# Patient Record
Sex: FEMALE | Race: WHITE | Employment: UNEMPLOYED | URBAN - METROPOLITAN AREA
[De-identification: names, ages, dates, MRNs, and addresses within clinical notes are randomized per-mention and may not be internally consistent; named-entity substitution may affect disease eponyms.]

---

## 2017-01-22 ENCOUNTER — TELEPHONE (OUTPATIENT)
Dept: FAMILY MEDICINE CLINIC | Age: 57
End: 2017-01-22

## 2017-01-23 RX ORDER — OXYCODONE AND ACETAMINOPHEN 5; 325 MG/1; MG/1
1 TABLET ORAL
Qty: 45 TAB | Refills: 0 | Status: SHIPPED | OUTPATIENT
Start: 2017-01-27 | End: 2017-02-18 | Stop reason: SDUPTHER

## 2017-02-20 RX ORDER — IBUPROFEN 800 MG/1
800 TABLET ORAL
Qty: 30 TAB | Refills: 2 | Status: SHIPPED | OUTPATIENT
Start: 2017-02-20 | End: 2017-06-29 | Stop reason: SDUPTHER

## 2017-02-20 RX ORDER — AMOXICILLIN AND CLAVULANATE POTASSIUM 875; 125 MG/1; MG/1
1 TABLET, FILM COATED ORAL EVERY 12 HOURS
Qty: 20 TAB | Refills: 0 | Status: SHIPPED | OUTPATIENT
Start: 2017-02-20 | End: 2017-03-31 | Stop reason: SDUPTHER

## 2017-02-20 RX ORDER — FLUCONAZOLE 150 MG/1
150 TABLET ORAL DAILY
Qty: 2 TAB | Refills: 0 | Status: SHIPPED | OUTPATIENT
Start: 2017-02-20 | End: 2017-04-03 | Stop reason: SDUPTHER

## 2017-02-20 NOTE — TELEPHONE ENCOUNTER
From: Jasiel Hernandez  To: Norma Ross MD  Sent: 2/18/2017 7:21 PM EST  Subject: Medication Renewal Request    Original authorizing provider: MD Jasiel Tai would like a refill of the following medications:  ibuprofen (MOTRIN) 800 mg tablet Federico Lyons MD]    Preferred pharmacy: [facility] [address] [place] [address],[address]:

## 2017-03-29 ENCOUNTER — OFFICE VISIT (OUTPATIENT)
Dept: FAMILY MEDICINE CLINIC | Age: 57
End: 2017-03-29

## 2017-03-29 VITALS
HEIGHT: 56 IN | HEART RATE: 97 BPM | DIASTOLIC BLOOD PRESSURE: 90 MMHG | WEIGHT: 146 LBS | BODY MASS INDEX: 32.84 KG/M2 | OXYGEN SATURATION: 98 % | RESPIRATION RATE: 16 BRPM | SYSTOLIC BLOOD PRESSURE: 146 MMHG | TEMPERATURE: 97.6 F

## 2017-03-29 DIAGNOSIS — E11.40 TYPE 2 DIABETES MELLITUS WITH DIABETIC NEUROPATHY, UNSPECIFIED LONG TERM INSULIN USE STATUS: Primary | ICD-10-CM

## 2017-03-29 DIAGNOSIS — G89.29 OTHER CHRONIC PAIN: ICD-10-CM

## 2017-03-29 DIAGNOSIS — S91.002S WOUND OF LEFT ANKLE, SEQUELA: ICD-10-CM

## 2017-03-29 LAB
BARBITURATES UR POC: NEGATIVE
BENZODIAZEPINES UR POC: NEGATIVE
COCAINE QL URINE POC: NEGATIVE
LOT EXP DATE POC: NORMAL
LOT NUMBER POC: NORMAL
MARIJUANA (THC) QL URINE POC: NEGATIVE
MDMA/ECSTASY UR POC: NEGATIVE
METHADONE QL URINE POC: NEGATIVE
METHAMPHETAMINE QL URINE POC: NEGATIVE
NTI OTHER MICRO TRANSPORT 977598: NEGATIVE
OPIATES QL URINE POC: NEGATIVE
OXYCODONE UR POC: NORMAL
VALID INTERNAL CONTROL?: YES

## 2017-03-29 RX ORDER — OXYCODONE AND ACETAMINOPHEN 5; 325 MG/1; MG/1
1 TABLET ORAL
Qty: 45 TAB | Refills: 0 | Status: SHIPPED | OUTPATIENT
Start: 2017-03-29 | End: 2017-04-26 | Stop reason: SDUPTHER

## 2017-03-29 NOTE — PROGRESS NOTES
Chief Complaint   Patient presents with    Follow-up     3 month follow-up    Foot Pain     Left foot pain     Subjective: (As above and below)     Chief Complaint   Patient presents with    Follow-up     3 month follow-up    Foot Pain     Left foot pain     she is a 60y.o. year old female who presents for evaluation. Reviewed PmHx, RxHx, FmHx, SocHx, AllgHx and updated in chart. Review of Systems - negative except as listed above    Objective:     Vitals:    03/29/17 1438   BP: 146/90   Pulse: 97   Resp: 16   Temp: 97.6 °F (36.4 °C)   TempSrc: Oral   SpO2: 98%   Weight: 146 lb (66.2 kg)   Height: 4' 8\" (1.422 m)     Physical Examination: General appearance - alert, well appearing, and in no distress  Mental status - normal mood, behavior, speech, dress, motor activity, and thought processes  Eyes - pupils equal and reactive, extraocular eye movements intact  Mouth - mucous membranes moist, pharynx normal without lesions  Chest - clear to auscultation, no wheezes, rales or rhonchi, symmetric air entry  Heart - normal rate, regular rhythm, normal S1, S2, no murmurs, rubs, clicks or gallops  Musculoskeletal - chronic left ankle wound, wrapped today  Extremities - no edema    Assessment/ Plan:   1. Type 2 diabetes mellitus with diabetic neuropathy, unspecified long term insulin use status ([facility] [address]  -continue on current medication    2. Other chronic pain  -well controlled on current medication    3. Wound of left ankle, sequela  -advised pt to keep specialist follow up     Follow-up Disposition: As needed  I have discussed the diagnosis with the patient and the intended plan as seen in the above orders. The patient has received an after-visit summary and questions were answered concerning future plans.      Medication Side Effects and Warnings were discussed with patient: yes  Patient Labs were reviewed: yes  Patient Past Records were reviewed:  yes    Nancy Rodney M.D.

## 2017-03-29 NOTE — PROGRESS NOTES
Chief Complaint   Patient presents with    Follow-up     3 month follow-up    Foot Pain     Left foot pain

## 2017-03-29 NOTE — LETTER
Ludin Grady GPU:7/23/2087 MR #:[id number] Provider Marlyn Rutherford MD  
*XYFD-395* BSMG-491 (5/16) Page 1 of 5 Initial [employer] CONTROLLED SUBSTANCE AGREEMENT I may be prescribed medications that are controlled substances as part  of my treatment plan for management of my medical condition(s). The goal of my treatment plan is to maintain and/or improve my health and wellbeing. Because controlled substances have an increased risk of abuse or harm, continual re-evaluation is needed determine if the goals of my treatment plan are being met for my safety and the safety of others. Heather Freeman  am entering into this Controlled Substance Agreement with my provider, Jagdish Zafar MD at [address] . I understand that successful treatment requires mutual trust and honesty between me and my provider. I understand that there are state and federal laws and regulations which apply to the medications that my provider may prescribe that must be followed. I understand there are risks and benefits ts of taking the medicines that my provider may prescribe. I understand and agree that following this Agreement is necessary in continuing my provider-patient relationship and success of my treatment plan. As a part of my treatment plan, I agree to the following: COMMUNICATION: 
 
1. I will communicate fully with my provider about my medical condition(s), including the effect on my daily life and how well my medications are helping. I will tell my provider all of the medications that I take for any reason, including medications I receive from another health care provider, and will notify my provider about all issues, problems or concerns, including any side effects, which may be related to my medications. I understand that this information allows my provider to adjust my treatment plan to help manage my medical condition.  I understand that this information will become part of my permanent medical record. 2. I will notify my provider if I have a history of alcohol/drug misuse/addiction or if I have had treatment for alcohol/drug addiction in the past, or if I have a new problem with or concern about alcohol/drug use/addiction, because this increases the likelihood of high risk behaviors and may lead to serious medical conditions. 3. Females Only: I will notify my provider if I am or become pregnant, or if I intend to become pregnant, or if I intend to breastfeed. I understand that communication of these issues with my provider is important, due to possible effects my medication could have on an unborn fetus or breastfeeding child. Dhaval Navarro IPF:[birth year] MR #:[id number] Provider Charlie Villagran MD  
*IJZJ-377* BSMG-491 (5/16) Page 2 of 5 Initial [employer] MISUSE OF MEDICATIONS / DRUGS: 
 
1. I agree to take all controlled substances as prescribed, and will not misuse or abuse any controlled substances prescribed by my provider. For my safety, I will not increase the amount of medicine I take without first talking with and getting permission from my provider. 2. If I have a medical emergency, another health care provider may prescribe me medication. If I seek emergency treatment, I will notify my provider within seventy-two (72) hours. 3. I understand that my provider may discuss my use and/or possible misuse/abuse of controlled substances and alcohol, as appropriate, with any health care provider involved in my care, pharmacist or legal authority. ILLEGAL DRUGS: 
 
1. I will not use illegal drugs of any kind, including but not limited to marijuana, heroin, cocaine, or any prescription drug which is not prescribed to me. DRUG DIVERSION / PRESCRIPTION FRAUD: 
 
1. I will not share, sell, trade, give away, or otherwise misuse my prescriptions or medications. 2. I will not alter any prescriptions provided to me by my provider. SINGLE PROVIDER: 
 
1. I agree that all controlled substances that I take will be prescribed only by my provider (or his/her covering provider) under this Agreement. This agreement does not prevent me from seeking emergency medical treatment or receiving pain management related to a surgery. PROTECTING MEDICATIONS: 
 
1. I am responsible for keeping my prescriptions and medications in a safe and secure place including safeguarding them from loss or theft. I understand that lost, stolen or damaged/destroyed prescriptions or medications will not be replaced. Dhaval HAGRE:0/39/9108 MR #:[id number] Provider Charlie Villagran MD  
*GQJE-525* BSMG-491 (5/16) Page 3 of 5 Initial "2,10E+07" PRESCRIPTION RENEWALS/REFILLS: 
 
1. I will follow my controlled substance medication schedule as prescribed by my provider. 2. I understand and agree that I will make any requests for renewals or refills of my prescriptions only at the time of an office visit or during my providers regular office hours subject to the prescription refill requirements of the individual practice. 3. I understand that my provider may not call in prescriptions for controlled substances to my pharmacy. 4. I understand that my provider may adjust or discontinue these medications as deemed appropriate for my medical treatment plan. This Agreement does not guarantee the prescription of controlled medications. 5. I agree that if my medications are adjusted or discontinued, I will properly dispose of any remaining medications. I understand that I will be required to dispose of any remaining controlled medications prior to being provided with any prescriptions for other controlled medications.  
 
 
1. I authorize my provider and my pharmacy to cooperate fully with any local, state, or federal law enforcement agency in the investigation of any possible misuse, sale, or other diversion of my controlled substance prescriptions or medications. RISKS: 
 
 
1. I understand that if I do not adhere to this Agreement in any way, my provider may change my prescriptions, stop prescribing controlled substances or end our provider-patient relationship. 2. If my provider decides to stop prescribing medication, or decides to end our provider-patient relationship,my provider may require that I taper my medications slowly. If necessary, my provider may also provide a prescription for other medications to treat my withdrawal symptoms. UNDERSTANDING THIS AGREEMENT: 
 
I understand that my provider may adjust or stop my prescriptions for controlled substances based on my medical condition and my treatment plan. I understand that this Agreement does not guarantee that I will be prescribed medications or controlled substances. I understand that controlled substances may be just one part 
of my treatment plan. My initial on each page and my signature below shows that I have read each page of this Agreement, I have had an opportunity to ask questions, and all of my questions have been answered to my satisfaction by my provider. By signing below, I agree to comply with this Agreement, and I understand that if I do not follow the Agreements listed above, my provider may stop 
 
 
 
_________________________________________  Date/Time 3/29/2017 3:18 PM   
             (Patient Signature)

## 2017-03-29 NOTE — MR AVS SNAPSHOT
Visit Information Date & Time Provider Department Dept. Phone Encounter #  
 3/29/2017  2:20 PM Matteo Hays MD [address] [facility] [phone] [id number] Upcoming Health Maintenance Date Due  
 EYE EXAM RETINAL OR DILATED Q1 9/24/1968 COLONOSCOPY 9/24/1976 BREAST CANCER SCRN MAMMOGRAM 9/24/2008 MICROALBUMIN Q1 2/8/2017 FOOT EXAM Q1 3/4/2017 PAP AKA CERVICAL CYTOLOGY 6/19/2017 HEMOGLOBIN A1C Q6M 6/28/2017 LIPID PANEL Q1 12/28/2017 DTaP/Tdap/Td series (2 - Td) 8/29/2025 Allergies as of 3/29/2017  Review Complete On: 3/29/2017 By: Matteo Hays MD  
  
 Severity Noted Reaction Type Reactions Potassium Medium 03/03/2016    Other (comments) Patient states she will refuse IV Potassium due to irritation-Patient will accept PO form. Anaprox [Naproxen Sodium]  10/05/2011    Other (comments) Blood pressure drops Tolerates ibuprofen Codeine  10/05/2011    Nausea and Vomiting Tolerates oxycodone/acetaminophen Mobic [Meloxicam]  10/05/2011    Other (comments) Blood pressure drops Tolerates ibuprofen Tylenol [Acetaminophen]  08/29/2015    Other (comments) \"Stomach hurts\", hx of Crohn's disease Current Immunizations  Reviewed on 10/25/2016 Name Date Influenza Vaccine 1/14/2014 Influenza Vaccine (Quad) PF 10/25/2016, 10/14/2015, 12/17/2014 Influenza Vaccine Split 10/4/2012, 10/5/2011 Pneumococcal Polysaccharide (PPSV-23) 1/14/2014 Pneumococcal Vaccine (Unspecified Type) 10/5/2011 Tdap 8/29/2015  8:35 PM  
  
 Not reviewed this visit You Were Diagnosed With   
  
 Codes Comments Type 2 diabetes mellitus with diabetic neuropathy, unspecified long term insulin use status ([facility]    -  Primary ICD-10-CM: E11.40 ICD-9-CM: 250.60, 357.2 Other chronic pain     ICD-10-CM: G89.29 ICD-9-CM: 338.29 Wound of left ankle, sequela     ICD-10-CM: S91.002S ICD-9-CM: 906.1 Vitals BP Pulse Temp Resp Height(growth percentile) Weight(growth percentile) 146/90 (BP 1 Location: Left arm, BP Patient Position: Sitting) 97 97.6 °F (36.4 °C) (Oral) 16 4' 8\" (1.422 m) 146 lb (66.2 kg) SpO2 BMI OB Status Smoking Status 98% 32.73 kg/m2 Hysterectomy Current Every Day Smoker Vitals History BMI and BSA Data Body Mass Index Body Surface Area 32.73 kg/m 2 1.62 m 2 Preferred Pharmacy Pharmacy Name Phone [facility] [address] Your Updated Medication List  
  
   
This list is accurate as of: 3/29/17  3:22 PM.  Always use your most recent med list.  
  
  
  
  
 aspirin delayed-release 81 mg tablet Commonly known as:  VOIYL-77 Take 1 Tab by mouth daily. fish oil-omega-3 fatty acids 340-1,000 mg capsule Take 1 Cap by mouth daily. fluconazole 150 mg tablet Commonly known as:  DIFLUCAN Take 1 Tab by mouth daily. Repeat in 3 days if needed. furosemide 40 mg tablet Commonly known as:  LASIX Take 1 Tab by mouth as needed (Swelling). gabapentin 300 mg capsule Commonly known as:  NEURONTIN Take 300 mg by mouth three (3) times daily. ibuprofen 800 mg tablet Commonly known as:  MOTRIN Take 1 Tab by mouth every eight (8) hours as needed for Pain.  
  
 lidocaine-prilocaine topical cream  
Commonly known as:  EMLA Apply  to affected area as needed for Pain. lisinopril 20 mg tablet Commonly known as:  Ora Bee Take 20 mg by mouth nightly. metFORMIN 1,000 mg tablet Commonly known as:  GLUCOPHAGE  
TAKE ONE TABLET BY MOUTH TWICE DAILY WITH MEALS  
  
 methocarbamol 750 mg tablet Commonly known as:  ROBAXIN Take 1 Tab by mouth three (3) times daily. nystatin topical cream  
Commonly known as:  MYCOSTATIN Apply  to affected area two (2) times a day. oxybutynin 5 mg tablet Commonly known as:  QMMEREMX  
 TAKE ONE TABLET BY MOUTH FOUR TIMES DAILY  
  
 oxyCODONE-acetaminophen 5-325 mg per tablet Commonly known as:  PERCOCET Take 1 Tab by mouth every four (4) hours as needed for Pain. Max Daily Amount: 6 Tabs. pravastatin 40 mg tablet Commonly known as:  PRAVACHOL  
TAKE ONE TABLET BY MOUTH NIGHTLY  
  
 rOPINIRole 0.5 mg tablet Commonly known as:  Bedoya Ramus Take 1 Tab by mouth nightly. VITAMIN D3 1,000 unit tablet Generic drug:  cholecalciferol Take 1,000 Units by mouth daily. Prescriptions Printed Refills  
 oxyCODONE-acetaminophen (PERCOCET) 5-325 mg per tablet 0 Sig: Take 1 Tab by mouth every four (4) hours as needed for Pain. Max Daily Amount: 6 Tabs. Class: Print Route: Oral  
  
Introducing [facility]! Dear Jing Reyes: 
Thank you for requesting a [employer] account. Our records indicate that you already have an active [employer] account. You can access your account anytime at [email or web address] Did you know that you can access your hospital and ER discharge instructions at any time in [employer]? You can also review all of your test results from your hospital stay or ER visit. Additional Information If you have questions, please visit the Frequently Asked Questions section of the [employer] website at [email or web address] Remember, [employer] is NOT to be used for urgent needs. For medical emergencies, dial 911. Now available from your iPhone and Android! Please provide this summary of care documentation to your next provider. Your primary care clinician is listed as Janee Lyons. If you have any questions after today's visit, please call [phone].

## 2017-03-31 RX ORDER — AMOXICILLIN AND CLAVULANATE POTASSIUM 875; 125 MG/1; MG/1
TABLET, FILM COATED ORAL
Qty: 20 TAB | Refills: 0 | Status: SHIPPED | OUTPATIENT
Start: 2017-03-31 | End: 2017-07-05 | Stop reason: ALTCHOICE

## 2017-04-03 RX ORDER — FLUCONAZOLE 150 MG/1
TABLET ORAL
Qty: 3 TAB | Refills: 0 | Status: SHIPPED | OUTPATIENT
Start: 2017-04-03 | End: 2017-07-05 | Stop reason: ALTCHOICE

## 2017-06-12 DIAGNOSIS — N39.41 URGE INCONTINENCE: ICD-10-CM

## 2017-06-12 RX ORDER — OXYBUTYNIN CHLORIDE 5 MG/1
TABLET ORAL
Qty: 120 TAB | Refills: 0 | Status: SHIPPED | OUTPATIENT
Start: 2017-06-12 | End: 2017-06-29 | Stop reason: SDUPTHER

## 2017-06-29 DIAGNOSIS — N39.41 URGE INCONTINENCE: ICD-10-CM

## 2017-06-29 RX ORDER — OXYBUTYNIN CHLORIDE 5 MG/1
5 TABLET ORAL 4 TIMES DAILY
Qty: 120 TAB | Refills: 0 | Status: SHIPPED | OUTPATIENT
Start: 2017-06-29 | End: 2017-09-27 | Stop reason: SDUPTHER

## 2017-06-29 RX ORDER — ROPINIROLE 0.5 MG/1
0.5 TABLET, FILM COATED ORAL
Qty: 30 TAB | Refills: 5 | Status: SHIPPED | OUTPATIENT
Start: 2017-06-29 | End: 2017-08-20 | Stop reason: SDUPTHER

## 2017-06-29 RX ORDER — IBUPROFEN 800 MG/1
800 TABLET ORAL
Qty: 30 TAB | Refills: 2 | Status: SHIPPED | OUTPATIENT
Start: 2017-06-29 | End: 2017-07-27 | Stop reason: SDUPTHER

## 2017-06-29 NOTE — TELEPHONE ENCOUNTER
From: Evy Page  To: Nima Vivar MD  Sent: 6/29/2017 12:09 PM EDT  Subject: Medication Renewal Request    Original authorizing provider: MD Fabian Antonio.  Jarrell Briones would like a refill of the following medications:  rOPINIRole (REQUIP) 0.5 mg tablet [Janee Lyons MD]  ibuprofen (MOTRIN) 800 mg tablet Ty Alan Lyons MD]  oxybutynin (DITROPAN) 5 mg tablet Michelle Lyons MD]    Preferred pharmacy: [facility] [address]:

## 2017-07-05 ENCOUNTER — OFFICE VISIT (OUTPATIENT)
Dept: FAMILY MEDICINE CLINIC | Age: 57
End: 2017-07-05

## 2017-07-05 VITALS
HEIGHT: 56 IN | RESPIRATION RATE: 19 BRPM | BODY MASS INDEX: 30.14 KG/M2 | OXYGEN SATURATION: 98 % | SYSTOLIC BLOOD PRESSURE: 143 MMHG | TEMPERATURE: 99 F | HEART RATE: 89 BPM | DIASTOLIC BLOOD PRESSURE: 86 MMHG | WEIGHT: 134 LBS

## 2017-07-05 DIAGNOSIS — S91.302S OPEN WOUND OF LEFT FOOT, SEQUELA: ICD-10-CM

## 2017-07-05 DIAGNOSIS — E11.40 TYPE 2 DIABETES MELLITUS WITH DIABETIC NEUROPATHY, UNSPECIFIED LONG TERM INSULIN USE STATUS: Primary | ICD-10-CM

## 2017-07-05 DIAGNOSIS — I73.9 PAD (PERIPHERAL ARTERY DISEASE) (HCC): ICD-10-CM

## 2017-07-05 DIAGNOSIS — I10 ESSENTIAL HYPERTENSION WITH GOAL BLOOD PRESSURE LESS THAN 140/90: ICD-10-CM

## 2017-07-05 RX ORDER — OXYCODONE AND ACETAMINOPHEN 5; 325 MG/1; MG/1
1 TABLET ORAL
Qty: 45 TAB | Refills: 0 | Status: SHIPPED | OUTPATIENT
Start: 2017-07-05 | End: 2017-07-27 | Stop reason: SDUPTHER

## 2017-07-05 NOTE — PROGRESS NOTES
Chief Complaint   Patient presents with    Medication Refill    Wound Check     left foot     Patient seen in the office today for pain 10/10  to left foot and medication refill.

## 2017-07-05 NOTE — PROGRESS NOTES
Chief Complaint   Patient presents with    Medication Refill    Wound Check     left foot     Patient seen in the office today for pain 10/10  to left foot and medication refill. Subjective: (As above and below)     Chief Complaint   Patient presents with    Medication Refill    Wound Check     left foot     she is a 60y.o. year old female who presents for evaluation. Reviewed PmHx, RxHx, FmHx, SocHx, AllgHx and updated in chart. Review of Systems - negative except as listed above    Objective:     Vitals:    07/05/17 1542   BP: 143/86   Pulse: 89   Resp: 19   Temp: 99 °F (37.2 °C)   TempSrc: Oral   SpO2: 98%   Weight: 134 lb (60.8 kg)   Height: 4' 8\" (1.422 m)     Physical Examination: General appearance - alert, well appearing, and in no distress  Mental status - normal mood, behavior, speech, dress, motor activity, and thought processes  Eyes - pupils equal and reactive, extraocular eye movements intact  Mouth - mucous membranes moist, pharynx normal without lesions  Chest - clear to auscultation, no wheezes, rales or rhonchi, symmetric air entry  Heart - normal rate, regular rhythm, normal S1, S2, no murmurs, rubs, clicks or gallops  Musculoskeletal - left foot pain  Skin - open wound on left lateral foot, erythema of entire left foot and ankle, very tender to touch diffusely    Assessment/ Plan:   1. Type 2 diabetes mellitus with diabetic neuropathy, unspecified long term insulin use status  -check labs  - CBC WITH AUTOMATED DIFF  - LIPID PANEL  - METABOLIC PANEL, COMPREHENSIVE  - TSH 3RD GENERATION  - VITAMIN D, 25 HYDROXY  - HEMOGLOBIN A1C WITH EAG    2. PAD (peripheral artery disease) (HCC)  -poor wound healing    3. Essential hypertension with goal blood pressure less than 140/90  -elevated today, likely due to pain  -will check at home    4.  Open wound of left foot, sequela  -will try to reinstate home health, need for wound care     Follow-up Disposition: Ad needed  I have discussed the diagnosis with the patient and the intended plan as seen in the above orders. The patient has received an after-visit summary and questions were answered concerning future plans.      Medication Side Effects and Warnings were discussed with patient: yes  Patient Labs were reviewed: yes  Patient Past Records were reviewed:  yes    Brooklyn Randle M.D.

## 2017-07-05 NOTE — MR AVS SNAPSHOT
Visit Information Date & Time Provider Department Dept. Phone Encounter #  
 7/5/2017  3:45 PM Ken Frazier MD [address] [facility] [phone] [id number] Upcoming Health Maintenance Date Due  
 EYE EXAM RETINAL OR DILATED Q1 9/24/1968 COLONOSCOPY 9/24/1976 BREAST CANCER SCRN MAMMOGRAM 9/24/2008 MICROALBUMIN Q1 2/8/2017 FOOT EXAM Q1 3/4/2017 PAP AKA CERVICAL CYTOLOGY 6/19/2017 HEMOGLOBIN A1C Q6M 6/28/2017 INFLUENZA AGE 9 TO ADULT 8/1/2017 LIPID PANEL Q1 12/28/2017 DTaP/Tdap/Td series (2 - Td) 8/29/2025 Allergies as of 7/5/2017  Review Complete On: 7/5/2017 By: Ken Frazier MD  
  
 Severity Noted Reaction Type Reactions Potassium Medium 03/03/2016    Other (comments) Patient states she will refuse IV Potassium due to irritation-Patient will accept PO form. Anaprox [Naproxen Sodium]  10/05/2011    Other (comments) Blood pressure drops Tolerates ibuprofen Codeine  10/05/2011    Nausea and Vomiting Tolerates oxycodone/acetaminophen Mobic [Meloxicam]  10/05/2011    Other (comments) Blood pressure drops Tolerates ibuprofen Tylenol [Acetaminophen]  08/29/2015    Other (comments) \"Stomach hurts\", hx of Crohn's disease Current Immunizations  Reviewed on 10/25/2016 Name Date Influenza Vaccine 1/14/2014 Influenza Vaccine (Quad) PF 10/25/2016, 10/14/2015, 12/17/2014 Influenza Vaccine Split 10/4/2012, 10/5/2011 Pneumococcal Polysaccharide (PPSV-23) 1/14/2014 Pneumococcal Vaccine (Unspecified Type) 10/5/2011 Tdap 8/29/2015  8:35 PM  
  
 Not reviewed this visit You Were Diagnosed With   
  
 Codes Comments Type 2 diabetes mellitus with diabetic neuropathy, unspecified long term insulin use status    -  Primary ICD-10-CM: E11.40 ICD-9-CM: 250.60, 357.2 PAD (peripheral artery disease) (HCC)     ICD-10-CM: I73.9 ICD-9-CM: 443.9 Essential hypertension with goal blood pressure less than 140/90     ICD-10-CM: I10 
ICD-9-CM: 401.9 Open wound of left foot, sequela     ICD-10-CM: S91.302S ICD-9-CM: 906.1 Vitals BP Pulse Temp Resp Height(growth percentile) Weight(growth percentile) 143/86 (BP 1 Location: Left arm, BP Patient Position: Sitting) 89 99 °F (37.2 °C) (Oral) 19 4' 8\" (1.422 m) 134 lb (60.8 kg) SpO2 BMI OB Status Smoking Status 98% 30.04 kg/m2 Hysterectomy Current Every Day Smoker Vitals History BMI and BSA Data Body Mass Index Body Surface Area 30.04 kg/m 2 1.55 m 2 Preferred Pharmacy Pharmacy Name Phone [facility] [address] Your Updated Medication List  
  
   
This list is accurate as of: 7/5/17  4:27 PM.  Always use your most recent med list.  
  
  
  
  
 aspirin delayed-release 81 mg tablet Commonly known as:  IRIMT-23 Take 1 Tab by mouth daily. fish oil-omega-3 fatty acids 340-1,000 mg capsule Take 1 Cap by mouth daily. furosemide 40 mg tablet Commonly known as:  LASIX Take 1 Tab by mouth as needed (Swelling). gabapentin 300 mg capsule Commonly known as:  NEURONTIN Take 300 mg by mouth three (3) times daily. ibuprofen 800 mg tablet Commonly known as:  MOTRIN Take 1 Tab by mouth every eight (8) hours as needed for Pain.  
  
 lidocaine-prilocaine topical cream  
Commonly known as:  EMLA Apply  to affected area as needed for Pain. lisinopril 20 mg tablet Commonly known as:  Heri Brian Take 20 mg by mouth nightly. metFORMIN 1,000 mg tablet Commonly known as:  GLUCOPHAGE  
TAKE ONE TABLET BY MOUTH TWICE DAILY WITH MEALS  
  
 methocarbamol 750 mg tablet Commonly known as:  ROBAXIN Take 1 Tab by mouth three (3) times daily. nystatin topical cream  
Commonly known as:  MYCOSTATIN Apply  to affected area two (2) times a day. oxybutynin 5 mg tablet Commonly known as:  IWLDPNFF Take 1 Tab by mouth four (4) times daily. oxyCODONE-acetaminophen 5-325 mg per tablet Commonly known as:  PERCOCET Take 1 Tab by mouth every four (4) hours as needed for Pain. Max Daily Amount: 6 Tabs. pravastatin 40 mg tablet Commonly known as:  PRAVACHOL  
TAKE ONE TABLET BY MOUTH NIGHTLY  
  
 rOPINIRole 0.5 mg tablet Commonly known as:  Primitivo Moritz Take 1 Tab by mouth nightly. VITAMIN D3 1,000 unit tablet Generic drug:  cholecalciferol Take 1,000 Units by mouth daily. Prescriptions Printed Refills  
 oxyCODONE-acetaminophen (PERCOCET) 5-325 mg per tablet 0 Sig: Take 1 Tab by mouth every four (4) hours as needed for Pain. Max Daily Amount: 6 Tabs. Class: Print Route: Oral  
  
We Performed the Following CBC WITH AUTOMATED DIFF [39448 CPT(R)] HEMOGLOBIN A1C WITH EAG [79833 CPT(R)] LIPID PANEL [20632 CPT(R)] METABOLIC PANEL, COMPREHENSIVE [89171 CPT(R)] TSH 3RD GENERATION [90066 CPT(R)] VITAMIN D, 25 HYDROXY [id number] CPT(R)] Introducing [facility]! Dear Costa: 
Thank you for requesting a [employer] account. Our records indicate that you already have an active [employer] account. You can access your account anytime at [email or web address] Did you know that you can access your hospital and ER discharge instructions at any time in [employer]? You can also review all of your test results from your hospital stay or ER visit. Additional Information If you have questions, please visit the Frequently Asked Questions section of the [employer] website at [email or web address] Remember, [employer] is NOT to be used for urgent needs. For medical emergencies, dial 911. Now available from your iPhone and Android! Please provide this summary of care documentation to your next provider. Your primary care clinician is listed as Janee Lyons. If you have any questions after today's visit, please call [phone].

## 2017-07-06 LAB
25(OH)D3+25(OH)D2 SERPL-MCNC: 6.6 NG/ML (ref 30–100)
ALBUMIN SERPL-MCNC: 4.2 G/DL (ref 3.5–5.5)
ALBUMIN/GLOB SERPL: 1.5 {RATIO} (ref 1.2–2.2)
ALP SERPL-CCNC: 99 IU/L (ref 39–117)
ALT SERPL-CCNC: 28 IU/L (ref 0–32)
AST SERPL-CCNC: 29 IU/L (ref 0–40)
BASOPHILS # BLD AUTO: 0.1 X10E3/UL (ref 0–0.2)
BASOPHILS NFR BLD AUTO: 0 %
BILIRUB SERPL-MCNC: 0.2 MG/DL (ref 0–1.2)
BUN SERPL-MCNC: 10 MG/DL (ref 6–24)
BUN/CREAT SERPL: 23 (ref 9–23)
CALCIUM SERPL-MCNC: 9.1 MG/DL (ref 8.7–10.2)
CHLORIDE SERPL-SCNC: 100 MMOL/L (ref 96–106)
CHOLEST SERPL-MCNC: 208 MG/DL (ref 100–199)
CO2 SERPL-SCNC: 20 MMOL/L (ref 18–29)
CREAT SERPL-MCNC: 0.44 MG/DL (ref 0.57–1)
EOSINOPHIL # BLD AUTO: 0.2 X10E3/UL (ref 0–0.4)
EOSINOPHIL NFR BLD AUTO: 2 %
ERYTHROCYTE [DISTWIDTH] IN BLOOD BY AUTOMATED COUNT: 13.8 % (ref 12.3–15.4)
EST. AVERAGE GLUCOSE BLD GHB EST-MCNC: 249 MG/DL
GLOBULIN SER CALC-MCNC: 2.8 G/DL (ref 1.5–4.5)
GLUCOSE SERPL-MCNC: 119 MG/DL (ref 65–99)
HBA1C MFR BLD: 10.3 % (ref 4.8–5.6)
HCT VFR BLD AUTO: 39.5 % (ref 34–46.6)
HDLC SERPL-MCNC: 29 MG/DL
HGB BLD-MCNC: 13.4 G/DL (ref 11.1–15.9)
IMM GRANULOCYTES # BLD: 0 X10E3/UL (ref 0–0.1)
IMM GRANULOCYTES NFR BLD: 0 %
INTERPRETATION, 910389: NORMAL
LDLC SERPL CALC-MCNC: 110 MG/DL (ref 0–99)
LYMPHOCYTES # BLD AUTO: 3.2 X10E3/UL (ref 0.7–3.1)
LYMPHOCYTES NFR BLD AUTO: 29 %
Lab: NORMAL
MCH RBC QN AUTO: 28.6 PG (ref 26.6–33)
MCHC RBC AUTO-ENTMCNC: 33.9 G/DL (ref 31.5–35.7)
MCV RBC AUTO: 84 FL (ref 79–97)
MONOCYTES # BLD AUTO: 0.4 X10E3/UL (ref 0.1–0.9)
MONOCYTES NFR BLD AUTO: 4 %
NEUTROPHILS # BLD AUTO: 7.3 X10E3/UL (ref 1.4–7)
NEUTROPHILS NFR BLD AUTO: 65 %
PLATELET # BLD AUTO: 298 X10E3/UL (ref 150–379)
POTASSIUM SERPL-SCNC: 4.1 MMOL/L (ref 3.5–5.2)
PROT SERPL-MCNC: 7 G/DL (ref 6–8.5)
RBC # BLD AUTO: 4.69 X10E6/UL (ref 3.77–5.28)
SODIUM SERPL-SCNC: 139 MMOL/L (ref 134–144)
TRIGL SERPL-MCNC: 344 MG/DL (ref 0–149)
TSH SERPL DL<=0.005 MIU/L-ACNC: 0.47 UIU/ML (ref 0.45–4.5)
VLDLC SERPL CALC-MCNC: 69 MG/DL (ref 5–40)
WBC # BLD AUTO: 11.3 X10E3/UL (ref 3.4–10.8)

## 2017-07-06 NOTE — PROGRESS NOTES
Slight elevation in WBC count, recommend course of antibiotics, please advise if you agree. Cholesterol is elevated, please closely monitor diet and increase exercise, recheck in 6 months. Vitamin D is slightly low, please take a daily supplement of at least 2000 IU (international units) daily. Diabetic control improved but not at goal. Recommend adding additional medication, please advise if you agree. A message has been sent in [employer] and the lab work released to the patient.

## 2017-07-07 RX ORDER — CLINDAMYCIN HYDROCHLORIDE 300 MG/1
300 CAPSULE ORAL 3 TIMES DAILY
Qty: 30 CAP | Refills: 0 | Status: SHIPPED | OUTPATIENT
Start: 2017-07-07 | End: 2017-07-27 | Stop reason: SDUPTHER

## 2017-07-07 RX ORDER — PIOGLITAZONEHYDROCHLORIDE 30 MG/1
30 TABLET ORAL DAILY
Qty: 30 TAB | Refills: 5 | Status: SHIPPED | OUTPATIENT
Start: 2017-07-07 | End: 2017-11-06 | Stop reason: SDUPTHER

## 2017-07-27 ENCOUNTER — TELEPHONE (OUTPATIENT)
Dept: FAMILY MEDICINE CLINIC | Age: 57
End: 2017-07-27

## 2017-07-29 RX ORDER — OXYCODONE AND ACETAMINOPHEN 5; 325 MG/1; MG/1
1 TABLET ORAL
Qty: 45 TAB | Refills: 0 | Status: SHIPPED | OUTPATIENT
Start: 2017-08-03 | End: 2017-08-29 | Stop reason: SDUPTHER

## 2017-07-29 RX ORDER — IBUPROFEN 800 MG/1
800 TABLET ORAL
Qty: 30 TAB | Refills: 2 | Status: SHIPPED | OUTPATIENT
Start: 2017-07-29 | End: 2017-11-06 | Stop reason: SDUPTHER

## 2017-07-29 RX ORDER — CLINDAMYCIN HYDROCHLORIDE 300 MG/1
300 CAPSULE ORAL 3 TIMES DAILY
Qty: 30 CAP | Refills: 0 | Status: SHIPPED | OUTPATIENT
Start: 2017-07-29 | End: 2017-10-03 | Stop reason: SDUPTHER

## 2017-07-29 NOTE — TELEPHONE ENCOUNTER
From: Anna Ferrer  To: Thiago Zamora MD  Sent: 7/27/2017 8:30 PM EDT  Subject: Medication Renewal Request    Original authorizing provider: MD Evelia Alexander.  Juan Ramon Cross would like a refill of the following medications:  ibuprofen (MOTRIN) 800 mg tablet Natividad Lyons MD]  oxyCODONE-acetaminophen (PERCOCET) 5-325 mg per tablet Natividad Lyons MD]  clindamycin (CLEOCIN) 300 mg capsule Angel Lyons MD]    Preferred pharmacy: [facility] [address] [place] [address] [address]:

## 2017-07-31 NOTE — TELEPHONE ENCOUNTER
Call placed to patient informing her Rx has been placed at the pharmacy and is available for pick. Patient verbalized understanding. Patient also inuring about spouse rx. 2 patient id and hippa verified. Writer informed patient his Rx is ready as well. No further questions or comments voiced.

## 2017-08-21 RX ORDER — ROPINIROLE 0.5 MG/1
0.5 TABLET, FILM COATED ORAL
Qty: 30 TAB | Refills: 5 | Status: SHIPPED | OUTPATIENT
Start: 2017-08-21 | End: 2017-11-06 | Stop reason: SDUPTHER

## 2017-08-21 NOTE — TELEPHONE ENCOUNTER
From: Ryan Astudillo  To: Servando Michael MD  Sent: 8/20/2017 7:27 AM EDT  Subject: Medication Renewal Request    Original authorizing provider: MD Jere Bryan.  Vanessadayami Kyrie would like a refill of the following medications:  rOPINIRole (REQUIP) 0.5 mg tablet Gloria Lyons MD]    Preferred pharmacy: [address]:

## 2017-09-20 RX ORDER — LIDOCAINE AND PRILOCAINE 25; 25 MG/G; MG/G
CREAM TOPICAL AS NEEDED
Qty: 30 G | Refills: 5 | Status: SHIPPED | OUTPATIENT
Start: 2017-09-20 | End: 2017-10-31

## 2017-09-20 NOTE — TELEPHONE ENCOUNTER
From: Ester Moore  To: Randy Ward MD  Sent: 9/19/2017 8:21 PM EDT  Subject: Medication Renewal Request    Original authorizing provider: MD Santo Arthur. Josy Tabares would like a refill of the following medications:  lidocaine-prilocaine (EMLA) topical cream Cassandra Lyons MD]    Preferred pharmacy: [facility] [address] [facility]:  dr Diann Lyons can you refill my medication lidocain-prilocaine topical cream for my left ankle injury ? Can you also refill the Antibiotic CLINDAMYCN 300 M. G. for my left ankle injury. thank you for your quick response to this message Santo Whitmore. [email or web address] [phone]

## 2017-09-27 DIAGNOSIS — N39.41 URGE INCONTINENCE: ICD-10-CM

## 2017-09-27 RX ORDER — OXYBUTYNIN CHLORIDE 5 MG/1
TABLET ORAL
Qty: 120 TAB | Refills: 0 | Status: SHIPPED | OUTPATIENT
Start: 2017-09-27 | End: 2017-10-03 | Stop reason: SDUPTHER

## 2017-10-03 ENCOUNTER — OFFICE VISIT (OUTPATIENT)
Dept: FAMILY MEDICINE CLINIC | Age: 57
End: 2017-10-03

## 2017-10-03 VITALS
HEIGHT: 56 IN | TEMPERATURE: 98 F | HEART RATE: 98 BPM | SYSTOLIC BLOOD PRESSURE: 131 MMHG | RESPIRATION RATE: 18 BRPM | WEIGHT: 145 LBS | DIASTOLIC BLOOD PRESSURE: 86 MMHG | OXYGEN SATURATION: 98 % | BODY MASS INDEX: 32.62 KG/M2

## 2017-10-03 DIAGNOSIS — I73.9 PAD (PERIPHERAL ARTERY DISEASE) (HCC): ICD-10-CM

## 2017-10-03 DIAGNOSIS — Z23 ENCOUNTER FOR IMMUNIZATION: Primary | ICD-10-CM

## 2017-10-03 DIAGNOSIS — N39.41 URGE INCONTINENCE: ICD-10-CM

## 2017-10-03 DIAGNOSIS — S91.002S WOUND OF LEFT ANKLE, SEQUELA: ICD-10-CM

## 2017-10-03 RX ORDER — OXYCODONE AND ACETAMINOPHEN 5; 325 MG/1; MG/1
1 TABLET ORAL
Qty: 45 TAB | Refills: 0 | Status: SHIPPED | OUTPATIENT
Start: 2017-10-03 | End: 2017-11-06 | Stop reason: SDUPTHER

## 2017-10-03 RX ORDER — CLINDAMYCIN HYDROCHLORIDE 300 MG/1
300 CAPSULE ORAL 3 TIMES DAILY
Qty: 30 CAP | Refills: 0 | Status: SHIPPED | OUTPATIENT
Start: 2017-10-03 | End: 2017-10-13

## 2017-10-03 RX ORDER — OXYBUTYNIN CHLORIDE 5 MG/1
TABLET ORAL
Qty: 120 TAB | Refills: 0 | Status: SHIPPED | OUTPATIENT
Start: 2017-10-03 | End: 2018-02-04 | Stop reason: SDUPTHER

## 2017-10-03 NOTE — MR AVS SNAPSHOT
Visit Information Date & Time Provider Department Dept. Phone Encounter #  
 10/3/2017  2:00 PM Sarah Lyons MD [facility] [phone] [id number] Your Appointments 10/3/2017  2:00 PM  
ESTABLISHED PATIENT with Apolonia Mckeon MD  
[facility] ([address]) Appt Note: med refill  
 [address] [place] [address] Road [address]  
[phone]  
  
   
 [address] [place] [address] [address] [address] Upcoming Health Maintenance Date Due  
 EYE EXAM RETINAL OR DILATED Q1 9/24/1968 COLONOSCOPY 9/24/1976 BREAST CANCER SCRN MAMMOGRAM 9/24/2008 MICROALBUMIN Q1 2/8/2017 FOOT EXAM Q1 3/4/2017 PAP AKA CERVICAL CYTOLOGY 6/19/2017 INFLUENZA AGE 9 TO ADULT 8/1/2017 HEMOGLOBIN A1C Q6M 1/5/2018 LIPID PANEL Q1 7/5/2018 DTaP/Tdap/Td series (2 - Td) 8/29/2025 Allergies as of 10/3/2017  Review Complete On: 10/3/2017 By: Apolonia Mckeon MD  
  
 Severity Noted Reaction Type Reactions Potassium Medium 03/03/2016    Other (comments) Patient states she will refuse IV Potassium due to irritation-Patient will accept PO form. Anaprox [Naproxen Sodium]  10/05/2011    Other (comments) Blood pressure drops Tolerates ibuprofen Codeine  10/05/2011    Nausea and Vomiting Tolerates oxycodone/acetaminophen Mobic [Meloxicam]  10/05/2011    Other (comments) Blood pressure drops Tolerates ibuprofen Tylenol [Acetaminophen]  08/29/2015    Other (comments) \"Stomach hurts\", hx of Crohn's disease Current Immunizations  Reviewed on 10/25/2016 Name Date Influenza Vaccine 1/14/2014 Influenza Vaccine (Quad) PF  Incomplete, 10/25/2016, 10/14/2015, 12/17/2014 Influenza Vaccine Split 10/4/2012, 10/5/2011 Pneumococcal Polysaccharide (PPSV-23) 1/14/2014 Tdap 8/29/2015  8:35 PM  
 ZZZ-RETIRED (DO NOT USE) Pneumococcal Vaccine (Unspecified Type) 10/5/2011 Not reviewed this visit You Were Diagnosed With   
  
 Codes Comments Encounter for immunization    -  Primary ICD-10-CM: Q60 ICD-9-CM: V03.89 PAD (peripheral artery disease) (HCC)     ICD-10-CM: I73.9 ICD-9-CM: 443.9 Wound of left ankle, sequela     ICD-10-CM: S91.002S ICD-9-CM: 906.1 Uncontrolled type 2 diabetes mellitus with other skin complication, with long-term current use of insulin (HCC)     ICD-10-CM: E11.628, E11.65, Z79.4 ICD-9-CM: 250.82, V58.67 Urge incontinence     ICD-10-CM: N39.41 
ICD-9-CM: 788.31 Vitals BP Pulse Temp Resp Height(growth percentile) Weight(growth percentile) 131/86 (BP 1 Location: Left arm, BP Patient Position: Sitting) 98 98 °F (36.7 °C) (Oral) 18 4' 8\" (1.422 m) 145 lb (65.8 kg) SpO2 BMI OB Status Smoking Status 98% 32.51 kg/m2 Hysterectomy Current Every Day Smoker Vitals History BMI and BSA Data Body Mass Index Body Surface Area 32.51 kg/m 2 1.61 m 2 Preferred Pharmacy Pharmacy Name Phone [facility] [address] Your Updated Medication List  
  
   
This list is accurate as of: 10/3/17  1:52 PM.  Always use your most recent med list.  
  
  
  
  
 aspirin delayed-release 81 mg tablet Commonly known as:  CUVJP-54 Take 1 Tab by mouth daily. clindamycin 300 mg capsule Commonly known as:  CLEOCIN Take 1 Cap by mouth three (3) times daily for 10 days. fish oil-omega-3 fatty acids 340-1,000 mg capsule Take 1 Cap by mouth daily. furosemide 40 mg tablet Commonly known as:  LASIX Take 1 Tab by mouth as needed (Swelling). gabapentin 300 mg capsule Commonly known as:  NEURONTIN Take 300 mg by mouth three (3) times daily. ibuprofen 800 mg tablet Commonly known as:  MOTRIN Take 1 Tab by mouth every eight (8) hours as needed for Pain.  
  
 lidocaine-prilocaine topical cream  
Commonly known as:  EMLA Apply  to affected area as needed for Pain. lisinopril 20 mg tablet Commonly known as:  Mesha November Take 20 mg by mouth nightly. metFORMIN 1,000 mg tablet Commonly known as:  GLUCOPHAGE Take 1 Tab by mouth two (2) times daily (with meals). methocarbamol 750 mg tablet Commonly known as:  ROBAXIN Take 1 Tab by mouth three (3) times daily. nystatin topical cream  
Commonly known as:  MYCOSTATIN Apply  to affected area two (2) times a day. oxybutynin 5 mg tablet Commonly known as:  DITROPAN  
TAKE ONE TABLET BY MOUTH 4 TIMES DAILY  
  
 oxyCODONE-acetaminophen 5-325 mg per tablet Commonly known as:  PERCOCET Take 1 Tab by mouth every four (4) hours as needed for Pain. Max Daily Amount: 6 Tabs. pioglitazone 30 mg tablet Commonly known as:  ACTOS Take 1 Tab by mouth daily. pravastatin 40 mg tablet Commonly known as:  PRAVACHOL  
TAKE ONE TABLET BY MOUTH NIGHTLY  
  
 rOPINIRole 0.5 mg tablet Commonly known as:  Stana Mc Take 1 Tab by mouth nightly. VITAMIN D3 1,000 unit tablet Generic drug:  cholecalciferol Take 1,000 Units by mouth daily. Prescriptions Printed Refills  
 oxyCODONE-acetaminophen (PERCOCET) 5-325 mg per tablet 0 Sig: Take 1 Tab by mouth every four (4) hours as needed for Pain. Max Daily Amount: 6 Tabs. Class: Print Route: Oral  
  
Prescriptions Sent to Pharmacy Refills  
 clindamycin (CLEOCIN) 300 mg capsule 0 Sig: Take 1 Cap by mouth three (3) times daily for 10 days. Class: Normal  
 Pharmacy: [place] [id number] Ph #: [phone] Route: Oral  
 oxybutynin (DITROPAN) 5 mg tablet 0 Sig: TAKE ONE TABLET BY MOUTH 4 TIMES DAILY Class: Normal  
 Pharmacy: [place] [id number] Ph #: [phone] We Performed the Following CBC WITH AUTOMATED DIFF [61415 CPT(R)] HEMOGLOBIN A1C WITH EAG [73098 CPT(R)] INFLUENZA VIRUS VAC QUAD,SPLIT,PRESV FREE SYRINGE IM [id number] CPT(R)] LIPID PANEL [83942 CPT(R)] METABOLIC PANEL, COMPREHENSIVE [91305 CPT(R)] UT IMMUNIZ ADMIN,1 SINGLE/COMB VAC/TOXOID [id number] CPT(R)] TSH 3RD GENERATION [41290 CPT(R)] Introducing [facility] SERVICES! Dear Gama Loyola: 
Thank you for requesting a [employer] account. Our records indicate that you already have an active [employer] account. You can access your account anytime at [email or web address] Did you know that you can access your hospital and ER discharge instructions at any time in [employer]? You can also review all of your test results from your hospital stay or ER visit. Additional Information If you have questions, please visit the Frequently Asked Questions section of the [employer] website at [email or web address] Remember, [employer] is NOT to be used for urgent needs. For medical emergencies, dial 911. Now available from your iPhone and Android! Please provide this summary of care documentation to your next provider. Your primary care clinician is listed as Janee Lyons. If you have any questions after today's visit, please call [phone].

## 2017-10-03 NOTE — PROGRESS NOTES
Pt here with  requesting refills on Percocet and Oxybutin. C/o 10/10 pain in left foot. Requesting to have a flu shot.

## 2017-10-03 NOTE — PROGRESS NOTES
Pt here with  requesting refills on Percocet and Oxybutin. C/o 10/10 pain in left foot. Requesting to have a flu shot. Pt is fasting today. Adonay Hernandez RTC today to follow up on chronic pain diagnosis. We discussed her arthralgias that is affecting her  foot. Significant changes since last visit: none. She is  able to do her normal daily activities. She reports the following adverse side effects: none. Least pain over the last week has been 5/10. Worst pain over the last week has been 10/10. Opioid Risk Tool Reviewed: YES    Aberrant behaviors: None. Urine Drug Screen: reviewed and up to date. Controlled substance agreement on file: YES.  reviewed:yes    Pill count is consistent with her prescription: n/a    Concomitant use of a benzodiazepine: no    Subjective: (As above and below)     Chief Complaint   Patient presents with    Medication Refill     she is a 59y.o. year old female who presents for evaluation. Reviewed PmHx, RxHx, FmHx, SocHx, AllgHx and updated in chart. Review of Systems - negative except as listed above    Objective:     Vitals:    10/03/17 1325   BP: 131/86   Pulse: 98   Resp: 18   Temp: 98 °F (36.7 °C)   TempSrc: Oral   SpO2: 98%   Weight: 145 lb (65.8 kg)   Height: 4' 8\" (1.422 m)     Physical Examination: General appearance - alert, well appearing, and in no distress  Mental status - normal mood, behavior, speech, dress, motor activity, and thought processes  Mouth - mucous membranes moist, pharynx normal without lesions  Chest - clear to auscultation, no wheezes, rales or rhonchi, symmetric air entry  Heart - normal rate, regular rhythm, normal S1, S2, no murmurs, rubs, clicks or gallops  Musculoskeletal - in wheelchair  Extremities - pt refused to allow wound to be inspected during visit, was able to review recent pictures    Assessment/ Plan:   1.  Encounter for immunization  - Influenza virus vaccine (QUADRIVALENT PRES FREE SYRINGE) IM (87820)  - AR IMMUNIZ ADMIN,1 SINGLE/COMB VAC/TOXOID    2. PAD (peripheral artery disease) ([facility] [address]  -advised to consider follow up with vascular surgery    3. Wound of left ankle, sequela  - clindamycin (CLEOCIN) 300 mg capsule; Take 1 Cap by mouth three (3) times daily for 10 days. Dispense: 30 Cap; Refill: 0  - oxyCODONE-acetaminophen (PERCOCET) 5-325 mg per tablet; Take 1 Tab by mouth every four (4) hours as needed for Pain. Max Daily Amount: 6 Tabs. Dispense: 45 Tab; Refill: 0    4. Uncontrolled type 2 diabetes mellitus with other skin complication, with long-term current use of insulin (HCC)  - CBC WITH AUTOMATED DIFF  - LIPID PANEL  - METABOLIC PANEL, COMPREHENSIVE  - TSH 3RD GENERATION  - HEMOGLOBIN A1C WITH EAG    5. Urge incontinence  - oxybutynin (DITROPAN) 5 mg tablet; TAKE ONE TABLET BY MOUTH 4 TIMES DAILY  Dispense: 120 Tab; Refill: 0     Follow-up Disposition: As needed  I have discussed the diagnosis with the patient and the intended plan as seen in the above orders. The patient has received an after-visit summary and questions were answered concerning future plans. Medication Side Effects and Warnings were discussed with patient: yes  Patient Labs were reviewed: yes  Patient Past Records were reviewed:  yes    Amanda Llamas M.D. This is a chronic problem that is worsened. Per review of available records and patients , there are not sign of overuse, misuse, diversion, or concerning side effects. Today we reviewed: the risk of overdose, addiction, and dependency proper storage and disposal of medications the goals of treatment (improve functionality, quality of life, and pain) alternative treatment options including non-narcotic modalities the risks and benefits of continuing with a narcotic based pain regimen  The following changes were made to the patients current treatment plan: nothing, medications refilled.

## 2017-10-04 LAB
ALBUMIN SERPL-MCNC: 4 G/DL (ref 3.5–5.5)
ALBUMIN/GLOB SERPL: 1.5 {RATIO} (ref 1.2–2.2)
ALP SERPL-CCNC: 94 IU/L (ref 39–117)
ALT SERPL-CCNC: 24 IU/L (ref 0–32)
AST SERPL-CCNC: 14 IU/L (ref 0–40)
BASOPHILS # BLD AUTO: 0.1 X10E3/UL (ref 0–0.2)
BASOPHILS NFR BLD AUTO: 1 %
BILIRUB SERPL-MCNC: <0.2 MG/DL (ref 0–1.2)
BUN SERPL-MCNC: 17 MG/DL (ref 6–24)
BUN/CREAT SERPL: 30 (ref 9–23)
CALCIUM SERPL-MCNC: 9.4 MG/DL (ref 8.7–10.2)
CHLORIDE SERPL-SCNC: 105 MMOL/L (ref 96–106)
CHOLEST SERPL-MCNC: 213 MG/DL (ref 100–199)
CO2 SERPL-SCNC: 21 MMOL/L (ref 18–29)
CREAT SERPL-MCNC: 0.56 MG/DL (ref 0.57–1)
EOSINOPHIL # BLD AUTO: 0.4 X10E3/UL (ref 0–0.4)
EOSINOPHIL NFR BLD AUTO: 3 %
ERYTHROCYTE [DISTWIDTH] IN BLOOD BY AUTOMATED COUNT: 14 % (ref 12.3–15.4)
EST. AVERAGE GLUCOSE BLD GHB EST-MCNC: 255 MG/DL
GLOBULIN SER CALC-MCNC: 2.7 G/DL (ref 1.5–4.5)
GLUCOSE SERPL-MCNC: 136 MG/DL (ref 65–99)
HBA1C MFR BLD: 10.5 % (ref 4.8–5.6)
HCT VFR BLD AUTO: 35.4 % (ref 34–46.6)
HDLC SERPL-MCNC: 31 MG/DL
HGB BLD-MCNC: 11.9 G/DL (ref 11.1–15.9)
IMM GRANULOCYTES # BLD: 0.1 X10E3/UL (ref 0–0.1)
IMM GRANULOCYTES NFR BLD: 1 %
INTERPRETATION, 910389: NORMAL
LDLC SERPL CALC-MCNC: 103 MG/DL (ref 0–99)
LYMPHOCYTES # BLD AUTO: 4.8 X10E3/UL (ref 0.7–3.1)
LYMPHOCYTES NFR BLD AUTO: 31 %
Lab: NORMAL
MCH RBC QN AUTO: 28.1 PG (ref 26.6–33)
MCHC RBC AUTO-ENTMCNC: 33.6 G/DL (ref 31.5–35.7)
MCV RBC AUTO: 84 FL (ref 79–97)
MONOCYTES # BLD AUTO: 0.6 X10E3/UL (ref 0.1–0.9)
MONOCYTES NFR BLD AUTO: 4 %
NEUTROPHILS # BLD AUTO: 9.5 X10E3/UL (ref 1.4–7)
NEUTROPHILS NFR BLD AUTO: 60 %
PLATELET # BLD AUTO: 378 X10E3/UL (ref 150–379)
POTASSIUM SERPL-SCNC: 3.6 MMOL/L (ref 3.5–5.2)
PROT SERPL-MCNC: 6.7 G/DL (ref 6–8.5)
RBC # BLD AUTO: 4.23 X10E6/UL (ref 3.77–5.28)
SODIUM SERPL-SCNC: 142 MMOL/L (ref 134–144)
TRIGL SERPL-MCNC: 394 MG/DL (ref 0–149)
TSH SERPL DL<=0.005 MIU/L-ACNC: 0.55 UIU/ML (ref 0.45–4.5)
VLDLC SERPL CALC-MCNC: 79 MG/DL (ref 5–40)
WBC # BLD AUTO: 15.5 X10E3/UL (ref 3.4–10.8)

## 2017-10-04 NOTE — PROGRESS NOTES
WBC count increased, concerned for infection in foot, please take antibiotic as written and follow up with surgery as soon as possible or wound clinic. Your cholesterol came back looking pretty good. Your triglycerides are slightly elevated. The best way to bring that number down is to incorporate more omega 3's into your diet. Here are some suggestions on how to do that:  - eat 2-3 serving of fish like salmon and trout per week  - eat lots of fresh dark leafy green vegetables  - incorporate more garlic into your diet  Blood sugar remains VERY poorly controlled which is contributing to chronic wound. Please confirm that insulin is being taking regularly. A message has been sent in [employer] and the lab work released to the patient.

## 2017-10-26 ENCOUNTER — OFFICE VISIT (OUTPATIENT)
Dept: SURGERY | Age: 57
End: 2017-10-26

## 2017-10-26 VITALS
DIASTOLIC BLOOD PRESSURE: 106 MMHG | SYSTOLIC BLOOD PRESSURE: 171 MMHG | OXYGEN SATURATION: 100 % | HEIGHT: 56 IN | HEART RATE: 100 BPM

## 2017-10-26 DIAGNOSIS — S91.302A OPEN WOUND OF LEFT FOOT, INITIAL ENCOUNTER: Primary | ICD-10-CM

## 2017-10-26 DIAGNOSIS — L98.499 ARTERIAL INSUFFICIENCY WITH ISCHEMIC ULCER (HCC): ICD-10-CM

## 2017-10-26 DIAGNOSIS — L03.116 CELLULITIS OF LEFT LOWER EXTREMITY: ICD-10-CM

## 2017-10-26 DIAGNOSIS — I77.1 ARTERIAL INSUFFICIENCY WITH ISCHEMIC ULCER (HCC): ICD-10-CM

## 2017-10-26 NOTE — PROGRESS NOTES
The patient is a 59 y.o. woman referred by Dr. Jossie Lyons regarding nonhealing wound on the left foot. The patient reports a history of polio and post polio syndrome. She has had multiple surgeries related to her polio over her life. She reports that her ability to stand is limited as she has partial flexion both of the hip and knee standing. She has to use a walker or be supported to stand and can only take a few steps. The patient reports that she has had the wound on her left foot for about 2 years. She has been told she has peripheral vascular disease. She was seen by Dr. Jing Mata earlier this month and on 10/3/17 had a white blood cell count of 15,000. The patient reports she has had pain in the left foot for a long time. She does take a narcotic for that pain. The patient's past medical history includes cerebral palsy, Crohn's disease, diabetes mellitus, gastroesophageal reflux disease, hypertension, mitral insufficiency, stroke. The patient does smoke 1 to 1-1/2 packs per day. She does not use alcohol. The patient's HbA1c was about 10.5 in March 2017 and getting blood sugars averaging around 250. The patient says she follows a diabetic diet and takes her medications. The patient does not have a history of anginal chest pain or irregular heart beat. She denies shortness of breath and engages in very little activity. Physical Examination:   GENERAL: Alert woman in no acute distress. Visit Vitals    BP (!) 171/106 (BP 1 Location: Left arm, BP Patient Position: Sitting)    Pulse 100    Ht 4' 8\" (1.422 m)    SpO2 100%   HEAD/NECK:  No jaundice, mass or thyromegaly. LUNGS:  Clear bilaterally without wheeze, rhonchi or rales. John Malcolm HEART:  Regular without murmur, gallop or rub. EXTREMITIES:  Lower extremity examination on the left revealed absence of palpable pedal pulses.  The patient was sitting in a wheelchair at the time of the exam. From the knee down, there was 1-2+ edema associated with erythema of the tissues. There was diffuse tenderness of the lower half of the lower leg. There was a wound which was about 4 cm wide x 6 cm long on the lateral proximal aspect of the left foot. There was granulation over about 80% of the surface with some fibrinous debris. The wound was tender as was the foot. The patient had a fairly brisk biphasic dorsalis pedis signal although the pulse was not palpable. Posterior tibial doppler signal could not be found. I had a long discussion with the patient and her  regarding her situation. I am concerned about the patient's poorly controlled diabetes and her active cigarette smoking in terms of ability to heal this wound. She shows clinical evidence of peripheral artery disease which, however, may not be the limiting factor in healing. I urged the patient to stop smoking completely. She said she did not believe she could do that. The patient shows evidence of cellulitis of her leg at present. I have recommended hospitalization so she can receive antibiotics and have diagnostic studies on the leg as well as wound care. The patient wanted to wait until next week before being admitted to the hospital. She has agreed to go to the ER in 5 days. I have asked that she have the staff contact me and I will arrange for admission to the hospital on the hospitalist service. I did discuss with the patient and her  the potential for this requiring amputation of the leg in order to achieve healing. Final Diagnosis: Nonhealing wound left foot, cellulitis of left leg, poorly controlled diabetes mellitus, peripheral artery disease. MedDATA/gwo     cc: MD BETI Monique MD

## 2017-10-26 NOTE — MR AVS SNAPSHOT
Visit Information Date & Time Provider Department Dept. Phone Encounter #  
 10/26/2017  2:20 PM Lennox Hoist, MD [facility] [address] [phone] [id number] Upcoming Health Maintenance Date Due  
 EYE EXAM RETINAL OR DILATED Q1 9/24/1968 COLONOSCOPY 9/24/1976 BREAST CANCER SCRN MAMMOGRAM 9/24/2008 MICROALBUMIN Q1 2/8/2017 FOOT EXAM Q1 3/4/2017 PAP AKA CERVICAL CYTOLOGY 6/19/2017 HEMOGLOBIN A1C Q6M 4/3/2018 LIPID PANEL Q1 10/3/2018 DTaP/Tdap/Td series (2 - Td) 8/29/2025 Allergies as of 10/26/2017  Review Complete On: 10/26/2017 By: Darcy Juarez Severity Noted Reaction Type Reactions Potassium Medium 03/03/2016    Other (comments) Patient states she will refuse IV Potassium due to irritation-Patient will accept PO form. Anaprox [Naproxen Sodium]  10/05/2011    Other (comments) Blood pressure drops Tolerates ibuprofen Codeine  10/05/2011    Nausea and Vomiting Tolerates oxycodone/acetaminophen Mobic [Meloxicam]  10/05/2011    Other (comments) Blood pressure drops Tolerates ibuprofen Tylenol [Acetaminophen]  08/29/2015    Other (comments) \"Stomach hurts\", hx of Crohn's disease Current Immunizations  Reviewed on 10/3/2017 Name Date Influenza Vaccine 1/14/2014 Influenza Vaccine (Quad) PF 10/3/2017, 10/25/2016, 10/14/2015, 12/17/2014 Influenza Vaccine Split 10/4/2012, 10/5/2011 Pneumococcal Polysaccharide (PPSV-23) 1/14/2014 Tdap 8/29/2015  8:35 PM  
 ZZZ-RETIRED (DO NOT USE) Pneumococcal Vaccine (Unspecified Type) 10/5/2011 Not reviewed this visit Vitals BP Pulse Height(growth percentile) SpO2 OB Status Smoking Status (!) 171/106 (BP 1 Location: Left arm, BP Patient Position: Sitting) 100 4' 8\" (1.422 m) 100% Hysterectomy Current Every Day Smoker Preferred Pharmacy Pharmacy Name Phone [facility] [address] Your Updated Medication List  
  
   
This list is accurate as of: 10/26/17  4:26 PM.  Always use your most recent med list.  
  
  
  
  
 aspirin delayed-release 81 mg tablet Commonly known as:  LNOMU-85 Take 1 Tab by mouth daily. fish oil-omega-3 fatty acids 340-1,000 mg capsule Take 1 Cap by mouth daily. furosemide 40 mg tablet Commonly known as:  LASIX Take 1 Tab by mouth as needed (Swelling). gabapentin 300 mg capsule Commonly known as:  NEURONTIN Take 300 mg by mouth three (3) times daily. ibuprofen 800 mg tablet Commonly known as:  MOTRIN Take 1 Tab by mouth every eight (8) hours as needed for Pain.  
  
 lidocaine-prilocaine topical cream  
Commonly known as:  EMLA Apply  to affected area as needed for Pain. lisinopril 20 mg tablet Commonly known as:  Thersia Kubas Take 20 mg by mouth nightly. metFORMIN 1,000 mg tablet Commonly known as:  GLUCOPHAGE Take 1 Tab by mouth two (2) times daily (with meals). methocarbamol 750 mg tablet Commonly known as:  ROBAXIN Take 1 Tab by mouth three (3) times daily. nystatin topical cream  
Commonly known as:  MYCOSTATIN Apply  to affected area two (2) times a day. oxybutynin 5 mg tablet Commonly known as:  DITROPAN  
TAKE ONE TABLET BY MOUTH 4 TIMES DAILY  
  
 oxyCODONE-acetaminophen 5-325 mg per tablet Commonly known as:  PERCOCET Take 1 Tab by mouth every four (4) hours as needed for Pain. Max Daily Amount: 6 Tabs. pioglitazone 30 mg tablet Commonly known as:  ACTOS Take 1 Tab by mouth daily. pravastatin 40 mg tablet Commonly known as:  PRAVACHOL  
TAKE ONE TABLET BY MOUTH NIGHTLY  
  
 rOPINIRole 0.5 mg tablet Commonly known as:  Sabine Otoole Take 1 Tab by mouth nightly. VITAMIN D3 1,000 unit tablet Generic drug:  cholecalciferol Take 1,000 Units by mouth daily. Introducing [facility] SERVICES! Dear [place]: 
Thank you for requesting a [employer] account. Our records indicate that you already have an active [employer] account. You can access your account anytime at [email or web address] Did you know that you can access your hospital and ER discharge instructions at any time in [employer] You can also review all of your test results from your hospital stay or ER visit. Additional Information If you have questions, please visit the Frequently Asked Questions section of the [employer] website at [email or web address] Remember, [employer] is NOT to be used for urgent needs. For medical emergencies, dial 911. Now available from your iPhone and Android! Please provide this summary of care documentation to your next provider. Your primary care clinician is listed as Janee Lyons. If you have any questions after today's visit, please call [phone].

## 2017-10-31 ENCOUNTER — HOSPITAL ENCOUNTER (INPATIENT)
Age: 57
LOS: 6 days | Discharge: HOME HEALTH | DRG: 638 | End: 2017-11-06
Attending: EMERGENCY MEDICINE | Admitting: INTERNAL MEDICINE
Payer: SELF-PAY

## 2017-10-31 ENCOUNTER — APPOINTMENT (OUTPATIENT)
Dept: GENERAL RADIOLOGY | Age: 57
DRG: 638 | End: 2017-10-31
Attending: EMERGENCY MEDICINE
Payer: SELF-PAY

## 2017-10-31 DIAGNOSIS — I73.9 PAD (PERIPHERAL ARTERY DISEASE) (HCC): ICD-10-CM

## 2017-10-31 DIAGNOSIS — E11.621 DIABETIC ULCER OF LEFT MIDFOOT ASSOCIATED WITH TYPE 2 DIABETES MELLITUS, WITH BONE INVOLVEMENT WITHOUT EVIDENCE OF NECROSIS (HCC): Primary | ICD-10-CM

## 2017-10-31 DIAGNOSIS — L97.426 DIABETIC ULCER OF LEFT MIDFOOT ASSOCIATED WITH TYPE 2 DIABETES MELLITUS, WITH BONE INVOLVEMENT WITHOUT EVIDENCE OF NECROSIS (HCC): Primary | ICD-10-CM

## 2017-10-31 DIAGNOSIS — L03.116 CELLULITIS OF LEFT LOWER EXTREMITY: ICD-10-CM

## 2017-10-31 PROBLEM — L03.90 CELLULITIS: Status: ACTIVE | Noted: 2017-10-31

## 2017-10-31 LAB
ALBUMIN SERPL-MCNC: 3.8 G/DL (ref 3.5–5)
ALBUMIN/GLOB SERPL: 1 {RATIO} (ref 1.1–2.2)
ALP SERPL-CCNC: 100 U/L (ref 45–117)
ALT SERPL-CCNC: 59 U/L (ref 12–78)
ANION GAP SERPL CALC-SCNC: 7 MMOL/L (ref 5–15)
AST SERPL-CCNC: 34 U/L (ref 15–37)
BASOPHILS # BLD: 0.1 K/UL (ref 0–0.1)
BASOPHILS NFR BLD: 1 % (ref 0–1)
BILIRUB SERPL-MCNC: 0.3 MG/DL (ref 0.2–1)
BUN SERPL-MCNC: 13 MG/DL (ref 6–20)
BUN/CREAT SERPL: 23 (ref 12–20)
CALCIUM SERPL-MCNC: 8.7 MG/DL (ref 8.5–10.1)
CHLORIDE SERPL-SCNC: 108 MMOL/L (ref 97–108)
CO2 SERPL-SCNC: 26 MMOL/L (ref 21–32)
CREAT SERPL-MCNC: 0.56 MG/DL (ref 0.55–1.02)
EOSINOPHIL # BLD: 0.3 K/UL (ref 0–0.4)
EOSINOPHIL NFR BLD: 3 % (ref 0–7)
ERYTHROCYTE [DISTWIDTH] IN BLOOD BY AUTOMATED COUNT: 14.3 % (ref 11.5–14.5)
GLOBULIN SER CALC-MCNC: 3.7 G/DL (ref 2–4)
GLUCOSE BLD STRIP.AUTO-MCNC: 146 MG/DL (ref 65–100)
GLUCOSE BLD STRIP.AUTO-MCNC: 244 MG/DL (ref 65–100)
GLUCOSE SERPL-MCNC: 202 MG/DL (ref 65–100)
HCT VFR BLD AUTO: 38.2 % (ref 35–47)
HGB BLD-MCNC: 12.6 G/DL (ref 11.5–16)
LACTATE SERPL-SCNC: 1.5 MMOL/L (ref 0.4–2)
LYMPHOCYTES # BLD: 3 K/UL (ref 0.8–3.5)
LYMPHOCYTES NFR BLD: 29 % (ref 12–49)
MCH RBC QN AUTO: 28.2 PG (ref 26–34)
MCHC RBC AUTO-ENTMCNC: 33 G/DL (ref 30–36.5)
MCV RBC AUTO: 85.5 FL (ref 80–99)
MONOCYTES # BLD: 0.6 K/UL (ref 0–1)
MONOCYTES NFR BLD: 6 % (ref 5–13)
NEUTS SEG # BLD: 6.3 K/UL (ref 1.8–8)
NEUTS SEG NFR BLD: 61 % (ref 32–75)
PLATELET # BLD AUTO: 316 K/UL (ref 150–400)
POTASSIUM SERPL-SCNC: 3.4 MMOL/L (ref 3.5–5.1)
PROT SERPL-MCNC: 7.5 G/DL (ref 6.4–8.2)
RBC # BLD AUTO: 4.47 M/UL (ref 3.8–5.2)
SERVICE CMNT-IMP: ABNORMAL
SERVICE CMNT-IMP: ABNORMAL
SODIUM SERPL-SCNC: 141 MMOL/L (ref 136–145)
WBC # BLD AUTO: 10.4 K/UL (ref 3.6–11)

## 2017-10-31 PROCEDURE — 74011636637 HC RX REV CODE- 636/637: Performed by: INTERNAL MEDICINE

## 2017-10-31 PROCEDURE — 87205 SMEAR GRAM STAIN: CPT | Performed by: SURGERY

## 2017-10-31 PROCEDURE — 85025 COMPLETE CBC W/AUTO DIFF WBC: CPT | Performed by: EMERGENCY MEDICINE

## 2017-10-31 PROCEDURE — 74011250637 HC RX REV CODE- 250/637: Performed by: EMERGENCY MEDICINE

## 2017-10-31 PROCEDURE — 99283 EMERGENCY DEPT VISIT LOW MDM: CPT

## 2017-10-31 PROCEDURE — 83605 ASSAY OF LACTIC ACID: CPT | Performed by: EMERGENCY MEDICINE

## 2017-10-31 PROCEDURE — 94761 N-INVAS EAR/PLS OXIMETRY MLT: CPT

## 2017-10-31 PROCEDURE — 74011000258 HC RX REV CODE- 258: Performed by: EMERGENCY MEDICINE

## 2017-10-31 PROCEDURE — 87077 CULTURE AEROBIC IDENTIFY: CPT | Performed by: SURGERY

## 2017-10-31 PROCEDURE — 73630 X-RAY EXAM OF FOOT: CPT

## 2017-10-31 PROCEDURE — 96366 THER/PROPH/DIAG IV INF ADDON: CPT

## 2017-10-31 PROCEDURE — 74011250637 HC RX REV CODE- 250/637: Performed by: INTERNAL MEDICINE

## 2017-10-31 PROCEDURE — 74011250636 HC RX REV CODE- 250/636: Performed by: INTERNAL MEDICINE

## 2017-10-31 PROCEDURE — 87040 BLOOD CULTURE FOR BACTERIA: CPT | Performed by: EMERGENCY MEDICINE

## 2017-10-31 PROCEDURE — 36415 COLL VENOUS BLD VENIPUNCTURE: CPT | Performed by: EMERGENCY MEDICINE

## 2017-10-31 PROCEDURE — 80053 COMPREHEN METABOLIC PANEL: CPT | Performed by: EMERGENCY MEDICINE

## 2017-10-31 PROCEDURE — 74011000258 HC RX REV CODE- 258: Performed by: INTERNAL MEDICINE

## 2017-10-31 PROCEDURE — 82962 GLUCOSE BLOOD TEST: CPT

## 2017-10-31 PROCEDURE — 96367 TX/PROPH/DG ADDL SEQ IV INF: CPT

## 2017-10-31 PROCEDURE — 96365 THER/PROPH/DIAG IV INF INIT: CPT

## 2017-10-31 PROCEDURE — 74011250636 HC RX REV CODE- 250/636: Performed by: EMERGENCY MEDICINE

## 2017-10-31 PROCEDURE — 65270000029 HC RM PRIVATE

## 2017-10-31 PROCEDURE — 87186 SC STD MICRODIL/AGAR DIL: CPT | Performed by: SURGERY

## 2017-10-31 RX ORDER — ONDANSETRON 2 MG/ML
4 INJECTION INTRAMUSCULAR; INTRAVENOUS
Status: DISCONTINUED | OUTPATIENT
Start: 2017-10-31 | End: 2017-11-06 | Stop reason: HOSPADM

## 2017-10-31 RX ORDER — ASPIRIN 81 MG/1
81 TABLET ORAL DAILY
Status: DISCONTINUED | OUTPATIENT
Start: 2017-11-01 | End: 2017-11-06 | Stop reason: HOSPADM

## 2017-10-31 RX ORDER — SODIUM CHLORIDE 0.9 % (FLUSH) 0.9 %
5-10 SYRINGE (ML) INJECTION EVERY 8 HOURS
Status: DISCONTINUED | OUTPATIENT
Start: 2017-10-31 | End: 2017-11-06 | Stop reason: HOSPADM

## 2017-10-31 RX ORDER — AMOXICILLIN 250 MG
1 CAPSULE ORAL DAILY PRN
Status: DISCONTINUED | OUTPATIENT
Start: 2017-10-31 | End: 2017-11-06 | Stop reason: HOSPADM

## 2017-10-31 RX ORDER — OXYCODONE AND ACETAMINOPHEN 5; 325 MG/1; MG/1
1 TABLET ORAL
Status: DISCONTINUED | OUTPATIENT
Start: 2017-10-31 | End: 2017-11-06 | Stop reason: HOSPADM

## 2017-10-31 RX ORDER — DEXTROSE 50 % IN WATER (D50W) INTRAVENOUS SYRINGE
12.5-25 AS NEEDED
Status: DISCONTINUED | OUTPATIENT
Start: 2017-10-31 | End: 2017-11-06 | Stop reason: HOSPADM

## 2017-10-31 RX ORDER — SODIUM CHLORIDE 0.9 % (FLUSH) 0.9 %
5-10 SYRINGE (ML) INJECTION AS NEEDED
Status: DISCONTINUED | OUTPATIENT
Start: 2017-10-31 | End: 2017-11-06 | Stop reason: HOSPADM

## 2017-10-31 RX ORDER — ROPINIROLE 0.25 MG/1
0.5 TABLET, FILM COATED ORAL
Status: DISCONTINUED | OUTPATIENT
Start: 2017-10-31 | End: 2017-11-06 | Stop reason: HOSPADM

## 2017-10-31 RX ORDER — OXYCODONE AND ACETAMINOPHEN 5; 325 MG/1; MG/1
2 TABLET ORAL
Status: COMPLETED | OUTPATIENT
Start: 2017-10-31 | End: 2017-10-31

## 2017-10-31 RX ORDER — INSULIN LISPRO 100 [IU]/ML
INJECTION, SOLUTION INTRAVENOUS; SUBCUTANEOUS
Status: DISCONTINUED | OUTPATIENT
Start: 2017-10-31 | End: 2017-11-06 | Stop reason: HOSPADM

## 2017-10-31 RX ORDER — POLYETHYLENE GLYCOL 3350 17 G/17G
17 POWDER, FOR SOLUTION ORAL DAILY
Status: DISCONTINUED | OUTPATIENT
Start: 2017-11-01 | End: 2017-11-06 | Stop reason: HOSPADM

## 2017-10-31 RX ORDER — VANCOMYCIN 1.75 GRAM/500 ML IN 0.9 % SODIUM CHLORIDE INTRAVENOUS
1750
Status: COMPLETED | OUTPATIENT
Start: 2017-10-31 | End: 2017-10-31

## 2017-10-31 RX ORDER — ENOXAPARIN SODIUM 100 MG/ML
40 INJECTION SUBCUTANEOUS EVERY 24 HOURS
Status: DISCONTINUED | OUTPATIENT
Start: 2017-10-31 | End: 2017-11-06 | Stop reason: HOSPADM

## 2017-10-31 RX ORDER — FUROSEMIDE 40 MG/1
40 TABLET ORAL
COMMUNITY
End: 2017-11-06

## 2017-10-31 RX ORDER — OXYBUTYNIN CHLORIDE 5 MG/1
15 TABLET, EXTENDED RELEASE ORAL EVERY EVENING
Status: DISCONTINUED | OUTPATIENT
Start: 2017-10-31 | End: 2017-11-06 | Stop reason: HOSPADM

## 2017-10-31 RX ORDER — IBUPROFEN 600 MG/1
600 TABLET ORAL
Status: DISCONTINUED | OUTPATIENT
Start: 2017-10-31 | End: 2017-11-06 | Stop reason: HOSPADM

## 2017-10-31 RX ORDER — MAGNESIUM SULFATE 100 %
4 CRYSTALS MISCELLANEOUS AS NEEDED
Status: DISCONTINUED | OUTPATIENT
Start: 2017-10-31 | End: 2017-11-06 | Stop reason: HOSPADM

## 2017-10-31 RX ORDER — LISINOPRIL 20 MG/1
20 TABLET ORAL
Status: DISCONTINUED | OUTPATIENT
Start: 2017-10-31 | End: 2017-11-06 | Stop reason: HOSPADM

## 2017-10-31 RX ORDER — HYDRALAZINE HYDROCHLORIDE 20 MG/ML
10 INJECTION INTRAMUSCULAR; INTRAVENOUS
Status: DISCONTINUED | OUTPATIENT
Start: 2017-10-31 | End: 2017-11-06 | Stop reason: HOSPADM

## 2017-10-31 RX ORDER — PRAVASTATIN SODIUM 40 MG/1
40 TABLET ORAL DAILY
Status: DISCONTINUED | OUTPATIENT
Start: 2017-11-01 | End: 2017-11-06 | Stop reason: HOSPADM

## 2017-10-31 RX ORDER — ACETAMINOPHEN 325 MG/1
650 TABLET ORAL
Status: DISCONTINUED | OUTPATIENT
Start: 2017-10-31 | End: 2017-11-06 | Stop reason: HOSPADM

## 2017-10-31 RX ADMIN — Medication 10 ML: at 18:08

## 2017-10-31 RX ADMIN — Medication 10 ML: at 21:57

## 2017-10-31 RX ADMIN — OXYCODONE HYDROCHLORIDE AND ACETAMINOPHEN 1 TABLET: 5; 325 TABLET ORAL at 16:49

## 2017-10-31 RX ADMIN — PIPERACILLIN SODIUM,TAZOBACTAM SODIUM 3.38 G: 3; .375 INJECTION, POWDER, FOR SOLUTION INTRAVENOUS at 18:08

## 2017-10-31 RX ADMIN — CEFTRIAXONE SODIUM 2 G: 2 INJECTION, POWDER, FOR SOLUTION INTRAMUSCULAR; INTRAVENOUS at 12:18

## 2017-10-31 RX ADMIN — ENOXAPARIN SODIUM 40 MG: 40 INJECTION SUBCUTANEOUS at 18:07

## 2017-10-31 RX ADMIN — ROPINIROLE HYDROCHLORIDE 0.5 MG: 0.25 TABLET, FILM COATED ORAL at 21:54

## 2017-10-31 RX ADMIN — OXYCODONE HYDROCHLORIDE AND ACETAMINOPHEN 1 TABLET: 5; 325 TABLET ORAL at 20:06

## 2017-10-31 RX ADMIN — INSULIN LISPRO 2 UNITS: 100 INJECTION, SOLUTION INTRAVENOUS; SUBCUTANEOUS at 21:54

## 2017-10-31 RX ADMIN — LISINOPRIL 20 MG: 20 TABLET ORAL at 21:53

## 2017-10-31 RX ADMIN — OXYBUTYNIN CHLORIDE 15 MG: 5 TABLET, FILM COATED, EXTENDED RELEASE ORAL at 18:07

## 2017-10-31 RX ADMIN — VANCOMYCIN HYDROCHLORIDE 1750 MG: 10 INJECTION, POWDER, LYOPHILIZED, FOR SOLUTION INTRAVENOUS at 12:40

## 2017-10-31 RX ADMIN — OXYCODONE HYDROCHLORIDE AND ACETAMINOPHEN 2 TABLET: 5; 325 TABLET ORAL at 10:47

## 2017-10-31 NOTE — PROGRESS NOTES
Bedside and Verbal shift change report given to Noe Schaffer (oncoming nurse) by Silvina Monreal (offgoing nurse). Report included the following information SBAR, Kardex, Intake/Output, MAR and Recent Results.

## 2017-10-31 NOTE — ED NOTES
Patient wants to eat, advised Dr Carmita Ariza needs to talk with Dr Cliff Patterson before she can have food.

## 2017-10-31 NOTE — H&P
Hospitalist Admission Note    NAME: Denisa Jimenes   :  1960   MRN:  [id number]     Date/Time:  10/31/2017 2:33 PM    Patient PCP: Bhargav Freeman MD  ________________________________________________________________________    My assessment of this patient's clinical condition and my plan of care is as follows. Assessment / Plan:  Nonhealing diabetic foot ulcer with LLE cellulitis and concern for OM in setting of PAD and uncontrolled diabetes  -XR with  \"Large lateral soft tissue ulceration with underlying erosive  changes concerning for acute osteomyelitis. \"  -Afebrile without leukocytosis. Lactic wnl.  -Will not let me see wound as dressing was changed in the ED and she is waiting for surgery to evaluate. -Received IV vanc and ceftriaxone in ED. Told her I may hold antibiotics to allow for bone/tissue sample with cultures but she is against intervention currently and would like to speak to surgery. For now will continue IV antibiotics vancomycin and zosyn. Follow cultures and surgery recs. Adjust as indicated  -Consult to wound care.   -Pain control.  -check ESR, CRP.  -Consult to Surgery, Dr. Shira Mccray, who has seen the patient outpatient. Will defer further imaging and vascular studies to surgery based on planned intervention that the patient is agreeable to. DM II, uncontrolled  -A1C 10.5  -hold pta actos and metformin  -accuchecks with SSI  -consult to DTC education  -Follow insulin requirement will likely need long acting at time of DC. HTN  -pta lisinopril    HLD  -pta statin    Hx of polio with residual BLE weakness  -pta requip    Tobacco use  -more than 1 PPD  -refuses nicotine patch      Code Status: full  Surrogate Decision Maker:   DVT Prophylaxis: lovenox  GI Prophylaxis: not indicated  Baseline: WC bound. Subjective:   CHIEF COMPLAINT: LLE cellulitis    HISTORY OF PRESENT ILLNESS:     Alexx Kendrick is a 59 y.o.    female with PMH of polio with residual LE weakness, DM II, PAD, HTN, HLD, and tobacco abuse presents with complaint of nonhealing diabetic foot ulcer now with warmth/erythema and concern for cellulitis. Patient has history of nonhealing foot wound for the last two years. She was recently referred to surgery, Dr. Loretta Corona, as an outpatient who recommended she present to the ED but patient decided to wait through the weekend before presenting. Says she feels her left leg is becoming more erythematous and warm. In ED patient is afebrile with stable vitals. XR left foot with  \"Large lateral soft tissue ulceration with underlying erosive changes concerning for acute osteomyelitis. \" labs with wbc 10.4. Started on IV antibiotics in the ED. We were asked to admit for work up and evaluation of the above problems.      Past Medical History:   Diagnosis Date    Acid indigestion     or heartburn    Arthritis     scince childhood    Cerebral palsy (HCC)     Crohn disease ([address]     Diabetes ([address]     Financial difficulties 10/4/2012    GERD (gastroesophageal reflux disease)     Hemorrhoids     Hernia of unspecified site of abdominal cavity without mention of obstruction or gangrene     Hypertension     Mitral regurgitation 10/5/2011    Muscle spasm 6/19/2014    chronic    PAD (peripheral artery disease) ([address] 6/19/2014    Polio     Primary osteoarthritis of both hips 8/9/2012    Skin disease, bullous     frequent    Spastic paralysis ([address]     Stiff joint     Stroke ([address]     Tobacco abuse 8/10/2012    Urine incontinence     controlling    Weakness     of an arm or leg        Past Surgical History:   Procedure Laterality Date    HX APPENDECTOMY      pt states surgery at 27 y.o    HX GYN  1995    hysterectomy    HX HEENT      toncillectomy childhood    HX ORTHOPAEDIC      Bone transplants, tendons stretched, legs turned    HX TRANSPLANT      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     MUSCLE-SKIN FLAP,LEG      NE BONE MARROW HARVEST TRANSPLANTATION ALLOGENEIC         Social History   Substance Use Topics    Smoking status: Current Every Day Smoker     Packs/day: 1.50     Years: 38.00    Smokeless tobacco: Former User    Alcohol use No        Family History   Problem Relation Age of Onset    Diabetes Mother     Alcohol abuse Mother     Heart Disease Mother     Hypertension Mother     Cancer Father     Alcohol abuse Father     Diabetes Maternal Grandmother     Stroke Maternal Grandmother      Allergies   Allergen Reactions    Potassium Other (comments)     Patient states she will refuse IV Potassium due to irritation-Patient will accept PO form.  Anaprox [Naproxen Sodium] Other (comments)     Blood pressure drops  Tolerates ibuprofen    Codeine Nausea and Vomiting     Tolerates oxycodone/acetaminophen    Mobic [Meloxicam] Other (comments)     Blood pressure drops  Tolerates ibuprofen    Tylenol [Acetaminophen] Other (comments)     \"Stomach hurts\", hx of Crohn's disease         Prior to Admission medications    Medication Sig Start Date End Date Taking? Authorizing Provider   oxyCODONE-acetaminophen (PERCOCET) 5-325 mg per tablet Take 1 Tab by mouth every four (4) hours as needed for Pain. Max Daily Amount: 6 Tabs. 10/3/17  Yes Lalit Lyons MD   oxybutynin (DITROPAN) 5 mg tablet TAKE ONE TABLET BY MOUTH 4 TIMES DAILY 10/3/17  Yes Lalit Lyons MD   metFORMIN (GLUCOPHAGE) 1,000 mg tablet Take 1 Tab by mouth two (2) times daily (with meals). 8/30/17  Yes Lalit Lyons MD   rOPINIRole (REQUIP) 0.5 mg tablet Take 1 Tab by mouth nightly. 8/21/17  Yes Janee Lyons MD   ibuprofen (MOTRIN) 800 mg tablet Take 1 Tab by mouth every eight (8) hours as needed for Pain. 7/29/17  Yes Lalit Lyons MD   pioglitazone (ACTOS) 30 mg tablet Take 1 Tab by mouth daily.  7/7/17  Yes Lalit Lyons MD   pravastatin (PRAVACHOL) 40 mg tablet TAKE ONE TABLET BY MOUTH NIGHTLY 1/3/17  Yes Karyna Rowe Anusha Lyons MD   lisinopril (PRINIVIL, ZESTRIL) 20 mg tablet Take 20 mg by mouth nightly. Yes Historical Provider   cholecalciferol (VITAMIN D3) 1,000 unit tablet Take 1,000 Units by mouth daily. Yes Historical Provider   aspirin delayed-release (ASPIR-81) 81 mg tablet Take 1 Tab by mouth daily. 7/28/14  Yes Reva Alexander MD   furosemide (LASIX) 40 mg tablet Take 40 mg by mouth daily as needed (swelling). Historical Provider   fish oil-omega-3 fatty acids 340-1,000 mg capsule Take 1 Cap by mouth daily. Historical Provider       REVIEW OF SYSTEMS:     I am not able to complete the review of systems because:    The patient is intubated and sedated    The patient has altered mental status due to his acute medical problems    The patient has baseline aphasia from prior stroke(s)    The patient has baseline dementia and is not reliable historian    The patient is in acute medical distress and unable to provide information           Total of 12 systems reviewed as follows:       POSITIVE= underlined text  Negative = text not underlined  General:  fever, chills, sweats, generalized weakness, weight loss/gain,      loss of appetite   Eyes:    blurred vision, eye pain, loss of vision, double vision  ENT:    rhinorrhea, pharyngitis   Respiratory:   cough, sputum production, SOB, DARLING, wheezing, pleuritic pain   Cardiology:   chest pain, palpitations, orthopnea, PND, edema, syncope   Gastrointestinal:  abdominal pain , N/V, diarrhea, dysphagia, constipation, bleeding   Genitourinary:  frequency, urgency, dysuria, hematuria, incontinence   Muskuloskeletal :  arthralgia, myalgia, back pain  Hematology:  easy bruising, nose or gum bleeding, lymphadenopathy   Dermatological: rash, ulceration, pruritis, color change / jaundice, erythema LLE  Endocrine:   hot flashes or polydipsia   Neurological:  headache, dizziness, confusion, focal weakness, paresthesia,     Speech difficulties, memory loss, gait difficulty  Psychological: Feelings of anxiety, depression, agitation    Objective:   VITALS:    Visit Vitals    /87 (BP 1 Location: Right arm, BP Patient Position: At rest)    Pulse 95    Temp 98.5 °F (36.9 °C)    Resp 16    Wt 65.8 kg (145 lb)    SpO2 100%    BMI 32.51 kg/m2       PHYSICAL EXAM:    General:    Alert, cooperative, no distress, appears stated age. HEENT: Atraumatic, anicteric sclerae, pink conjunctivae     No oral ulcers, mucosa moist, throat clear, dentition fair  Neck:  Supple, symmetrical,  thyroid: non tender  Lungs:   Clear to auscultation bilaterally. No Wheezing or Rhonchi. No rales. Chest wall:  No tenderness  No Accessory muscle use. Heart:   Regular  rhythm,  No  murmur   1+ edema bilateral lower extremities  Abdomen:   Soft, non-tender. Not distended. Bowel sounds normal  Extremities: No cyanosis. No clubbing,      Skin turgor normal, Capillary refill normal, Radial dial pulse 2+  Skin:     Erythema, warmth of LLE, dressing left foot CDI- she will not allow me to take this down as it was just wrapped. Says she will let surgery evaluate. Psych:  Good insight. Not depressed. Not anxious or agitated. Neurologic: EOMs intact. No facial asymmetry. No aphasia or slurred speech. Symmetrical strength in upper extremities, 4/5 strength bilateral lower extremities chronic, Sensation grossly intact.  Alert and oriented X 4.     _______________________________________________________________________  Care Plan discussed with:    Comments   Patient y    Family  y    RN y    Care Manager                    Consultant:      _______________________________________________________________________  Expected  Disposition:   Home with Family x   HH/PT/OT/RN    SNF/LTC    PAULA    ________________________________________________________________________  TOTAL TIME:  61 Minutes    Critical Care Provided     Minutes non procedure based      Comments    x Reviewed previous records   >50% of visit spent in counseling and coordination of care  Discussion with patient and/or family and questions answered       ________________________________________________________________________  Signed: Desire Gonzalez MD    Procedures: see electronic medical records for all procedures/Xrays and details which were not copied into this note but were reviewed prior to creation of Plan. LAB DATA REVIEWED:    Recent Results (from the past 24 hour(s))   CBC WITH AUTOMATED DIFF    Collection Time: 10/31/17 10:44 AM   Result Value Ref Range    WBC 10.4 3.6 - 11.0 K/uL    RBC 4.47 3.80 - 5.20 M/uL    HGB 12.6 11.5 - 16.0 g/dL    HCT 38.2 35.0 - 47.0 %    MCV 85.5 80.0 - 99.0 FL    MCH 28.2 26.0 - 34.0 PG    MCHC 33.0 30.0 - 36.5 g/dL    RDW 14.3 11.5 - 14.5 %    PLATELET 327 301 - 505 K/uL    NEUTROPHILS 61 32 - 75 %    LYMPHOCYTES 29 12 - 49 %    MONOCYTES 6 5 - 13 %    EOSINOPHILS 3 0 - 7 %    BASOPHILS 1 0 - 1 %    ABS. NEUTROPHILS 6.3 1.8 - 8.0 K/UL    ABS. LYMPHOCYTES 3.0 0.8 - 3.5 K/UL    ABS. MONOCYTES 0.6 0.0 - 1.0 K/UL    ABS. EOSINOPHILS 0.3 0.0 - 0.4 K/UL    ABS. BASOPHILS 0.1 0.0 - 0.1 K/UL   METABOLIC PANEL, COMPREHENSIVE    Collection Time: 10/31/17 10:44 AM   Result Value Ref Range    Sodium 141 136 - 145 mmol/L    Potassium 3.4 (L) 3.5 - 5.1 mmol/L    Chloride 108 97 - 108 mmol/L    CO2 26 21 - 32 mmol/L    Anion gap 7 5 - 15 mmol/L    Glucose 202 (H) 65 - 100 mg/dL    BUN 13 6 - 20 MG/DL    Creatinine 0.56 0.55 - 1.02 MG/DL    BUN/Creatinine ratio 23 (H) 12 - 20      GFR est AA >60 >60 ml/min/1.73m2    GFR est non-AA >60 >60 ml/min/1.73m2    Calcium 8.7 8.5 - 10.1 MG/DL    Bilirubin, total 0.3 0.2 - 1.0 MG/DL    ALT (SGPT) 59 12 - 78 U/L    AST (SGOT) 34 15 - 37 U/L    Alk.  phosphatase 100 45 - 117 U/L    Protein, total 7.5 6.4 - 8.2 g/dL    Albumin 3.8 3.5 - 5.0 g/dL    Globulin 3.7 2.0 - 4.0 g/dL    A-G Ratio 1.0 (L) 1.1 - 2.2     LACTIC ACID    Collection Time: 10/31/17 10:44 AM   Result Value Ref Range    Lactic acid 1.5 0.4 - 2.0 MMOL/L

## 2017-10-31 NOTE — ROUTINE PROCESS
TRANSFER - OUT REPORT:    Verbal report given to [place] on [address]  being transferred to [facility](unit) for routine progression of care       Report consisted of patients Situation, Background, Assessment and   Recommendations(SBAR). Information from the following report(s) SBAR, Kardex, ED Summary, Intake/Output and MAR was reviewed with the receiving nurse. Lines:   Peripheral IV 10/31/17 Right Forearm (Active)   Site Assessment Clean, dry, & intact 10/31/2017 10:43 AM   Phlebitis Assessment 0 10/31/2017 10:43 AM   Infiltration Assessment 0 10/31/2017 10:43 AM   Dressing Status Clean, dry, & intact 10/31/2017 10:43 AM   Dressing Type Transparent 10/31/2017 10:43 AM   Hub Color/Line Status Pink;Capped;Flushed 10/31/2017 10:43 AM        Opportunity for questions and clarification was provided.       Patient transported with:   [employer]

## 2017-10-31 NOTE — ED NOTES
Bedside and Verbal shift change report given to Florencio Ayala RN (oncoming nurse) by this RN (offgoing nurse). Report included the following information SBAR.

## 2017-10-31 NOTE — PROGRESS NOTES
Pharmacy Clarification of Prior to Admission Medication Regimen     The patient was interviewed regarding clarification of the prior to admission medication regimen.  was present in room and obtained permission from patient to discuss drug regimen with visitor(s) present. Patient was questioned regarding use of any other inhalers, topical products, over the counter medications, herbal medications, vitamin products or ophthalmic/nasal/otic medication use. Information Obtained From: Patient, Patient's , RX Query    Pertinent Pharmacy Findings:   Patient stated that she was given nystatin cream for skin irritation, but thinks she was allergic to this agent because she 'broke out in bright red welps on my [patient] stomach'. Patient stopped taking this agent after this reaction happened.  Patient stated that she completed a 2 week regimen of clindamycin 300 mg (QID) on 10/25/2017.  fish oil-omega-3 fatty acids 340-1,000 mg capsule: Patient stated that she has this OTC agent at home, but is not currently taking.  furosemide (LASIX) 40 mg tablet: Patient stated that she has this agent at home, but is not currently taking. PTA medication list was corrected to the following:     Prior to Admission Medications   Prescriptions Last Dose Informant Patient Reported? Taking?   aspirin delayed-release (ASPIR-81) 81 mg tablet 10/30/2017 at Unknown time Self No Yes   Sig: Take 1 Tab by mouth daily. cholecalciferol (VITAMIN D3) 1,000 unit tablet 10/30/2017 at Unknown time Self Yes Yes   Sig: Take 1,000 Units by mouth daily. fish oil-omega-3 fatty acids 340-1,000 mg capsule Not Taking at Unknown time Self Yes No   Sig: Take 1 Cap by mouth daily. furosemide (LASIX) 40 mg tablet Not Taking at Unknown time Self Yes No   Sig: Take 40 mg by mouth daily as needed (swelling).    ibuprofen (MOTRIN) 800 mg tablet 10/30/2017 at Unknown time Self No Yes   Sig: Take 1 Tab by mouth every eight (8) hours as needed for Pain. lisinopril (PRINIVIL, ZESTRIL) 20 mg tablet 10/30/2017 at Unknown time Self Yes Yes   Sig: Take 20 mg by mouth nightly. metFORMIN (GLUCOPHAGE) 1,000 mg tablet 10/30/2017 at Unknown time Self No Yes   Sig: Take 1 Tab by mouth two (2) times daily (with meals). oxyCODONE-acetaminophen (PERCOCET) 5-325 mg per tablet 10/30/2017 at Unknown time Self No Yes   Sig: Take 1 Tab by mouth every four (4) hours as needed for Pain. Max Daily Amount: 6 Tabs. oxybutynin (DITROPAN) 5 mg tablet 10/30/2017 at Unknown time Self No Yes   Sig: TAKE ONE TABLET BY MOUTH 4 TIMES DAILY   pioglitazone (ACTOS) 30 mg tablet 10/30/2017 at Unknown time Self No Yes   Sig: Take 1 Tab by mouth daily. pravastatin (PRAVACHOL) 40 mg tablet 10/30/2017 at Unknown time Self No Yes   Sig: TAKE ONE TABLET BY MOUTH NIGHTLY   rOPINIRole (REQUIP) 0.5 mg tablet 10/30/2017 at Unknown time Self No Yes   Sig: Take 1 Tab by mouth nightly.       Facility-Administered Medications: None          Thank you,  Hubert Healy CPhT  Medication History Pharmacy Technician

## 2017-10-31 NOTE — ED NOTES
Assumed care of patient. Patient placed in position of comfort. Call bell in reach. Skin warm and dry. Respirations even and unlabored. In no apparent distress at this time. Pt presents to the ED with c/o pain to Lt lateral ankle wound. Reports pmh DM and acquired he wound 2 years ago d/t hitting her leg on a chair. Was treated with a course of abx about two and a half weeks ago. Saw Dr. Jaiden Gaspar on 10/26, was told to come to the ED for IV antibiotics. Small amount of redness and warmth noted to bilateral lower extremities. Large wound noted to Lt lateral ankle (approximately 5 cm x 4 cm) granulose tissue noted; no drainage seen. PMS intact. Denies fevers/chills.  remains at bedside. Pillows provided for comfort. Will continue to monitor.

## 2017-10-31 NOTE — PROGRESS NOTES
Pharmacy Automatic Renal Dosing Protocol - Antimicrobials    Indication for Antimicrobials: Cellulitis    Current Regimen of Each Antimicrobial:  Vancomycin 1750 mg x 1 then 1000 mg Q16H (Start Date 10/31; Day # 1)    Previous Antimicrobial Therapy:    Goal Vancomycin Level: 10-15 mcg/ml    Significant Cultures: ND    Paralysis, amputations, malnutrition: Polio/Post Polio Syndrome, Cerebral Palsy,     Labs:  Recent Labs      10/31/17   1044   CREA  0.56   BUN  13   WBC  10.4     Temp (24hrs), Av.5 °F (36.9 °C), Min:98.5 °F (36.9 °C), Max:98.5 °F (36.9 °C)      Creatinine Clearance (mL/min) or Dialysis: 50 ml/min    Impression/Plan: Vancomycin 1750 mg loading dose then 1000 mg Q16H with anticipated trough of 14 mcg/ml. Pharmacy will follow daily and adjust medications as appropriate for renal function and/or serum levels. Thank you,  Eli Guy, [facility]          Recommended duration of therapy  [email or web address]/[place]/[facility]/Pharmacy/Clinical%20Companion/[email or web address]. docx    Renal Dosing  [email or web address]/[place]/[facility]/Pharmacy/Clinical%20Companion/Renal%[email or web address]. pdf

## 2017-10-31 NOTE — ED PROVIDER NOTES
[address]  EMERGENCY DEPARTMENT HISTORY AND PHYSICAL EXAM         Date of Service: 10/31/2017   Patient Name: Barb Barajas   YOB: 1960  Medical Record Number: [id number]    History of Presenting Illness     Chief Complaint   Patient presents with    Referral / Consult     patient states that she was advised to come to ED for admission of a wound to left foot        History Provided By:  patient    Additional History:   Barb Barajas is a 59 y.o. female with PMhx significant for DM, Crohn's disease, HTN, Polio and Cerebral Palsy who presents via wheelchair to the ED with cc of wound to the left foot with associated nausea that worsened last week. Pt reports that she injured this foot about two years ago after hitting her LLE on a office chair. She states she developed cellulitis to this foot but notes that even after having surgery it never resolved. She reports her foot was last evaluated on 10/26 by Dr. Yobani Peck, General Surgery, who referred the pt to the ED for IV abx. She notes she was last on abx about 3 weeks ago per her PCP. She denies any sx's of fever, chills, vomiting, diarrhea, abd pain and CP. Social Hx: + Tobacco, - EtOH, - Illicit Drugs    There are no other complaints, changes or physical findings at this time.     Primary Care Provider: Darin Horton MD     Past History     Past Medical History:   Past Medical History:   Diagnosis Date    Acid indigestion     or heartburn    Arthritis     scince childhood    Cerebral palsy ([address]     Cro[facility] [address]     Diabetes ([facility] [address]     Financial difficulties 10/4/2012    GERD (gastroesophageal reflux disease)     Hemorrhoids     Hernia of unspecified site of abdominal cavity without mention of obstruction or gangrene     Hypertension     Mitral regurgitation 10/5/2011    Muscle spasm 6/19/2014    chronic    PAD (peripheral artery disease) ([address] 6/19/2014    Polio     Primary osteoarthritis of both hips 8/9/2012    Skin disease, bullous     frequent    Spastic paralysis (HCC)     Stiff joint     Stroke ([facility] [address]     Tobacco abuse 8/10/2012    Urine incontinence     controlling    Weakness     of an arm or leg        Past Surgical History:   Past Surgical History:   Procedure Laterality Date    HX APPENDECTOMY      pt states surgery at 27 y.o    HX GYN  1995    hysterectomy    HX HEENT      toncillectomy childhood    HX ORTHOPAEDIC      Bone transplants, tendons stretched, legs turned    HX TRANSPLANT      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     MUSCLE-SKIN FLAP,LEG      [place] BONE MARROW HARVEST TRANSPLANTATION ALLOGENEIC          Family History:   Family History   Problem Relation Age of Onset    Diabetes Mother     Alcohol abuse Mother     Heart Disease Mother     Hypertension Mother     Cancer Father     Alcohol abuse Father     Diabetes Maternal Grandmother     Stroke Maternal Grandmother         Social History:   Social History   Substance Use Topics    Smoking status: Current Every Day Smoker     Packs/day: 1.50     Years: 38.00    Smokeless tobacco: Former User    Alcohol use No        Allergies: Allergies   Allergen Reactions    Potassium Other (comments)     Patient states she will refuse IV Potassium due to irritation-Patient will accept PO form.  Anaprox [Naproxen Sodium] Other (comments)     Blood pressure drops  Tolerates ibuprofen    Codeine Nausea and Vomiting     Tolerates oxycodone/acetaminophen    Mobic [Meloxicam] Other (comments)     Blood pressure drops  Tolerates ibuprofen    Tylenol [Acetaminophen] Other (comments)     \"Stomach hurts\", hx of Crohn's disease         Review of Systems   Review of Systems   Constitutional: Negative for chills, fatigue and fever. HENT: Negative for congestion, rhinorrhea and sore throat. Eyes: Negative for pain, discharge and visual disturbance.    Respiratory: Negative for cough, chest tightness, shortness of breath and wheezing. Cardiovascular: Negative for chest pain, palpitations and leg swelling. Gastrointestinal: Positive for nausea. Negative for abdominal pain, constipation, diarrhea and vomiting. Genitourinary: Negative for dysuria, frequency and hematuria. Musculoskeletal: Negative for arthralgias, back pain and myalgias. Skin: Positive for wound. Negative for rash. Neurological: Negative for dizziness, weakness, light-headedness and headaches. Psychiatric/Behavioral: Negative. All other systems reviewed and are negative. Physical Exam  Physical Exam   Constitutional: She is oriented to person, place, and time. She appears well-developed and well-nourished. No distress. HENT:   Head: Normocephalic and atraumatic. Eyes: EOM are normal. Right eye exhibits no discharge. Left eye exhibits no discharge. No scleral icterus. Neck: Normal range of motion. Neck supple. No tracheal deviation present. Cardiovascular: Regular rhythm and normal heart sounds. Exam reveals no gallop and no friction rub. No murmur heard. Tachycardic in low 100s. Pulmonary/Chest: Effort normal and breath sounds normal. No respiratory distress. She has no wheezes. She has no rales. Abdominal: Soft. She exhibits no distension. There is no tenderness. Musculoskeletal: Normal range of motion. She exhibits no edema. BL lower extremity weakness due to hx of cerebral palsy and polio  Erythema to RLE; erythema to LLE, left lateral foot has chronic wound with presense of granulated tissue with surrounding erythema and warmth. Unable to palpate DP pulse bilaterally   Lymphadenopathy:     She has no cervical adenopathy. Neurological: She is alert and oriented to person, place, and time. Skin: Skin is warm and dry. No rash noted. Psychiatric: She has a normal mood and affect. Nursing note and vitals reviewed. Medical Decision Making   I am the first provider for this patient.      I reviewed the vital signs, available nursing notes, past medical history, past surgical history, family history and social history. Old Medical Records: Old medical records. Provider Notes:   Differential includes cellulitis, osteomyelitis, PAD, thrombosis    ED Course:  9:42 AM   Initial assessment performed. The patients presenting problems have been discussed, and they are in agreement with the care plan formulated and outlined with them. I have encouraged them to ask questions as they arise throughout their visit. Progress Notes:   10:48 AM  Nursing able to doppler BL DP pulses. CONSULT NOTE:   11:50 AM  Elvi Wood MD spoke with Dr. Sharron Acharya,   Specialty: Hospitalist  Discussed pt's hx, disposition, and available diagnostic and imaging results. Reviewed care plans. Consultant will evaluate pt for admission. He recommends consulting with Dr. Luiz Hensley. Written by JANINE Dupree, as dictated by Elvi Wood MD.    CONSULT NOTE:  12:28 PM  Elvi Wood MD spoke with Dr. Luiz Hensley,  Specialty: General Surgery  Discussed pt's hx, disposition, and available diagnostic and imaging results. Reviewed care plans. Consultant agrees with plans as outlined. He states he will come evaluate the pt's wound and determine what other vascular studies need to be obtained.    Written by JANINE Dupree, as dictated by Elvi Wood MD.      Diagnostic Study Results   Labs -      Recent Results (from the past 12 hour(s))   CBC WITH AUTOMATED DIFF    Collection Time: 10/31/17 10:44 AM   Result Value Ref Range    WBC 10.4 3.6 - 11.0 K/uL    RBC 4.47 3.80 - 5.20 M/uL    HGB 12.6 11.5 - 16.0 g/dL    HCT 38.2 35.0 - 47.0 %    MCV 85.5 80.0 - 99.0 FL    MCH 28.2 26.0 - 34.0 PG    MCHC 33.0 30.0 - 36.5 g/dL    RDW 14.3 11.5 - 14.5 %    PLATELET 300 899 - 896 K/uL    NEUTROPHILS 61 32 - 75 %    LYMPHOCYTES 29 12 - 49 %    MONOCYTES 6 5 - 13 %    EOSINOPHILS 3 0 - 7 %    BASOPHILS 1 0 - 1 %    ABS. NEUTROPHILS 6.3 1.8 - 8.0 K/UL    ABS. LYMPHOCYTES 3.0 0.8 - 3.5 K/UL    ABS. MONOCYTES 0.6 0.0 - 1.0 K/UL    ABS. EOSINOPHILS 0.3 0.0 - 0.4 K/UL    ABS. BASOPHILS 0.1 0.0 - 0.1 K/UL   METABOLIC PANEL, COMPREHENSIVE    Collection Time: 10/31/17 10:44 AM   Result Value Ref Range    Sodium 141 136 - 145 mmol/L    Potassium 3.4 (L) 3.5 - 5.1 mmol/L    Chloride 108 97 - 108 mmol/L    CO2 26 21 - 32 mmol/L    Anion gap 7 5 - 15 mmol/L    Glucose 202 (H) 65 - 100 mg/dL    BUN 13 6 - 20 MG/DL    Creatinine 0.56 0.55 - 1.02 MG/DL    BUN/Creatinine ratio 23 (H) 12 - 20      GFR est AA >60 >60 ml/min/1.73m2    GFR est non-AA >60 >60 ml/min/1.73m2    Calcium 8.7 8.5 - 10.1 MG/DL    Bilirubin, total 0.3 0.2 - 1.0 MG/DL    ALT (SGPT) 59 12 - 78 U/L    AST (SGOT) 34 15 - 37 U/L    Alk. phosphatase 100 45 - 117 U/L    Protein, total 7.5 6.4 - 8.2 g/dL    Albumin 3.8 3.5 - 5.0 g/dL    Globulin 3.7 2.0 - 4.0 g/dL    A-G Ratio 1.0 (L) 1.1 - 2.2     LACTIC ACID    Collection Time: 10/31/17 10:44 AM   Result Value Ref Range    Lactic acid 1.5 0.4 - 2.0 MMOL/L       Radiologic Studies -  The following have been ordered and reviewed:  XR FOOT LT MIN 3 V   Final Result        CT Results  (Last 48 hours)    None        CXR Results  (Last 48 hours)    None            Vital Signs-Reviewed the patient's vital signs. Patient Vitals for the past 12 hrs:   Temp Pulse Resp BP SpO2   10/31/17 1000 - 89 14 121/76 100 %   10/31/17 0937 98.5 °F (36.9 °C) (!) 110 15 (!) 196/94 97 %       Medications Given in the ED:  Medications   cefTRIAXone (ROCEPHIN) 2 g in 0.9% sodium chloride (MBP/ADV) 50 mL (2 g IntraVENous New Bag 10/31/17 1218)   vancomycin (VANCOCIN) 1750 mg in  ml infusion (not administered)   oxyCODONE-acetaminophen (PERCOCET) 5-325 mg per tablet 2 Tab (2 Tabs Oral Given 10/31/17 1047)       Diagnosis:  Clinical Impression:   1.  Diabetic ulcer of left midfoot associated with type 2 diabetes mellitus, with bone involvement without evidence of necrosis ([facility] [address]    2. Cellulitis of left lower extremity    3. PAD (peripheral artery disease) (HCC)       Disposition:  Admit Note:  12:29 PM  Patient is being admitted to the hospital. The results of their tests and reasons for their admission have been discussed with the patient and/or available family. They convey their agreement and understanding for the need to be admitted and for their admission diagnosis. Written by Kyra Mott ED Scribe, as dictated by Tien Valdez MD.  _______________________________   Attestations: This is note is prepared by Kyra Mott, acting as Scribe for MD Tien Fenton MD The scribe's documentation has been prepared under my direction and personally reviewed by me in its entirety.  I confirm that the note above accurately reflects all work, treatment, procedures, and medical decision making performed by me.   _______________________________

## 2017-10-31 NOTE — CONSULTS
Surgery Consult    Subjective:      Louie Rey is a 59 y.o. female previously seen in the office on 10/26/2017 regarding nonhealing wound on the left foot. The patient reports a history of cerebral palsy,  polio and post polio syndrome. She reports multiple surgeries related to her polio over her life, including surgery on her feet. She reports that her ability to stand is limited as she has partial flexion both of the hip and knee standing. She has to use a walker or be supported to stand and can only take a few steps.      The patient reports that she has had the wound on her left foot for about 2 years. She has been told she has peripheral vascular disease. She was seen by Dr. Ansel Cheadle earlier this month and on 10/3/17 had a white blood cell count of 15,000. The patient reports she has had pain in the left foot for a long time. She does take a narcotic for that pain.      The patient's past medical history includes cerebral palsy, Crohn's disease, diabetes mellitus, gastroesophageal reflux disease, hypertension, mitral insufficiency, stroke. The patient does smoke 1 to 1-1/2 packs per day. She does not use alcohol. The patient's HbA1c was about 10.5 in March 2017 and getting blood sugars averaging around 250. The patient says she follows a diabetic diet and takes her medications.      The patient does not have a history of anginal chest pain or irregular heart beat. She denies shortness of breath and engages in very little activity. I advised hospital admission last week related to leg cellulitis, uncontrolled diabetes, foot wond with possible osteomyelitis.   She agreed to come to the ER today.         Past Medical History:   Diagnosis Date    Acid indigestion     or heartburn    Arthritis     scince childhood    Cerebral palsy (HCC)     Cro[facility] disease ([facility] [address]     Diabetes ([facility] [address]     Financial difficulties 10/4/2012    GERD (gastroesophageal reflux disease)     Hemorrhoids     Hernia of unspecified site of abdominal cavity without mention of obstruction or gangrene     Hypertension     Mitral regurgitation 10/5/2011    Muscle spasm 6/19/2014    chronic    PAD (peripheral artery disease) ([facility] [address] 6/19/2014    Polio     Primary osteoarthritis of both hips 8/9/2012    Skin disease, bullous     frequent    Spastic paralysis ([facility] [address]     Stiff joint     Stroke ([facility] [address]     Tobacco abuse 8/10/2012    Urine incontinence     controlling    Weakness     of an arm or leg     Past Surgical History:   Procedure Laterality Date    HX APPENDECTOMY      pt states surgery at 27 y.o    HX GYN  1995    hysterectomy    HX HEENT      toncillectomy childhood    HX ORTHOPAEDIC      Bone transplants, tendons stretched, legs turned    HX TRANSPLANT      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     MUSCLE-SKIN FLAP,LEG      [place] BONE MARROW HARVEST TRANSPLANTATION ALLOGENEIC        Family History   Problem Relation Age of Onset    Diabetes Mother     Alcohol abuse Mother     Heart Disease Mother     Hypertension Mother     Cancer Father     Alcohol abuse Father     Diabetes Maternal Grandmother     Stroke Maternal Grandmother      Social History     Social History    Marital status:      Spouse name: N/A    Number of children: N/A    Years of education: N/A     Social History Main Topics    Smoking status: Current Every Day Smoker     Packs/day: 1.50     Years: 38.00    Smokeless tobacco: Former User    Alcohol use No    Drug use: No    Sexual activity: Yes     Partners: Male     Other Topics Concern    None     Social History Narrative      Current Facility-Administered Medications   Medication Dose Route Frequency Provider Last Rate Last Dose    [START ON 11/1/2017] vancomycin (VANCOCIN) 1,000 mg in 0.9% sodium chloride (MBP/ADV) 250 mL  1,000 mg IntraVENous Bg Rae MD         Current Outpatient Prescriptions   Medication Sig Dispense Refill    oxyCODONE-acetaminophen (PERCOCET) 5-325 mg per tablet Take 1 Tab by mouth every four (4) hours as needed for Pain. Max Daily Amount: 6 Tabs. 45 Tab 0    oxybutynin (DITROPAN) 5 mg tablet TAKE ONE TABLET BY MOUTH 4 TIMES DAILY 120 Tab 0    metFORMIN (GLUCOPHAGE) 1,000 mg tablet Take 1 Tab by mouth two (2) times daily (with meals). 60 Tab 5    rOPINIRole (REQUIP) 0.5 mg tablet Take 1 Tab by mouth nightly. 30 Tab 5    ibuprofen (MOTRIN) 800 mg tablet Take 1 Tab by mouth every eight (8) hours as needed for Pain. 30 Tab 2    pioglitazone (ACTOS) 30 mg tablet Take 1 Tab by mouth daily. 30 Tab 5    pravastatin (PRAVACHOL) 40 mg tablet TAKE ONE TABLET BY MOUTH NIGHTLY 30 Tab 5    lisinopril (PRINIVIL, ZESTRIL) 20 mg tablet Take 20 mg by mouth nightly.  cholecalciferol (VITAMIN D3) 1,000 unit tablet Take 1,000 Units by mouth daily.  aspirin delayed-release (ASPIR-81) 81 mg tablet Take 1 Tab by mouth daily. 100 Tab prn    furosemide (LASIX) 40 mg tablet Take 40 mg by mouth daily as needed (swelling).  fish oil-omega-3 fatty acids 340-1,000 mg capsule Take 1 Cap by mouth daily. Allergies   Allergen Reactions    Potassium Other (comments)     Patient states she will refuse IV Potassium due to irritation-Patient will accept PO form.  Anaprox [Naproxen Sodium] Other (comments)     Blood pressure drops  Tolerates ibuprofen    Codeine Nausea and Vomiting     Tolerates oxycodone/acetaminophen    Mobic [Meloxicam] Other (comments)     Blood pressure drops  Tolerates ibuprofen    Tylenol [Acetaminophen] Other (comments)     \"Stomach hurts\", hx of Crohn's disease        Review of Systems:  Pertinent items are noted in the History of Present Illness.     Objective:      Patient Vitals for the past 8 hrs:   BP Temp Pulse Resp SpO2 Weight   10/31/17 1500 (!) 151/127 - 87 16 98 % -   10/31/17 1410 137/80 - 95 16 98 % -   10/31/17 1300 117/79 - 98 16 96 % -   10/31/17 1221 135/87 - 95 16 100 % -   10/31/17 1000 121/76 - 89 14 100 % -   10/31/17  (!) 196/94 98.5 °F (36.9 °C) (!) 110 15 97 % 65.8 kg (145 lb)       Temp (24hrs), Av.5 °F (36.9 °C), Min:98.5 °F (36.9 °C), Max:98.5 °F (36.9 °C)      Physical Exam:  Obese woman in wheelchair, no acute distress, appears chronically ill. HEAD/NECK: No jaundice, mass or thyromegaly. LUNGS: Clear bilaterally without wheeze, rhonchi or rales. [place] HEART: Regular without murmur, gallop or rub. Abdomen:  Soft, non tender, no mass. NEURO: Patient is alert and oriented x 3 and moves all extremities equally. Facial movement is symmetrical. Speech was normal.   EXT:  Lower extremities small with atrophic muscles. Erythema of skin right and left lower legs, greater on the left. Left foot has open wound approximately 2.5 x 6 cm proximal lateral aspect of foot including ankle with pale granulation, firm over lateral foot without obvious exposed bone. Small amount of purulent exudate on surface. Surrounding skin is pink, no necrosis. Foot is diffusely tender as is wound and lower leg. No ulcers on right foot. No palpable pulses left foot    I took a swab culture of the wound. Xray left foot reviewed - major deformity of mid foot bones with erosions suggesting possible osteomyelitis. Assessment:     Hospital Problems  Date Reviewed: 10/3/2017          Codes Class Noted POA    Cellulitis ICD-10-CM: L03.90  ICD-9-CM: 682.9  10/31/2017 Unknown            PAD     Non healing wound left foot    Plan:     Patient is being admitted for IV antibiotics for cellulitis of leg. Culture pending of wound. I will order non invasive arterial study. I will discuss with Podiatry the issue of possible osteomyelitis. I havre concern as to whether the foot can be salvaged.     Signed By: Mandi Dimas MD     2017

## 2017-10-31 NOTE — IP AVS SNAPSHOT
[address] [facility] 
[phone] Patient: Neha Enriquez MRN: [id number] LZO:[id number]/[id number] About your hospitalization You were admitted on:  October 31, 2017 You last received care in the:  \[facility] [place] [facility]\"" 3 ORTHOPEDICS You were discharged on:  November 6, 2017 Why you were hospitalized Your primary diagnosis was:  Not on File Your diagnoses also included:  Cellulitis, Open Wound Of Left Foot, Chronic Ulcer Of Left Foot (Hcc), Cellulitis Of Left Lower Leg, Pseudomonas Aeruginosa Infection Things You Need To Do (next 8 weeks) Follow up with Kimani Vo MD in 1 week(s)  
hospital f/u Phone:  [phone] Where:  [address] [facility] [address] [facility], [address] [address] Monday Nov 06, 2017 Follow up with [address] [place] [address] This will be the provider of your home health needs. If you dd not hear from them within 24-48 hours post discharge, please give them a call. Phone:  [phone] Where:  [address] [facility], [address] [place] [address] Thursday Nov 09, 2017 ESTABLISHED PATIENT with Norah Cohen MD at 11:10 AM  
Where:  [address] [facility] ([facility]) Discharge Orders None A check flora indicates which time of day the medication should be taken. My Medications STOP taking these medications   
 furosemide 40 mg tablet Commonly known as:  LASIX TAKE these medications as instructed Instructions Each Dose to Equal  
 Morning Noon Evening Bedtime  
 aspirin delayed-release 81 mg tablet Commonly known as:  ZGMIZ-54 Your last dose was: Your next dose is: Take 1 Tab by mouth daily. 81 mg  
    
   
   
   
  
 fish oil-omega-3 fatty acids 340-1,000 mg capsule Your last dose was: Your next dose is: Take 1 Cap by mouth daily. 1 Cap  
    
   
   
   
  
 ibuprofen 800 mg tablet Commonly known as:  MOTRIN Your last dose was: Your next dose is: Take 1 Tab by mouth every eight (8) hours as needed for Pain. 800 mg  
    
   
   
   
  
 levoFLOXacin 750 mg tablet Commonly known as:  Rachel Vasquez Your last dose was: Your next dose is: Take 1 Tab by mouth Daily (before breakfast) for 10 days. 750 mg  
    
   
   
   
  
 lisinopril 20 mg tablet Commonly known as:  Ora Bee Your last dose was: Your next dose is: Take 20 mg by mouth nightly. 20 mg  
    
   
   
   
  
 metFORMIN 1,000 mg tablet Commonly known as:  GLUCOPHAGE Your last dose was: Your next dose is: Take 1 Tab by mouth two (2) times daily (with meals). 1000 mg  
    
   
   
   
  
 oxybutynin 5 mg tablet Commonly known as:  LEIVRVVU Your last dose was: Your next dose is: TAKE ONE TABLET BY MOUTH 4 TIMES DAILY  
     
   
   
   
  
 oxyCODONE-acetaminophen 5-325 mg per tablet Commonly known as:  PERCOCET Your last dose was: Your next dose is: Take 1 Tab by mouth every four (4) hours as needed for Pain. Max Daily Amount: 6 Tabs. 1 Tab  
    
   
   
   
  
 pioglitazone 30 mg tablet Commonly known as:  ACTOS Your last dose was: Your next dose is: Take 1 Tab by mouth daily. 30 mg  
    
   
   
   
  
 pravastatin 40 mg tablet Commonly known as:  PRAVACHOL Your last dose was: Your next dose is: TAKE ONE TABLET BY MOUTH NIGHTLY  
     
   
   
   
  
 rOPINIRole 0.5 mg tablet Commonly known as:  Neal Peoples Your last dose was: Your next dose is: Take 1 Tab by mouth nightly. 0.5 mg  
    
   
   
   
  
 Saccharomyces boulardii 250 mg capsule Commonly known as:  PROBIOTIC (S.BOULARDII) Your last dose was: Your next dose is: Take 1 Cap by mouth two (2) times a day for 10 days. 250 mg  
    
   
   
   
  
 VITAMIN D3 1,000 unit tablet Generic drug:  cholecalciferol Your last dose was: Your next dose is: Take 1,000 Units by mouth daily. 1000 Units Where to Get Your Medications These medications were sent to [address], [facility], [address] Phone:  [phone]  
  levoFLOXacin 750 mg tablet Saccharomyces boulardii 250 mg capsule Discharge Instructions HOSPITALIST DISCHARGE INSTRUCTIONS 
 
NAME: Sharon Valle :  1960 MRN:  [id number] Date/Time:  2017 1:48 PM 
 
ADMIT DATE: 10/31/2017 DISCHARGE DATE: 2017 · It is important that you take the medication exactly as they are prescribed. · Keep your medication in the bottles provided by the pharmacist and keep a list of the medication names, dosages, and times to be taken in your wallet. · Do not take other medications without consulting your doctor. What to do at [facility] Recommended diet:  Cardiac Diet, Diabetic Diet and Low fat, Low cholesterol Recommended activity: Activity as tolerated Wound care  dress with santyl, telfa, foam and gauze ( deandra) daily Per wound care recommendations Check BP, blood glucse twice daily, keep log, call PCP if BP > 150/90 or < 100/60. Or Blood glucose > 200 or < 90 If you have questions regarding the hospital related prescriptions or hospital related issues please call [facility] at [phone].  You can always direct your questions to your primary care doctor if you are unable to reach your hospital physician; your PCP works as an extension of your hospital doctor just like your hospital doctor is an extension of your PCP for your time at the [facility], [facility]) If you experience any of the following symptoms then please call your primary care physician or return to the emergency room if you cannot get hold of your doctor: 
 
Fever, chills, nausea, vomiting, or persistent diarrhea Worsening weakness or new problems with your speech or balance Dark stools or visible blood in your stools New Leg swelling or shortness of breath as this could be signs of a clot Additional Instructions: 
 
 
Bring these papers with you to your follow up appointments. The papers will help your doctors be sure to continue the care plan from the hospital. 
 
 
 
 
 
 
Information obtained by : 
I understand that if any problems occur once I am at home I am to contact my physician. I understand and acknowledge receipt of the instructions indicated above. Physician's or R.N.'s Signature                                                                  Date/Time Patient or Representative Signature [employer] Announcement We are excited to announce that we are making your provider's discharge notes available to you in [employer]. You will see these notes when they are completed and signed by the physician that discharged you from your recent hospital stay. If you have any questions or concerns about any information you see in [employer], please call the Health Information Department where you were seen or reach out to your Primary Care Provider for more information about your plan of care. Introducing [facility]! Dear [facility]: 
Thank you for requesting a [employer] account.   Our records indicate that you already have an active [employer] account. You can access your account anytime at [email or web address] [email or web address]. CrossReader/[employer] [place] Did you know that you can access your hospital and ER discharge instructions at any time in [employer]? You can also review all of your test results from your hospital stay or ER visit. Additional Information If you have questions, please visit the Frequently Asked Questions section of the [employer] website at [email or web address] [email or web address]. [employer] [place]/. Remember, [employer] is NOT to be used for urgent needs. For medical emergencies, dial 911. Now available from your iPhone and Android! Providers Seen During Your Hospitalization Provider Specialty Primary office phone Tien Valdez MD Emergency Medicine [phone] Mike Cristina MD Internal Medicine [phone] Your Primary Care Physician (PCP) Primary Care Physician Office Phone Office Fax Wagnerkeron Moser [phone] You are allergic to the following Allergen Reactions Potassium Other (comments) Patient states she will refuse IV Potassium due to irritation-Patient will accept PO form. Anaprox (Naproxen Sodium) Other (comments) Blood pressure drops Tolerates ibuprofen Codeine Nausea and Vomiting Tolerates oxycodone/acetaminophen Mobic (Meloxicam) Other (comments) Blood pressure drops Tolerates ibuprofen Tylenol (Acetaminophen) Other (comments) \"Stomach hurts\", hx of Crohn's disease Recent Documentation Weight BMI OB Status Smoking Status 65.8 kg 32.51 kg/m2 Hysterectomy Current Every Day Smoker Emergency Contacts Name Discharge Info Relation Home Work Mobile Scooter Martell CAREGIVER [3] Spouse [3] [phone] Patient Belongings The following personal items are in your possession at time of discharge: 
     Visual Aid: None Please provide this summary of care documentation to your next provider. Signatures-by signing, you are acknowledging that this After Visit Summary has been reviewed with you and you have received a copy. Patient Signature:  ____________________________________________________________ Date:  ____________________________________________________________  
  
[facility] Provider Signature:  ____________________________________________________________ Date:  ____________________________________________________________

## 2017-11-01 PROBLEM — S91.302A OPEN WOUND OF LEFT FOOT: Status: ACTIVE | Noted: 2017-11-01

## 2017-11-01 LAB
ANION GAP SERPL CALC-SCNC: 8 MMOL/L (ref 5–15)
BUN SERPL-MCNC: 14 MG/DL (ref 6–20)
BUN/CREAT SERPL: 32 (ref 12–20)
CALCIUM SERPL-MCNC: 8.5 MG/DL (ref 8.5–10.1)
CHLORIDE SERPL-SCNC: 108 MMOL/L (ref 97–108)
CO2 SERPL-SCNC: 25 MMOL/L (ref 21–32)
CREAT SERPL-MCNC: 0.44 MG/DL (ref 0.55–1.02)
CRP SERPL-MCNC: 0.69 MG/DL (ref 0–0.6)
ERYTHROCYTE [DISTWIDTH] IN BLOOD BY AUTOMATED COUNT: 14.1 % (ref 11.5–14.5)
ERYTHROCYTE [SEDIMENTATION RATE] IN BLOOD: 21 MM/HR (ref 0–30)
GLUCOSE BLD STRIP.AUTO-MCNC: 171 MG/DL (ref 65–100)
GLUCOSE BLD STRIP.AUTO-MCNC: 222 MG/DL (ref 65–100)
GLUCOSE BLD STRIP.AUTO-MCNC: 266 MG/DL (ref 65–100)
GLUCOSE BLD STRIP.AUTO-MCNC: 282 MG/DL (ref 65–100)
GLUCOSE SERPL-MCNC: 178 MG/DL (ref 65–100)
HCT VFR BLD AUTO: 36.2 % (ref 35–47)
HGB BLD-MCNC: 12 G/DL (ref 11.5–16)
MCH RBC QN AUTO: 29.2 PG (ref 26–34)
MCHC RBC AUTO-ENTMCNC: 33.1 G/DL (ref 30–36.5)
MCV RBC AUTO: 88.1 FL (ref 80–99)
PLATELET # BLD AUTO: 303 K/UL (ref 150–400)
POTASSIUM SERPL-SCNC: 3.5 MMOL/L (ref 3.5–5.1)
RBC # BLD AUTO: 4.11 M/UL (ref 3.8–5.2)
SERVICE CMNT-IMP: ABNORMAL
SODIUM SERPL-SCNC: 141 MMOL/L (ref 136–145)
WBC # BLD AUTO: 10.4 K/UL (ref 3.6–11)

## 2017-11-01 PROCEDURE — 74011250636 HC RX REV CODE- 250/636: Performed by: INTERNAL MEDICINE

## 2017-11-01 PROCEDURE — 65270000029 HC RM PRIVATE

## 2017-11-01 PROCEDURE — 77030011256 HC DRSG MEPILEX <16IN NO BORD MOLN -A

## 2017-11-01 PROCEDURE — 93923 UPR/LXTR ART STDY 3+ LVLS: CPT

## 2017-11-01 PROCEDURE — 74011636637 HC RX REV CODE- 636/637: Performed by: INTERNAL MEDICINE

## 2017-11-01 PROCEDURE — 86140 C-REACTIVE PROTEIN: CPT | Performed by: INTERNAL MEDICINE

## 2017-11-01 PROCEDURE — 74011250637 HC RX REV CODE- 250/637: Performed by: INTERNAL MEDICINE

## 2017-11-01 PROCEDURE — 74011250636 HC RX REV CODE- 250/636: Performed by: EMERGENCY MEDICINE

## 2017-11-01 PROCEDURE — 36415 COLL VENOUS BLD VENIPUNCTURE: CPT | Performed by: INTERNAL MEDICINE

## 2017-11-01 PROCEDURE — 85652 RBC SED RATE AUTOMATED: CPT | Performed by: INTERNAL MEDICINE

## 2017-11-01 PROCEDURE — 80048 BASIC METABOLIC PNL TOTAL CA: CPT | Performed by: INTERNAL MEDICINE

## 2017-11-01 PROCEDURE — 82962 GLUCOSE BLOOD TEST: CPT

## 2017-11-01 PROCEDURE — 74011000258 HC RX REV CODE- 258: Performed by: INTERNAL MEDICINE

## 2017-11-01 PROCEDURE — 77030018846 HC SOL IRR STRL H20 ICUM -A

## 2017-11-01 PROCEDURE — 85027 COMPLETE CBC AUTOMATED: CPT | Performed by: INTERNAL MEDICINE

## 2017-11-01 RX ADMIN — INSULIN LISPRO 2 UNITS: 100 INJECTION, SOLUTION INTRAVENOUS; SUBCUTANEOUS at 08:21

## 2017-11-01 RX ADMIN — VANCOMYCIN HYDROCHLORIDE 1000 MG: 1 INJECTION, POWDER, LYOPHILIZED, FOR SOLUTION INTRAVENOUS at 23:22

## 2017-11-01 RX ADMIN — LISINOPRIL 20 MG: 20 TABLET ORAL at 21:32

## 2017-11-01 RX ADMIN — VANCOMYCIN HYDROCHLORIDE 1000 MG: 1 INJECTION, POWDER, LYOPHILIZED, FOR SOLUTION INTRAVENOUS at 04:50

## 2017-11-01 RX ADMIN — OXYBUTYNIN CHLORIDE 15 MG: 5 TABLET, FILM COATED, EXTENDED RELEASE ORAL at 17:56

## 2017-11-01 RX ADMIN — PRAVASTATIN SODIUM 40 MG: 40 TABLET ORAL at 08:22

## 2017-11-01 RX ADMIN — Medication 10 ML: at 09:42

## 2017-11-01 RX ADMIN — OXYCODONE HYDROCHLORIDE AND ACETAMINOPHEN 1 TABLET: 5; 325 TABLET ORAL at 14:13

## 2017-11-01 RX ADMIN — OXYCODONE HYDROCHLORIDE AND ACETAMINOPHEN 1 TABLET: 5; 325 TABLET ORAL at 00:53

## 2017-11-01 RX ADMIN — INSULIN LISPRO 3 UNITS: 100 INJECTION, SOLUTION INTRAVENOUS; SUBCUTANEOUS at 17:57

## 2017-11-01 RX ADMIN — PIPERACILLIN SODIUM,TAZOBACTAM SODIUM 3.38 G: 3; .375 INJECTION, POWDER, FOR SOLUTION INTRAVENOUS at 09:41

## 2017-11-01 RX ADMIN — ROPINIROLE HYDROCHLORIDE 0.5 MG: 0.25 TABLET, FILM COATED ORAL at 21:33

## 2017-11-01 RX ADMIN — INSULIN LISPRO 3 UNITS: 100 INJECTION, SOLUTION INTRAVENOUS; SUBCUTANEOUS at 21:42

## 2017-11-01 RX ADMIN — PIPERACILLIN SODIUM,TAZOBACTAM SODIUM 3.38 G: 3; .375 INJECTION, POWDER, FOR SOLUTION INTRAVENOUS at 02:02

## 2017-11-01 RX ADMIN — OXYCODONE HYDROCHLORIDE AND ACETAMINOPHEN 1 TABLET: 5; 325 TABLET ORAL at 20:19

## 2017-11-01 RX ADMIN — PIPERACILLIN SODIUM,TAZOBACTAM SODIUM 3.38 G: 3; .375 INJECTION, POWDER, FOR SOLUTION INTRAVENOUS at 17:56

## 2017-11-01 RX ADMIN — OXYCODONE HYDROCHLORIDE AND ACETAMINOPHEN 1 TABLET: 5; 325 TABLET ORAL at 09:59

## 2017-11-01 RX ADMIN — ASPIRIN 81 MG: 81 TABLET, COATED ORAL at 08:22

## 2017-11-01 RX ADMIN — Medication 10 ML: at 21:43

## 2017-11-01 RX ADMIN — INSULIN LISPRO 5 UNITS: 100 INJECTION, SOLUTION INTRAVENOUS; SUBCUTANEOUS at 12:48

## 2017-11-01 NOTE — PROGRESS NOTES
Surgery    Arterial studies confirm PAD, actually worse on right than left. The patient has no palpable femoral pulses. Dr Rocio Miller is going to review xray images and I will discuss with him. I will schedule aortogram and runoff with possible angioplasty for tomorrow. Risks versus benefits reviewed with the patient. Ruisks include bleeding, infection, arterial occlusion, loss of leg, etc.  She understands and wishes to proceed.     Ignacia More MD

## 2017-11-01 NOTE — PROGRESS NOTES
Patient reports taking toujeo 35 units BID, Metformin and actos for blood sugar control at home. msg sent to Dr. Yin Landrum.

## 2017-11-01 NOTE — PROGRESS NOTES
Pt is a 60 y/o  female admitted on 10/31 with a dx of Cellulitis. Pt was alert, oriented during assessment--pt spouse also present with consent. Pt is connected to PCP in the community and has supportive family. Pt family able to provide transportation at d/c as well as assist with transportation to follow-up appts. Pt reports living in single story residence, [facility]. Pt admitted w/o insurance, SW offered Care Card application, pt said she already completed/sbmitted application in late July(?). Pt has hx of HH through [facility], listed preference as [facility] should she need wound care assistance after d/c. SW will continue to follow for d/c needs. Care Management Interventions  PCP Verified by CM: Yes  Mode of Transport at Discharge: Other (see comment) (Pt spouse to provide transportation at d/c)  Transition of Care Consult (CM Consult): Discharge Planning ([facility]  Pt has used [facility] in the passt and listed  it as presence.)  Discharge Durable Medical Equipment:  (Pt has rollator and wheelchair at residence.)  Physical Therapy Consult: No  Occupational Therapy Consult: No  Current Support Network: Lives with Spouse (Pt resides in one story residence with spouse.)  Confirm Follow Up Transport:  (Pt spouse able to provide trasnsport to follow-up appts as needed.)  Plan discussed with Pt/Family/Caregiver: Yes      MARK Chavez, [address] [facility]  [phone]

## 2017-11-01 NOTE — PROGRESS NOTES
Pharmacy Automatic Renal Dosing Protocol - Antimicrobials    Indication for Antimicrobials: Non-healing DM foot w/ LLE cellulitis & concern for OM     Current Regimen of Each Antimicrobial:  Vancomycin 1750 mg x 1 then 1000 mg Q16H (Start Date 10/31; Day # 2)  Zosyn 3.375gm IV q8h; start 10/31; Day #2    Previous Antimicrobial Therapy:  Ceftriaxone 2gm x1    Goal Vancomycin Level: 15-20: changed from 10-15 mcg/ml d/t OM concern    Significant Cultures:   10/31: Blood: (pending)  10/31: Wound: (pending)  10/31: XRay L Ft: Large lateral soft tissue ulceration with underlying erosive  changes concerning for acute osteomyelitis. Paralysis, amputations, malnutrition: Polio/Post Polio Syndrome, Cerebral Palsy,     Labs:  Recent Labs      17   0243  10/31/17   1044   CREA  0.44*  0.56   BUN  14  13   WBC  10.4  10.4     Temp (24hrs), Av.6 °F (37 °C), Min:98.3 °F (36.8 °C), Max:99 °F (37.2 °C)      Creatinine Clearance (mL/min) or Dialysis: 60    Impression/Plan:   - Xray w/ Concern for OM  - Trough Range increased to 15-20  - Change Vancomycin to 750mg IV q12h to achieve/maintain Trough Range 15-20  - Continue Zosyn 3.375gm IV q8h  - Daily BMP     Pharmacy will follow daily and adjust medications as appropriate for renal function and/or serum levels. Thank you,  Esequiel Ricci [facility]          Recommended duration of therapy  http://[facility]/Pharmacy/Clinical%20Companion/Duration%20of%20ABX%20therapy. docx    Renal Dosing  http://[facility]/[place]/[facility]/Pharmacy/Clinical%20Companion/Renal%[id number]. pdf

## 2017-11-01 NOTE — WOUND CARE
Wound Care Consult: chart reviewed and patient assessed for her left foot / ankle wound. Pt. Has been admitted for: Nonhealing diabetic foot ulcer with LLE cellulitis and concern for OM in setting of PAD and uncontrolled diabetes. Her wound has existed for 22 months and she has been cared for at the outpatient wound center - last in May of 2016. Pt. And her  did not like the wound care plan at that time and stopped going to the center. Most of the discussion was around the patient's non-compliance with her diabetes. Her current A1C is greater than 10 and has been way out of control for at least 2 years. About 1.5 years ago her A1C was 8.2 and then it has been out of control every since that time. She claims to have complied with a diabetic diet and she takes her medications as prescribed. Truthful discussion with patient and her : As discussed with the patient today, there are two major things that are keeping the wound from healing, excluding the possible osteo; That is, the out of control diabetes AND the smoking 1.5 packs per day of cigarettes. I explained that there are some people who smoke and never get lung problems but they may get arterial or vein problems, which include significantly decreased circulation and wounds that don't heal.    Assessment of the wound: The wound bed has visible pink, smooth, non-granulating tissue that is typical of diabetic wounds. There is about 15% slough on the edges and one area in the most posterior aspect of the wound. The constance-wound skin is erythematous and rolled and she has a lot of pain with any palpation or wound care.      WOUND POA CONDITIONS    Wound Ankle Left (Active)   DRESSING STATUS Removed 11/1/2017  3:03 PM   DRESSING TYPE Non-adherent 11/1/2017  3:03 PM   Non-Pressure Injury Full thickness (subcut/muscle) 11/1/2017  3:03 PM   Condition of [place];Slough 11/1/2017  3:03 PM   Condition of Edges Rolled/curled 11/1/2017  3:03 PM   Tissue Type Pink;Yellow 11/1/2017  3:03 PM   Tissue Type Percent Pink 85 11/1/2017  3:03 PM   Tissue Type Percent Yellow 15 11/1/2017  3:03 PM   Drainage Amount  Small  11/1/2017  3:03 PM   Drainage Color Serosanguinous; Yellow 11/1/2017  3:03 PM   Wound Odor None 11/1/2017  3:03 PM   Periwound Skin Condition Intact; Other (comment) 11/1/2017  3:03 PM   Cleansing and Cleansing Agents  Normal saline 11/1/2017  3:03 PM   Dressing Type Applied Xeroform; Foam 11/1/2017  3:03 PM   Procedure Tolerated Well 11/1/2017  3:03 PM   Number of days:1       Treatment today: Cleansed wound with sterile water and gauze. Applied Xeroform gauze and then a foam dressing over the wound. Orders written for enzymatic debridement with Santyl. Dr. Zainab Mcpherson agreed with the plan for one tube of the ointment. Orders: Cleanse the wound with sterile water or normal saline and wipe once across the wound bed firmly but slowly to remove the wound debris that loosens each day. Apply the Santyl ointment nickel thick to the entire wound bed and then apply a NS moistened piece of gauze. Cover with a foam dressing.     Katlyn Luna, RN, BSN, CWON ([id number])

## 2017-11-01 NOTE — PROGRESS NOTES
Surgery    Still with pain left foot wound. T max 99. Edema in lower legs decreased with elevation in bed. Dressing in place. Wound culture pending. For arterial doppler study today in vascular lab. I will discuss case with Podiatry regarding ability to salvage foot.     Aylin Del Rio MD

## 2017-11-01 NOTE — INTERDISCIPLINARY ROUNDS
Bedside interdisciplinary rounds were held today to discuss patient plan of care and outcomes. The following members were present: [address] [place] [address], Pharmacy, Physical Therapy, and Case Management. Plan:  Lovenox for prophylaxis. Zosyn and Vanc for broad ABX coverage. Test results pending. Per notes, Dr. Yobani Peck to include Podiatry.

## 2017-11-01 NOTE — PROCEDURES
[facility]  *** FINAL REPORT ***    Name: Aileen Regan  MRN: [id number]    Inpatient  : 24 Sep 1960  HIS Order #: [id number]  [id number] [facility] Visit #: [id number]  Date: 2017    TYPE OF TEST: Peripheral Arterial Testing    REASON FOR TEST  Extremity ulceration (left side)    Right Leg  Segmentals: Abnormal                     mmHg  Brachial         114  High thigh        49  Low thigh         61  Calf              35  Posterior tibial  50  Dorsalis pedis    45  Peroneal  Metatarsal  Toe pressure  Doppler:    Abnormal  Ankle/Brachial: 0.43    Site of occlusive disease:-  iliac (or above) segment    Left Leg  Segmentals: Abnormal                     mmHg  Brachial         115  High thigh        94  Low thigh        100  Calf              62  Posterior tibial  55  Dorsalis pedis    65  Peroneal  Metatarsal  Toe pressure  Doppler:    Abnormal  Ankle/Brachial: 0.57    Site of occlusive disease:-  iliac (or above) segment    INTERPRETATION/FINDINGS  PROCEDURE:  Multi-level lower extremity arterial segmental pressures,  CW Doppler waveforms and digital PPG waveforms were performed. 1. Severe peripheral arterial disease indicated at rest in the right  leg. 2. Absent continuous wave Doppler signal noted in the right dorsalis  pedis. 3. Disease is located in the iliac (or above) segment on the right  side. 4. Moderate peripheral arterial disease indicated at rest in the left  leg. 5. Abnormality of the left ankle continuous wave Doppler signals  noted. 6. Disease is located in the iliac (or above) segment on the left  side. 7. The right ankle/brachial index is 0.43 and the left ankle/brachial  index is 0.57.  8. The right great toe/brachial index is absent and the left great  toe/brachial index is absent. ADDITIONAL COMMENTS    I have personally reviewed the data relevant to the interpretation of  this  study. TECHNOLOGIST: Baron Hill.  ERIC Yarbrough, RDMS  Signed: 2017 10:20 AM    PHYSICIAN: Kacie Pope.  Anjali Wiley MD  Signed: 11/01/2017 12:28 PM

## 2017-11-01 NOTE — PROGRESS NOTES
[facility]  Preliminary Report:  PVR Lower Extremity    Pressure (mmHg) Right    Left    Brachial:  114   115  High Thigh:  49   94  Low Thigh:  61   100  Calf:   35   62  Ankle PTA:  50   55  Ankle DPA:  45   65  Great Toe:  0   0    Waveform:  Right   Left  CFA:   Biphasic  Biphasic  Popliteal:  Monophasic  Biphasic  PTA:   Monophasic  Monophasic  DPA:   Absent   Monophasic  Great Toe:  Absent   Reduced    Right DAY:   0.43  Left DAY 0.57  Right TBI: 0  Left TBI 0    Final report to follow.     CFA = Common Femoral Artery  PTA = Posterior Tibial Artery  DPA = Dorsalis Pedis Artery  DAY = Ankle Brachial Index  TBI = Toe Brachial Index

## 2017-11-01 NOTE — PROGRESS NOTES
Hospitalist Progress Note    NAME: Pili Fernández   :  1960   MRN:  [id number]       Assessment / Plan:  Nonhealing diabetic foot ulcer with LLE cellulitis and concern for OM in setting of PAD and uncontrolled diabetes  -XR with  \"Large lateral soft tissue ulceration with underlying erosive  changes concerning for acute osteomyelitis. \"  -Afebrile without leukocytosis. Lactic wnl.  -Received IV vanc and ceftriaxone in ED.  continue IV antibiotics vancomycin and zosyn. Follow cultures and surgery recs. Adjust as indicated  -Consult to wound care.   -Pain control.  -check ESR 21, CRP 0.69  -Appreciate Surgery consult with Dr. Quinton Arredondo, who has seen the patient outpatient. Further imaging and vascular studies per surgery based on planned intervention that the patient is agreeable to. Arterial doppler of LE with PAD     DM II, uncontrolled  -A1C 10.5 10/3/2017  -hold pta actos and metformin  -accuchecks with SSI  -consult to DTC education  -Follow insulin requirement will likely need long acting at time of DC.     HTN  -pta lisinopril     HLD  -pta statin     Hx of polio with residual BLE weakness  -pta requip     Tobacco use  -more than 1 PPD  -refuses nicotine patch        Code Status: full  Surrogate Decision Maker:   DVT Prophylaxis: lovenox  GI Prophylaxis: not indicated  Baseline: WC bound. Body mass index is 32.51 kg/(m^2). Subjective:     Chief Complaint / Reason for Physician Visit  \"foot pain\". Discussed with RN events overnight. Review of Systems:  Symptom Y/N Comments  Symptom Y/N Comments   Fever/Chills n   Chest Pain n    Poor Appetite    Edema     Cough n   Abdominal Pain n    Sputum    Joint Pain     SOB/DARLING n   Pruritis/Rash     Nausea/vomit n   Tolerating PT/OT     Diarrhea n   Tolerating Diet     Constipation    Other       Could NOT obtain due to:      Objective:     VITALS:   Last 24hrs VS reviewed since prior progress note.  Most recent are:  Patient Vitals for the past 24 hrs:   Temp Pulse Resp BP SpO2   11/01/17 1024 97.9 °F (36.6 °C) 80 18 130/62 100 %   11/01/17 0600 98.7 °F (37.1 °C) 79 18 129/62 96 %   11/01/17 0211 98.3 °F (36.8 °C) 77 18 124/58 95 %   10/31/17 2238 99 °F (37.2 °C) 94 18 109/79 97 %   10/31/17 1712 98.3 °F (36.8 °C) 93 18 140/81 97 %   10/31/17 1600 - 90 16 152/76 98 %   10/31/17 1500 - 87 16 (!) 151/127 98 %   10/31/17 1410 - 95 16 137/80 98 %   10/31/17 1300 - 98 16 117/79 96 %       Intake/Output Summary (Last 24 hours) at 11/01/17 1222  Last data filed at 11/01/17 0700   Gross per 24 hour   Intake              360 ml   Output             1000 ml   Net             -640 ml        PHYSICAL EXAM:  General: WD, WN. Alert, cooperative, no acute distress    EENT:  EOMI. Anicteric sclerae. MMM  Resp:  CTA bilaterally, no wheezing or rales. No accessory muscle use  CV:  Regular  rhythm,  No edema  GI:  Soft, Non distended, Non tender.  +Bowel sounds  Neurologic:  Alert and oriented X 3, normal speech, 4/5 weakness LE chronic  Psych:   Good insight. Not anxious nor agitated  Skin:  No rashes. No jaundice,  Erythema, warmth of LLE, dressing left foot     Reviewed most current lab test results and cultures  YES  Reviewed most current radiology test results   YES  Review and summation of old records today    NO  Reviewed patient's current orders and MAR    YES  PMH/SH reviewed - no change compared to H&P  ________________________________________________________________________  Care Plan discussed with:    Comments   Patient x    Family  x  in room   RN x    Care Manager     Consultant                        Multidiciplinary team rounds were held today with , nursing, pharmacist and clinical coordinator. Patient's plan of care was discussed; medications were reviewed and discharge planning was addressed.      ________________________________________________________________________  Total NON critical care TIME:  25   Minutes    Total CRITICAL CARE TIME Spent:   Minutes non procedure based      Comments   >50% of visit spent in counseling and coordination of care     ________________________________________________________________________  Judith Ricks MD     Procedures: see electronic medical records for all procedures/Xrays and details which were not copied into this note but were reviewed prior to creation of Plan. LABS:  I reviewed today's most current labs and imaging studies.   Pertinent labs include:  Recent Labs      11/01/17   0243  10/31/17   1044   WBC  10.4  10.4   HGB  12.0  12.6   HCT  36.2  38.2   PLT  303  316     Recent Labs      11/01/17 0243  10/31/17   1044   NA  141  141   K  3.5  3.4*   CL  108  108   CO2  25  26   GLU  178*  202*   BUN  14  13   CREA  0.44*  0.56   CA  8.5  8.7   ALB   --   3.8   TBILI   --   0.3   SGOT   --   34   ALT   --   59       Signed: Judith Ricks MD

## 2017-11-01 NOTE — PROGRESS NOTES
Spiritual Care Partner Volunteer visited patient in Ortho on November 1, 2017.   Documented by:  NISREEN Raymundo, [address],   CHAS KOVACS [facility] Paging Service  [phone]

## 2017-11-01 NOTE — DIABETES MGMT
DTC Consult Note    Recommendations/ Comments: If appropriate, please consider adding NPH  10 units every 12 hours before breakfast and at bedtime. Met with pt for consult and pt reported that she has been taking Toujeo 35 units bid when she is able to get samples from her PCP. Pt stated that depending on her BG value she will either give the Toujeo injection once daily, twice daily, or not at all if BG within normal limits. Discussed with pt how basal insulin works and importance of taking around same time each day. Pt also reported that she is unable to afford the Toujeo so she is not taking at all if she does not have samples. Pt stated she is willing to try NPH but only if her PCP is ok with her switching. Discussed with pt that she should f/u with PCP after d/c and discuss any med changes made. Also reviewed injection sites for insulin injection as pt typically giving injection is same spots on arm or abdomen. Provided education materials and DTC contact info. Consult received for:  [x]             Assessment of home management                []      Medication Recommendations                []             Meter/monitoring     []             Insulin instruction     []             New diagnosis     []             Outpatient education     []             Insulin pump patient     []             Insulin infusion     []             DKA/HHS    Chart reviewed and initial evaluation complete on [address] [place] [address]. Patient is a 59 y.o. female with known history of Type 2 Diabetes on Metformin 1,000mg bid, Actos 30mg daily, and Toujeo 35 units bid (takes when MD has samples available) at home.       Assessed and instructed patient on the following:   ·  interpretation of lab results, blood sugar goals, complications of diabetes mellitus, illness management, SMBG skills, nutrition, referred to Diabetes Educator, site rotation and use of insulin pen    Encouraged the following:   · Taking basal insulin on daily basis     Provided patient with the following: [x]             Survival skills education materials               [x]             Insulin education materials               []             CHO counting education materials               []             Outpatient DTC contact number               []             Glucometer                 Discussed with patient and/or family need for follow up appointment for diabetes management after discharge. A1c:   Lab Results   Component Value Date/Time    Hemoglobin A1c 10.5 10/03/2017 01:49 PM       Recent Glucose Results:   Lab Results   Component Value Date/Time     (H) 11/01/2017 02:43 AM    GLUCPOC 266 (H) 11/01/2017 12:13 PM    GLUCPOC 171 (H) 11/01/2017 07:37 AM    GLUCPOC 244 (H) 10/31/2017 08:44 PM        Lab Results   Component Value Date/Time    Creatinine 0.44 11/01/2017 02:43 AM     Estimated Creatinine Clearance: 114.5 mL/min (based on Cr of 0.44). Active Orders   Diet    DIET DIABETIC CONSISTENT CARB Regular        PO intake: No data found. Current hospital DM medication:   -Humalog normal sensitivity correction    Will continue to follow as needed. Thank you.     Antione Anderson, [address] [place] [address], [address]  Office: [phone]

## 2017-11-02 ENCOUNTER — ANESTHESIA (OUTPATIENT)
Dept: INTERVENTIONAL RADIOLOGY/VASCULAR | Age: 57
DRG: 638 | End: 2017-11-02
Payer: SELF-PAY

## 2017-11-02 ENCOUNTER — APPOINTMENT (OUTPATIENT)
Dept: INTERVENTIONAL RADIOLOGY/VASCULAR | Age: 57
DRG: 638 | End: 2017-11-02
Attending: SURGERY
Payer: SELF-PAY

## 2017-11-02 ENCOUNTER — ANESTHESIA EVENT (OUTPATIENT)
Dept: INTERVENTIONAL RADIOLOGY/VASCULAR | Age: 57
DRG: 638 | End: 2017-11-02
Payer: SELF-PAY

## 2017-11-02 LAB
ANION GAP SERPL CALC-SCNC: 8 MMOL/L (ref 5–15)
BACTERIA SPEC CULT: ABNORMAL
BUN SERPL-MCNC: 13 MG/DL (ref 6–20)
BUN/CREAT SERPL: 27 (ref 12–20)
CALCIUM SERPL-MCNC: 8.8 MG/DL (ref 8.5–10.1)
CHLORIDE SERPL-SCNC: 107 MMOL/L (ref 97–108)
CO2 SERPL-SCNC: 22 MMOL/L (ref 21–32)
CREAT SERPL-MCNC: 0.49 MG/DL (ref 0.55–1.02)
GLUCOSE BLD STRIP.AUTO-MCNC: 189 MG/DL (ref 65–100)
GLUCOSE BLD STRIP.AUTO-MCNC: 200 MG/DL (ref 65–100)
GLUCOSE BLD STRIP.AUTO-MCNC: 450 MG/DL (ref 65–100)
GLUCOSE SERPL-MCNC: 182 MG/DL (ref 65–100)
GRAM STN SPEC: ABNORMAL
GRAM STN SPEC: ABNORMAL
POTASSIUM SERPL-SCNC: 4.2 MMOL/L (ref 3.5–5.1)
SERVICE CMNT-IMP: ABNORMAL
SODIUM SERPL-SCNC: 137 MMOL/L (ref 136–145)

## 2017-11-02 PROCEDURE — 74011250636 HC RX REV CODE- 250/636

## 2017-11-02 PROCEDURE — 77030002996 HC SUT SLK J&J -A

## 2017-11-02 PROCEDURE — 74011000250 HC RX REV CODE- 250

## 2017-11-02 PROCEDURE — 76060000035 HC ANESTHESIA 2 TO 2.5 HR

## 2017-11-02 PROCEDURE — C1887 CATHETER, GUIDING: HCPCS

## 2017-11-02 PROCEDURE — 77030009378 HC DEV TORQ OLCT F/GWIRE MANIP COOK -A

## 2017-11-02 PROCEDURE — 36200 PLACE CATHETER IN AORTA: CPT

## 2017-11-02 PROCEDURE — 77030021533 HC CATH ANGI DX PRFMA MRTM -A

## 2017-11-02 PROCEDURE — 82962 GLUCOSE BLOOD TEST: CPT

## 2017-11-02 PROCEDURE — 76210000006 HC OR PH I REC 0.5 TO 1 HR

## 2017-11-02 PROCEDURE — C1769 GUIDE WIRE: HCPCS

## 2017-11-02 PROCEDURE — C1892 INTRO/SHEATH,FIXED,PEEL-AWAY: HCPCS

## 2017-11-02 PROCEDURE — 74011250636 HC RX REV CODE- 250/636: Performed by: INTERNAL MEDICINE

## 2017-11-02 PROCEDURE — 77030019908 HC STETH ESOPH SIMS -A: Performed by: ANESTHESIOLOGY

## 2017-11-02 PROCEDURE — 74011250637 HC RX REV CODE- 250/637: Performed by: INTERNAL MEDICINE

## 2017-11-02 PROCEDURE — 77030026438 HC STYL ET INTUB CARD -A: Performed by: ANESTHESIOLOGY

## 2017-11-02 PROCEDURE — B41D1ZZ FLUOROSCOPY OF AORTA AND BILATERAL LOWER EXTREMITY ARTERIES USING LOW OSMOLAR CONTRAST: ICD-10-PCS | Performed by: RADIOLOGY

## 2017-11-02 PROCEDURE — 74011000258 HC RX REV CODE- 258: Performed by: INTERNAL MEDICINE

## 2017-11-02 PROCEDURE — 77030008684 HC TU ET CUF COVD -B: Performed by: ANESTHESIOLOGY

## 2017-11-02 PROCEDURE — 74011636637 HC RX REV CODE- 636/637: Performed by: INTERNAL MEDICINE

## 2017-11-02 PROCEDURE — 77030010324 HC TBNG CNTRST MRTM -A

## 2017-11-02 PROCEDURE — 75630 X-RAY AORTA LEG ARTERIES: CPT

## 2017-11-02 PROCEDURE — 36415 COLL VENOUS BLD VENIPUNCTURE: CPT | Performed by: INTERNAL MEDICINE

## 2017-11-02 PROCEDURE — 65270000029 HC RM PRIVATE

## 2017-11-02 PROCEDURE — 74011250636 HC RX REV CODE- 250/636: Performed by: RADIOLOGY

## 2017-11-02 PROCEDURE — C1894 INTRO/SHEATH, NON-LASER: HCPCS

## 2017-11-02 PROCEDURE — 74011636320 HC RX REV CODE- 636/320: Performed by: RADIOLOGY

## 2017-11-02 PROCEDURE — 80048 BASIC METABOLIC PNL TOTAL CA: CPT | Performed by: INTERNAL MEDICINE

## 2017-11-02 RX ORDER — IODIXANOL 320 MG/ML
300 INJECTION, SOLUTION INTRAVASCULAR
Status: COMPLETED | OUTPATIENT
Start: 2017-11-02 | End: 2017-11-02

## 2017-11-02 RX ORDER — PROPOFOL 10 MG/ML
INJECTION, EMULSION INTRAVENOUS AS NEEDED
Status: DISCONTINUED | OUTPATIENT
Start: 2017-11-02 | End: 2017-11-02 | Stop reason: HOSPADM

## 2017-11-02 RX ORDER — GLYCOPYRROLATE 0.2 MG/ML
INJECTION INTRAMUSCULAR; INTRAVENOUS AS NEEDED
Status: DISCONTINUED | OUTPATIENT
Start: 2017-11-02 | End: 2017-11-02 | Stop reason: HOSPADM

## 2017-11-02 RX ORDER — ONDANSETRON 2 MG/ML
INJECTION INTRAMUSCULAR; INTRAVENOUS AS NEEDED
Status: DISCONTINUED | OUTPATIENT
Start: 2017-11-02 | End: 2017-11-02 | Stop reason: HOSPADM

## 2017-11-02 RX ORDER — INSULIN LISPRO 100 [IU]/ML
10 INJECTION, SOLUTION INTRAVENOUS; SUBCUTANEOUS ONCE
Status: COMPLETED | OUTPATIENT
Start: 2017-11-02 | End: 2017-11-02

## 2017-11-02 RX ORDER — LIDOCAINE HYDROCHLORIDE 20 MG/ML
20 INJECTION, SOLUTION EPIDURAL; INFILTRATION; INTRACAUDAL; PERINEURAL ONCE
Status: ACTIVE | OUTPATIENT
Start: 2017-11-02 | End: 2017-11-03

## 2017-11-02 RX ORDER — HEPARIN SODIUM 200 [USP'U]/100ML
800 INJECTION, SOLUTION INTRAVENOUS ONCE
Status: COMPLETED | OUTPATIENT
Start: 2017-11-02 | End: 2017-11-02

## 2017-11-02 RX ORDER — SODIUM CHLORIDE 0.9 % (FLUSH) 0.9 %
5-10 SYRINGE (ML) INJECTION AS NEEDED
Status: CANCELLED | OUTPATIENT
Start: 2017-11-02

## 2017-11-02 RX ORDER — MORPHINE SULFATE 10 MG/ML
2 INJECTION, SOLUTION INTRAMUSCULAR; INTRAVENOUS
Status: CANCELLED | OUTPATIENT
Start: 2017-11-02

## 2017-11-02 RX ORDER — HEPARIN SODIUM 1000 [USP'U]/ML
10000 INJECTION, SOLUTION INTRAVENOUS; SUBCUTANEOUS ONCE
Status: COMPLETED | OUTPATIENT
Start: 2017-11-02 | End: 2017-11-02

## 2017-11-02 RX ORDER — LIDOCAINE HYDROCHLORIDE 20 MG/ML
INJECTION, SOLUTION INFILTRATION; PERINEURAL
Status: COMPLETED
Start: 2017-11-02 | End: 2017-11-02

## 2017-11-02 RX ORDER — SODIUM CHLORIDE, SODIUM LACTATE, POTASSIUM CHLORIDE, CALCIUM CHLORIDE 600; 310; 30; 20 MG/100ML; MG/100ML; MG/100ML; MG/100ML
25 INJECTION, SOLUTION INTRAVENOUS CONTINUOUS
Status: CANCELLED | OUTPATIENT
Start: 2017-11-02 | End: 2017-11-03

## 2017-11-02 RX ORDER — LIDOCAINE HYDROCHLORIDE 10 MG/ML
0.1 INJECTION, SOLUTION EPIDURAL; INFILTRATION; INTRACAUDAL; PERINEURAL AS NEEDED
Status: CANCELLED | OUTPATIENT
Start: 2017-11-02

## 2017-11-02 RX ORDER — PHENYLEPHRINE HCL IN 0.9% NACL 0.4MG/10ML
SYRINGE (ML) INTRAVENOUS AS NEEDED
Status: DISCONTINUED | OUTPATIENT
Start: 2017-11-02 | End: 2017-11-02 | Stop reason: HOSPADM

## 2017-11-02 RX ORDER — DIPHENHYDRAMINE HYDROCHLORIDE 50 MG/ML
12.5 INJECTION, SOLUTION INTRAMUSCULAR; INTRAVENOUS
Status: CANCELLED | OUTPATIENT
Start: 2017-11-02

## 2017-11-02 RX ORDER — NEOSTIGMINE METHYLSULFATE 1 MG/ML
INJECTION INTRAVENOUS AS NEEDED
Status: DISCONTINUED | OUTPATIENT
Start: 2017-11-02 | End: 2017-11-02 | Stop reason: HOSPADM

## 2017-11-02 RX ORDER — FENTANYL CITRATE 50 UG/ML
25 INJECTION, SOLUTION INTRAMUSCULAR; INTRAVENOUS
Status: CANCELLED | OUTPATIENT
Start: 2017-11-02

## 2017-11-02 RX ORDER — SODIUM CHLORIDE 9 MG/ML
INJECTION, SOLUTION INTRAVENOUS
Status: DISCONTINUED | OUTPATIENT
Start: 2017-11-02 | End: 2017-11-02 | Stop reason: HOSPADM

## 2017-11-02 RX ORDER — HYDROMORPHONE HYDROCHLORIDE 1 MG/ML
.2-.5 INJECTION, SOLUTION INTRAMUSCULAR; INTRAVENOUS; SUBCUTANEOUS
Status: CANCELLED | OUTPATIENT
Start: 2017-11-02

## 2017-11-02 RX ORDER — SODIUM CHLORIDE, SODIUM LACTATE, POTASSIUM CHLORIDE, CALCIUM CHLORIDE 600; 310; 30; 20 MG/100ML; MG/100ML; MG/100ML; MG/100ML
25 INJECTION, SOLUTION INTRAVENOUS CONTINUOUS
Status: CANCELLED | OUTPATIENT
Start: 2017-11-02

## 2017-11-02 RX ORDER — DEXAMETHASONE SODIUM PHOSPHATE 4 MG/ML
INJECTION, SOLUTION INTRA-ARTICULAR; INTRALESIONAL; INTRAMUSCULAR; INTRAVENOUS; SOFT TISSUE AS NEEDED
Status: DISCONTINUED | OUTPATIENT
Start: 2017-11-02 | End: 2017-11-02 | Stop reason: HOSPADM

## 2017-11-02 RX ORDER — LIDOCAINE HYDROCHLORIDE 20 MG/ML
INJECTION, SOLUTION EPIDURAL; INFILTRATION; INTRACAUDAL; PERINEURAL AS NEEDED
Status: DISCONTINUED | OUTPATIENT
Start: 2017-11-02 | End: 2017-11-02 | Stop reason: HOSPADM

## 2017-11-02 RX ORDER — ROCURONIUM BROMIDE 10 MG/ML
INJECTION, SOLUTION INTRAVENOUS AS NEEDED
Status: DISCONTINUED | OUTPATIENT
Start: 2017-11-02 | End: 2017-11-02 | Stop reason: HOSPADM

## 2017-11-02 RX ORDER — FENTANYL CITRATE 50 UG/ML
INJECTION, SOLUTION INTRAMUSCULAR; INTRAVENOUS AS NEEDED
Status: DISCONTINUED | OUTPATIENT
Start: 2017-11-02 | End: 2017-11-02 | Stop reason: HOSPADM

## 2017-11-02 RX ORDER — SODIUM CHLORIDE 0.9 % (FLUSH) 0.9 %
5-10 SYRINGE (ML) INJECTION EVERY 8 HOURS
Status: CANCELLED | OUTPATIENT
Start: 2017-11-02

## 2017-11-02 RX ORDER — ACETAMINOPHEN 10 MG/ML
INJECTION, SOLUTION INTRAVENOUS AS NEEDED
Status: DISCONTINUED | OUTPATIENT
Start: 2017-11-02 | End: 2017-11-02 | Stop reason: HOSPADM

## 2017-11-02 RX ADMIN — Medication 10 ML: at 21:52

## 2017-11-02 RX ADMIN — IODIXANOL 157 ML: 320 INJECTION, SOLUTION INTRAVASCULAR at 12:40

## 2017-11-02 RX ADMIN — Medication 120 MCG: at 14:00

## 2017-11-02 RX ADMIN — OXYCODONE HYDROCHLORIDE AND ACETAMINOPHEN 1 TABLET: 5; 325 TABLET ORAL at 02:29

## 2017-11-02 RX ADMIN — FENTANYL CITRATE 100 MCG: 50 INJECTION, SOLUTION INTRAMUSCULAR; INTRAVENOUS at 13:13

## 2017-11-02 RX ADMIN — ROPINIROLE HYDROCHLORIDE 0.5 MG: 0.25 TABLET, FILM COATED ORAL at 21:42

## 2017-11-02 RX ADMIN — VANCOMYCIN HYDROCHLORIDE 750 MG: 750 INJECTION, POWDER, LYOPHILIZED, FOR SOLUTION INTRAVENOUS at 21:50

## 2017-11-02 RX ADMIN — INSULIN LISPRO 10 UNITS: 100 INJECTION, SOLUTION INTRAVENOUS; SUBCUTANEOUS at 21:46

## 2017-11-02 RX ADMIN — PIPERACILLIN SODIUM,TAZOBACTAM SODIUM 3.38 G: 3; .375 INJECTION, POWDER, FOR SOLUTION INTRAVENOUS at 17:20

## 2017-11-02 RX ADMIN — Medication 120 MCG: at 13:47

## 2017-11-02 RX ADMIN — NEOSTIGMINE METHYLSULFATE 3 MG: 1 INJECTION INTRAVENOUS at 14:35

## 2017-11-02 RX ADMIN — ONDANSETRON 4 MG: 2 INJECTION INTRAMUSCULAR; INTRAVENOUS at 13:28

## 2017-11-02 RX ADMIN — PROPOFOL 100 MG: 10 INJECTION, EMULSION INTRAVENOUS at 13:13

## 2017-11-02 RX ADMIN — Medication 120 MCG: at 13:52

## 2017-11-02 RX ADMIN — OXYBUTYNIN CHLORIDE 15 MG: 5 TABLET, FILM COATED, EXTENDED RELEASE ORAL at 17:19

## 2017-11-02 RX ADMIN — IBUPROFEN 600 MG: 600 TABLET, FILM COATED ORAL at 21:41

## 2017-11-02 RX ADMIN — IBUPROFEN 600 MG: 600 TABLET, FILM COATED ORAL at 02:29

## 2017-11-02 RX ADMIN — HEPARIN SODIUM IN SODIUM CHLORIDE 1600 UNITS: 200 INJECTION INTRAVENOUS at 12:40

## 2017-11-02 RX ADMIN — INSULIN LISPRO 2 UNITS: 100 INJECTION, SOLUTION INTRAVENOUS; SUBCUTANEOUS at 16:53

## 2017-11-02 RX ADMIN — OXYCODONE HYDROCHLORIDE AND ACETAMINOPHEN 1 TABLET: 5; 325 TABLET ORAL at 19:34

## 2017-11-02 RX ADMIN — Medication 80 MCG: at 13:20

## 2017-11-02 RX ADMIN — Medication 10 ML: at 06:50

## 2017-11-02 RX ADMIN — Medication 120 MCG: at 14:49

## 2017-11-02 RX ADMIN — LIDOCAINE HYDROCHLORIDE 100 MG: 20 INJECTION, SOLUTION EPIDURAL; INFILTRATION; INTRACAUDAL; PERINEURAL at 13:13

## 2017-11-02 RX ADMIN — Medication 120 MCG: at 14:30

## 2017-11-02 RX ADMIN — Medication 120 MCG: at 14:23

## 2017-11-02 RX ADMIN — PIPERACILLIN SODIUM,TAZOBACTAM SODIUM 3.38 G: 3; .375 INJECTION, POWDER, FOR SOLUTION INTRAVENOUS at 02:31

## 2017-11-02 RX ADMIN — SODIUM CHLORIDE: 9 INJECTION, SOLUTION INTRAVENOUS at 13:03

## 2017-11-02 RX ADMIN — HEPARIN SODIUM 4000 UNITS: 1000 INJECTION, SOLUTION INTRAVENOUS; SUBCUTANEOUS at 13:00

## 2017-11-02 RX ADMIN — LIDOCAINE HYDROCHLORIDE 200 MG: 20 INJECTION, SOLUTION INFILTRATION; PERINEURAL at 12:40

## 2017-11-02 RX ADMIN — Medication 120 MCG: at 14:04

## 2017-11-02 RX ADMIN — LISINOPRIL 20 MG: 20 TABLET ORAL at 21:42

## 2017-11-02 RX ADMIN — ENOXAPARIN SODIUM 40 MG: 40 INJECTION SUBCUTANEOUS at 17:21

## 2017-11-02 RX ADMIN — DEXAMETHASONE SODIUM PHOSPHATE 8 MG: 4 INJECTION, SOLUTION INTRA-ARTICULAR; INTRALESIONAL; INTRAMUSCULAR; INTRAVENOUS; SOFT TISSUE at 13:33

## 2017-11-02 RX ADMIN — GLYCOPYRROLATE 0.4 MG: 0.2 INJECTION INTRAMUSCULAR; INTRAVENOUS at 14:35

## 2017-11-02 RX ADMIN — ROCURONIUM BROMIDE 50 MG: 10 INJECTION, SOLUTION INTRAVENOUS at 13:14

## 2017-11-02 RX ADMIN — OXYCODONE HYDROCHLORIDE AND ACETAMINOPHEN 1 TABLET: 5; 325 TABLET ORAL at 08:47

## 2017-11-02 RX ADMIN — ACETAMINOPHEN 1000 MG: 10 INJECTION, SOLUTION INTRAVENOUS at 13:46

## 2017-11-02 RX ADMIN — PROPOFOL 25 MG: 10 INJECTION, EMULSION INTRAVENOUS at 13:14

## 2017-11-02 NOTE — DIABETES MGMT
DTC Progress Note    Recommendations/ Comments: If appropriate, please consider adding NPH 10 units every 12 hours to aid in glucose control. Chart reviewed on Pili Fernández. Patient is a 59 y.o. female with known history of Type 2 Diabetes on Metformin 1,000mg bid, Actos 30mg daily, and Toujeo 35 units bid (takes when MD has samples available) at home. A1c:   Lab Results   Component Value Date/Time    Hemoglobin A1c 10.5 10/03/2017 01:49 PM    Hemoglobin A1c 10.3 07/05/2017 04:29 PM       Recent Glucose Results:   Lab Results   Component Value Date/Time     (H) 11/02/2017 04:28 AM    GLUCPOC 200 (H) 11/02/2017 07:39 AM    GLUCPOC 282 (H) 11/01/2017 08:55 PM    GLUCPOC 222 (H) 11/01/2017 05:13 PM        Lab Results   Component Value Date/Time    Creatinine 0.49 11/02/2017 04:28 AM     Estimated Creatinine Clearance: 102.8 mL/min (based on Cr of 0.49). Active Orders   Diet    DIET NPO        PO intake: No data found. Current hospital DM medication:   -humalog normal sensitivity correction    Will continue to follow as needed.     Thank you    Lydia Rojas, [address] [place] [address], [address]  Office: [phone]

## 2017-11-02 NOTE — PERIOP NOTES
TRANSFER - OUT REPORT:    Verbal report given to DEAN RN(name) on Baldemar  being transferred to [place] [id number](unit) for routine post - op       Report consisted of patients Situation, Background, Assessment and   Recommendations(SBAR). Information from the following report(s) SBAR, Kardex, OR Summary, Intake/Output, MAR and Recent Results was reviewed with the receiving nurse. Opportunity for questions and clarification was provided.       Patient transported with:   [employer]

## 2017-11-02 NOTE — PROGRESS NOTES
Bedside shift change report given to [address] (oncoming nurse) by Jose Segura RN (offgoing nurse). Report included the following information SBAR, Kardex, ED Summary, Intake/Output, MAR and Recent Results.

## 2017-11-02 NOTE — ANESTHESIA POSTPROCEDURE EVALUATION
Post-Anesthesia Evaluation and Assessment    Patient: Pablito Alexandre MRN: [id number]  SSN: [id number]    YOB: 1960  Age: 59 y.o. Sex: female       Cardiovascular Function/Vital Signs  Visit Vitals    /59    Pulse 77    Temp 36.7 °C (98.1 °F)    Resp 19    Wt 65.8 kg (145 lb)    SpO2 98%    BMI 32.51 kg/m2       Patient is status post general anesthesia for * No procedures listed *. Nausea/Vomiting: None    Postoperative hydration reviewed and adequate. Pain:  Pain Scale 1: Numeric (0 - 10) (11/02/17 1540)  Pain Intensity 1: 0 (11/02/17 1540)   Managed    Neurological Status:   Neuro (WDL): Exceptions to WDL (11/02/17 1509)  Neuro  Neurologic State: Drowsy (11/02/17 1509)  Orientation Level: Oriented to person (11/02/17 1509)  Cognition: Follows commands (11/02/17 1509)  LLE Motor Response: Rigid;Numbness (11/02/17 1509)  RLE Motor Response: Rigid;Numbness (11/02/17 1509)   At baseline    Mental Status and Level of Consciousness: Arousable    Pulmonary Status:   O2 Device: Room air (11/02/17 1545)   Adequate oxygenation and airway patent    Complications related to anesthesia: None    Post-anesthesia assessment completed.  No concerns    Signed By: Ruperto Renae MD     November 2, 2017

## 2017-11-02 NOTE — PROGRESS NOTES
Hospitalist Progress Note    NAME: Roshan Leiva   :  1960   MRN:  [id number]       Assessment / Plan:  Nonhealing diabetic foot ulcer with LLE cellulitis and concern for OM in setting of PAD and uncontrolled diabetes  -XR with  \"Large lateral soft tissue ulceration with underlying erosive  changes concerning for acute osteomyelitis. \"  -Afebrile without leukocytosis. Lactic wnl.  -Received IV vanc and ceftriaxone in ED.  continue IV antibiotics vancomycin and zosyn. Follow cultures and surgery recs. Adjust as indicated  -Consult to wound care.   -Pain control.  -check ESR 21, CRP 0.69  -Appreciate Surgery consult with Dr. Bailey Dominguez, who has seen the patient outpatient. Arterial doppler of LE with PAD, for angiogram      DM II, uncontrolled  -A1C 10.5 10/3/2017  -hold pta actos and metformin  -accuchecks with SSI  -consult to DTC education  -Follow insulin requirement will likely need long acting at time of DC.     HTN  -pta lisinopril     HLD  -pta statin     Hx of polio with residual BLE weakness  -pta requip     Tobacco use  -more than 1 PPD  -refuses nicotine patch        Code Status: full  Surrogate Decision Maker:   DVT Prophylaxis: lovenox  GI Prophylaxis: not indicated  Baseline: WC bound. Body mass index is 32.51 kg/(m^2). Subjective:     Chief Complaint / Reason for Physician Visit  \"foot pain\". Discussed with RN events overnight. Review of Systems:  Symptom Y/N Comments  Symptom Y/N Comments   Fever/Chills n   Chest Pain n    Poor Appetite    Edema     Cough n   Abdominal Pain n    Sputum    Joint Pain     SOB/DARLING n   Pruritis/Rash     Nausea/vomit n   Tolerating PT/OT     Diarrhea n   Tolerating Diet     Constipation    Other       Could NOT obtain due to:      Objective:     VITALS:   Last 24hrs VS reviewed since prior progress note.  Most recent are:  Patient Vitals for the past 24 hrs:   Temp Pulse Resp BP SpO2   17 1115 98 °F (36.7 °C) 76 18 154/69 98 % 11/02/17 0600 98 °F (36.7 °C) 76 16 152/71 98 %   11/02/17 0227 97.7 °F (36.5 °C) 79 18 133/65 97 %   11/01/17 2058 97.9 °F (36.6 °C) 83 18 126/65 100 %   11/01/17 1752 98.2 °F (36.8 °C) 85 18 162/75 100 %   11/01/17 1458 97.8 °F (36.6 °C) 88 18 144/73 98 %       Intake/Output Summary (Last 24 hours) at 11/02/17 1300  Last data filed at 11/02/17 3247   Gross per 24 hour   Intake             1200 ml   Output             1300 ml   Net             -100 ml        PHYSICAL EXAM:  General: WD, WN. Alert, cooperative, no acute distress    EENT:  EOMI. Anicteric sclerae. MMM  Resp:  CTA bilaterally, no wheezing or rales. No accessory muscle use  CV:  Regular  rhythm,  No edema  GI:  Soft, Non distended, Non tender.  +Bowel sounds  Neurologic:  Alert and oriented X 3, normal speech, 4/5 weakness LE chronic  Psych:   Good insight. Not anxious nor agitated  Skin:  No rashes. No jaundice,  Erythema, warmth of LLE, dressing left foot     Reviewed most current lab test results and cultures  YES  Reviewed most current radiology test results   YES  Review and summation of old records today    NO  Reviewed patient's current orders and MAR    YES  PMH/ reviewed - no change compared to H&P  ________________________________________________________________________  Care Plan discussed with:    Comments   Patient x    Family  x  in room   RN x    Care Manager     Consultant                        Multidiciplinary team rounds were held today with , nursing, pharmacist and clinical coordinator. Patient's plan of care was discussed; medications were reviewed and discharge planning was addressed.      ________________________________________________________________________  Total NON critical care TIME:  25   Minutes    Total CRITICAL CARE TIME Spent:   Minutes non procedure based      Comments   >50% of visit spent in counseling and coordination of care ________________________________________________________________________  Marcelino Roberts MD     Procedures: see electronic medical records for all procedures/Xrays and details which were not copied into this note but were reviewed prior to creation of Plan. LABS:  I reviewed today's most current labs and imaging studies.   Pertinent labs include:  Recent Labs      11/01/17   0243  10/31/17   1044   WBC  10.4  10.4   HGB  12.0  12.6   HCT  36.2  38.2   PLT  303  316     Recent Labs      11/02/17   0428  11/01/17   0243  10/31/17   1044   NA  137  141  141   K  4.2  3.5  3.4*   CL  107  108  108   CO2  22  25  26   GLU  182*  178*  202*   BUN  13  14  13   CREA  0.49*  0.44*  0.56   CA  8.8  8.5  8.7   ALB   --    --   3.8   TBILI   --    --   0.3   SGOT   --    --   34   ALT   --    --   59       Signed: Marcelino Roberts MD

## 2017-11-02 NOTE — PROGRESS NOTES
Pt arrived in xray recovery for procedure. MD has discussed procedure with pt. Consent signed and on chart.

## 2017-11-02 NOTE — PROGRESS NOTES
Problem: Falls - Risk of  Goal: *Absence of Falls  Document Parveen Fall Risk and appropriate interventions in the flowsheet.    Outcome: Progressing Towards Goal  Fall Risk Interventions:  Mobility Interventions: Communicate number of staff needed for ambulation/transfer, OT consult for ADLs, PT Consult for mobility concerns, Utilize walker, cane, or other assitive device         Medication Interventions: Patient to call before getting OOB, Teach patient to arise slowly, Utilize gait belt for transfers/ambulation    Elimination Interventions: Call light in reach, Patient to call for help with toileting needs, Toileting schedule/hourly rounds

## 2017-11-02 NOTE — PERIOP NOTES
Handoff Report from Operating Room to PACU    Report received from [facility], RN and ELODIA Marshall CRNA regarding Marita Ramon. * No surgeons listed *  And * No procedures listed *  confirmed   with allergies and dressings discussed. Anesthesia type, drugs, patient history, complications, estimated blood loss, vital signs, intake and output, and last pain medication, lines and temperature were reviewed.

## 2017-11-02 NOTE — ANESTHESIA PREPROCEDURE EVALUATION
Anesthetic History   No history of anesthetic complications            Review of Systems / Medical History  Patient summary reviewed, nursing notes reviewed and pertinent labs reviewed    Pulmonary          Smoker      Comments: 62 pack year    Neuro/Psych       CVA  TIA and headaches     Cardiovascular    Hypertension: well controlled          PAD and hyperlipidemia    Exercise tolerance: <4 METS  Comments:   Mitral regurgitation     GI/Hepatic/Renal     GERD: well controlled           Endo/Other    Diabetes: poorly controlled, type 2, using insulin    Obesity and arthritis     Other Findings   Comments:  Muscle spasm , Spastic paralysis ,  Cerebral palsy , Polio    Post-polio syndrome     Crohn disease    Skin disease, bullous          Physical Exam    Airway  Mallampati: II  TM Distance: 4 - 6 cm  Neck ROM: decreased range of motion   Mouth opening: Normal     Cardiovascular    Rhythm: regular  Rate: normal    Murmur, Mitral area     Dental    Dentition: Loose teeth and Poor dentition     Pulmonary      Decreased breath sounds: bibasilar           Abdominal  GI exam deferred       Other Findings            Anesthetic Plan    ASA: 4  Anesthesia type: general    Monitoring Plan: BIS      Induction: Intravenous  Anesthetic plan and risks discussed with: Patient and Spouse

## 2017-11-02 NOTE — PROCEDURES
PROCEDURE:Aortogram with runoff via right transbrachial approach. INDICATION:nonhealing ulcer, left foot. ANESTHESIA:general and local.  COMPLICATION:NONE. SPECIMENS REMOVED:none. BLOOD LOSS:NONE. /ASSISTANT:KAREN Schmitt RECOMMENDATIONS:none. CONSENT OBTAINED:YES.  NOTES:phone report to Dr. Yobani Peck.

## 2017-11-02 NOTE — PROGRESS NOTES
Surgery    Angio shows aortoiliac disease not readily amenable to angiogplasty.   I will review images with interventional radiologist in AM.    Larissa Otoole MD

## 2017-11-02 NOTE — PROGRESS NOTES
Bedside and Verbal shift change report given to [address] (oncoming nurse) by Kd Espitia RN (offgoing nurse). Report included the following information SBAR, Kardex, Procedure Summary, Intake/Output, MAR and Recent Results.

## 2017-11-02 NOTE — INTERDISCIPLINARY ROUNDS
Bedside interdisciplinary rounds were held today to discuss patient plan of care and outcomes. The following members were present: Physician, Nurse, Clinical Care Leader, Pharmacy, Physical Therapy, and Case Management.     Plan:  Patient TATIANNA for angiogram.

## 2017-11-02 NOTE — H&P
Radiology History and Physical    Patient: Jasiel Adjutant 59 y.o. female     Chief Complaint:   Chief Complaint   Patient presents with    Referral / Consult     patient states that she was advised to come to ED for admission of a wound to left foot       History of Present Illness: Nonhealing ulcer, left foot. History:    Past Medical History:   Diagnosis Date    Acid indigestion     or heartburn    Arthritis     scince childhood    Cerebral palsy ([address]     Crohn disease ([address]     Diabetes ([address]     Financial difficulties 10/4/2012    GERD (gastroesophageal reflux disease)     Hemorrhoids     Hernia of unspecified site of abdominal cavity without mention of obstruction or gangrene     Hypertension     Mitral regurgitation 10/5/2011    Muscle spasm 6/19/2014    chronic    PAD (peripheral artery disease) ([address] 6/19/2014    Polio     Primary osteoarthritis of both hips 8/9/2012    Skin disease, bullous     frequent    Spastic paralysis ([address]     Stiff joint     Stroke ([address]     Tobacco abuse 8/10/2012    Urine incontinence     controlling    Weakness     of an arm or leg     Family History   Problem Relation Age of Onset    Diabetes Mother     Alcohol abuse Mother     Heart Disease Mother     Hypertension Mother     Cancer Father     Alcohol abuse Father     Diabetes Maternal Grandmother     Stroke Maternal Grandmother      Social History     Social History    Marital status:      Spouse name: N/A    Number of children: N/A    Years of education: N/A     Occupational History    Not on file. Social History Main Topics    Smoking status: Current Every Day Smoker     Packs/day: 1.50     Years: 38.00    Smokeless tobacco: Former User    Alcohol use No    Drug use: No    Sexual activity: Yes     Partners: Male     Other Topics Concern    Not on file     Social History Narrative       Allergies:    Allergies   Allergen Reactions    Potassium Other (comments) Patient states she will refuse IV Potassium due to irritation-Patient will accept PO form.     Anaprox [Naproxen Sodium] Other (comments)     Blood pressure drops  Tolerates ibuprofen    Codeine Nausea and Vomiting     Tolerates oxycodone/acetaminophen    Mobic [Meloxicam] Other (comments)     Blood pressure drops  Tolerates ibuprofen    Tylenol [Acetaminophen] Other (comments)     \"Stomach hurts\", hx of Crohn's disease        Current Medications:  Current Facility-Administered Medications   Medication Dose Route Frequency    vancomycin (VANCOCIN) 750 mg in 0.9% sodium chloride (MBP/ADV) 250 mL  750 mg IntraVENous Q12H    lidocaine (PF) (XYLOCAINE) 20 mg/mL (2 %) injection 400 mg  20 mL SubCUTAneous ONCE    collagenase (SANTYL) 250 unit/gram ointment   Topical DAILY    aspirin delayed-release tablet 81 mg  81 mg Oral DAILY    ibuprofen (MOTRIN) tablet 600 mg  600 mg Oral Q6H PRN    lisinopril (PRINIVIL, ZESTRIL) tablet 20 mg  20 mg Oral QHS    oxybutynin chloride XL (DITROPAN XL) tablet 15 mg  15 mg Oral QPM    oxyCODONE-acetaminophen (PERCOCET) 5-325 mg per tablet 1 Tab  1 Tab Oral Q4H PRN    pravastatin (PRAVACHOL) tablet 40 mg  40 mg Oral DAILY    rOPINIRole (REQUIP) tablet 0.5 mg  0.5 mg Oral QHS    sodium chloride (NS) flush 5-10 mL  5-10 mL IntraVENous Q8H    sodium chloride (NS) flush 5-10 mL  5-10 mL IntraVENous PRN    acetaminophen (TYLENOL) tablet 650 mg  650 mg Oral Q6H PRN    ondansetron (ZOFRAN) injection 4 mg  4 mg IntraVENous Q4H PRN    piperacillin-tazobactam (ZOSYN) 3.375 g in 0.9% sodium chloride (MBP/ADV) 100 mL  3.375 g IntraVENous Q8H    enoxaparin (LOVENOX) injection 40 mg  40 mg SubCUTAneous Q24H    polyethylene glycol (MIRALAX) packet 17 g  17 g Oral DAILY    senna-docusate (PERICOLACE) 8.6-50 mg per tablet 1 Tab  1 Tab Oral DAILY PRN    hydrALAZINE (APRESOLINE) 20 mg/mL injection 10 mg  10 mg IntraVENous Q6H PRN    insulin lispro (HUMALOG) injection   SubCUTAneous AC&HS    glucose chewable tablet 16 g  4 Tab Oral PRN    dextrose (D50W) injection syrg 12.5-25 g  12.5-25 g IntraVENous PRN    glucagon (GLUCAGEN) injection 1 mg  1 mg IntraMUSCular PRN     Facility-Administered Medications Ordered in Other Encounters   Medication Dose Route Frequency    lidocaine (PF) (XYLOCAINE) 20 mg/mL (2 %) injection   IntraVENous PRN    propofol (DIPRIVAN) 10 mg/mL injection   IntraVENous PRN    rocuronium (ZEMURON) injection   IntraVENous PRN    fentaNYL citrate (PF) injection   IntraVENous PRN    ondansetron (ZOFRAN) injection   IntraVENous PRN    0.9% sodium chloride infusion   IntraVENous CONTINUOUS    PHENYLephrine (NEOSYNEPHRINE) in NS syringe   IntraVENous PRN    dexamethasone (DECADRON) 4 mg/mL injection   IntraVENous PRN    acetaminophen (OFIRMEV) infusion   IntraVENous PRN    neostigmine (PROSTIGMINE) injection   IntraVENous PRN    glycopyrrolate (ROBINUL) injection   IntraVENous PRN        Physical Exam:  Blood pressure 154/69, pulse 76, temperature 98 °F (36.7 °C), resp. rate 18, weight 65.8 kg (145 lb), SpO2 98 %. GENERAL: alert, cooperative, no distress, appears stated age, LUNG: clear to auscultation bilaterally, HEART: regular rate and rhythm      Alerts:    Hospital Problems  Date Reviewed: 11/1/2017          Codes Class Noted POA    Open wound of left foot ICD-10-CM: S91.302A  ICD-9-CM: 892.0  11/1/2017 Yes        Cellulitis ICD-10-CM: L03.90  ICD-9-CM: 682.9  10/31/2017 Yes              Laboratory:      Recent Labs      11/02/17   0428  11/01/17   0243   HGB   --   12.0   HCT   --   36.2   WBC   --   10.4   PLT   --   303   BUN  13  14   CREA  0.49*  0.44*   K  4.2  3.5         Plan of Care/Planned Procedure:  Risks, benefits, and alternatives reviewed with patient and she agrees to proceed with the procedure.

## 2017-11-02 NOTE — PROGRESS NOTES
Name of procedure: Aortagram with runoff and possible intervention    Complications, if any, r/t procedure: none    Sedation medications given: General per anesthesia    VS : Stable    Post Procedure Care Needed/order sets in [facility]: see orders    Pt tolerated procedure well. VSS. No C/O pain. Dressing ot site D&I. No bleeding or hematoma noted to site. Pt taken to PACU with CRNA. Pt placed in monitor. Bedside report given to PACU RN and RN will assume care of pt. NAD noted at time of transfer,  Updates called to floor RN. TRANSFER - OUT REPORT:    Verbal report given to Huy Carpenter RN (unit RN) and Pablito Charles RN (PACU RN) on [facility]  being transferred to PACU then back to room for routine post - op       Report consisted of patients Situation, Background, Assessment and   Recommendations(SBAR). Information from the following report(s) SBAR, Kardex, Procedure Summary, Intake/Output and MAR was reviewed with the receiving nurse. Lines:   Peripheral IV 10/31/17 Right Forearm (Active)   Site Assessment Clean, dry, & intact 11/2/2017  3:16 AM   Phlebitis Assessment 0 11/2/2017  3:16 AM   Infiltration Assessment 0 11/2/2017  3:16 AM   Dressing Status Clean, dry, & intact 11/2/2017  3:16 AM   Dressing Type Tape;Transparent 11/2/2017  3:16 AM   Hub Color/Line Status Pink;Patent; Flushed;Capped 11/2/2017  6:51 AM        Opportunity for questions and clarification was provided.       Patient transported with:   Monitor  O2 @ 4 liters  Registered Nurse   CRNA

## 2017-11-03 LAB
ANION GAP SERPL CALC-SCNC: 10 MMOL/L (ref 5–15)
BUN SERPL-MCNC: 14 MG/DL (ref 6–20)
BUN/CREAT SERPL: 22 (ref 12–20)
CALCIUM SERPL-MCNC: 8.9 MG/DL (ref 8.5–10.1)
CHLORIDE SERPL-SCNC: 103 MMOL/L (ref 97–108)
CO2 SERPL-SCNC: 22 MMOL/L (ref 21–32)
CREAT SERPL-MCNC: 0.64 MG/DL (ref 0.55–1.02)
ERYTHROCYTE [DISTWIDTH] IN BLOOD BY AUTOMATED COUNT: 14 % (ref 11.5–14.5)
GLUCOSE BLD STRIP.AUTO-MCNC: 269 MG/DL (ref 65–100)
GLUCOSE BLD STRIP.AUTO-MCNC: 278 MG/DL (ref 65–100)
GLUCOSE BLD STRIP.AUTO-MCNC: 288 MG/DL (ref 65–100)
GLUCOSE BLD STRIP.AUTO-MCNC: 341 MG/DL (ref 65–100)
GLUCOSE BLD STRIP.AUTO-MCNC: 387 MG/DL (ref 65–100)
GLUCOSE SERPL-MCNC: 311 MG/DL (ref 65–100)
HCT VFR BLD AUTO: 31.9 % (ref 35–47)
HGB BLD-MCNC: 10.6 G/DL (ref 11.5–16)
MCH RBC QN AUTO: 29.1 PG (ref 26–34)
MCHC RBC AUTO-ENTMCNC: 33.2 G/DL (ref 30–36.5)
MCV RBC AUTO: 87.6 FL (ref 80–99)
PLATELET # BLD AUTO: 290 K/UL (ref 150–400)
POTASSIUM SERPL-SCNC: 3.9 MMOL/L (ref 3.5–5.1)
RBC # BLD AUTO: 3.64 M/UL (ref 3.8–5.2)
SERVICE CMNT-IMP: ABNORMAL
SODIUM SERPL-SCNC: 135 MMOL/L (ref 136–145)
WBC # BLD AUTO: 13.5 K/UL (ref 3.6–11)

## 2017-11-03 PROCEDURE — 82962 GLUCOSE BLOOD TEST: CPT

## 2017-11-03 PROCEDURE — 74011000258 HC RX REV CODE- 258: Performed by: INTERNAL MEDICINE

## 2017-11-03 PROCEDURE — 65270000029 HC RM PRIVATE

## 2017-11-03 PROCEDURE — 80048 BASIC METABOLIC PNL TOTAL CA: CPT | Performed by: INTERNAL MEDICINE

## 2017-11-03 PROCEDURE — 74011250636 HC RX REV CODE- 250/636: Performed by: INTERNAL MEDICINE

## 2017-11-03 PROCEDURE — 74011250637 HC RX REV CODE- 250/637: Performed by: SURGERY

## 2017-11-03 PROCEDURE — 74011636637 HC RX REV CODE- 636/637: Performed by: INTERNAL MEDICINE

## 2017-11-03 PROCEDURE — 74011250637 HC RX REV CODE- 250/637: Performed by: INTERNAL MEDICINE

## 2017-11-03 PROCEDURE — 74011000258 HC RX REV CODE- 258: Performed by: SURGERY

## 2017-11-03 PROCEDURE — 74011250636 HC RX REV CODE- 250/636: Performed by: SURGERY

## 2017-11-03 PROCEDURE — 36415 COLL VENOUS BLD VENIPUNCTURE: CPT | Performed by: INTERNAL MEDICINE

## 2017-11-03 PROCEDURE — 85027 COMPLETE CBC AUTOMATED: CPT | Performed by: INTERNAL MEDICINE

## 2017-11-03 RX ADMIN — VANCOMYCIN HYDROCHLORIDE 750 MG: 750 INJECTION, POWDER, LYOPHILIZED, FOR SOLUTION INTRAVENOUS at 12:37

## 2017-11-03 RX ADMIN — OXYBUTYNIN CHLORIDE 15 MG: 5 TABLET, FILM COATED, EXTENDED RELEASE ORAL at 18:06

## 2017-11-03 RX ADMIN — INSULIN LISPRO 7 UNITS: 100 INJECTION, SOLUTION INTRAVENOUS; SUBCUTANEOUS at 11:30

## 2017-11-03 RX ADMIN — INSULIN LISPRO 5 UNITS: 100 INJECTION, SOLUTION INTRAVENOUS; SUBCUTANEOUS at 09:00

## 2017-11-03 RX ADMIN — OXYCODONE HYDROCHLORIDE AND ACETAMINOPHEN 1 TABLET: 5; 325 TABLET ORAL at 11:09

## 2017-11-03 RX ADMIN — INSULIN LISPRO 7 UNITS: 100 INJECTION, SOLUTION INTRAVENOUS; SUBCUTANEOUS at 22:41

## 2017-11-03 RX ADMIN — OXYCODONE HYDROCHLORIDE AND ACETAMINOPHEN 1 TABLET: 5; 325 TABLET ORAL at 01:26

## 2017-11-03 RX ADMIN — ASPIRIN 81 MG: 81 TABLET, COATED ORAL at 09:00

## 2017-11-03 RX ADMIN — ENOXAPARIN SODIUM 40 MG: 40 INJECTION SUBCUTANEOUS at 18:05

## 2017-11-03 RX ADMIN — Medication 10 ML: at 03:24

## 2017-11-03 RX ADMIN — PIPERACILLIN SODIUM,TAZOBACTAM SODIUM 3.38 G: 3; .375 INJECTION, POWDER, FOR SOLUTION INTRAVENOUS at 01:16

## 2017-11-03 RX ADMIN — INSULIN LISPRO 5 UNITS: 100 INJECTION, SOLUTION INTRAVENOUS; SUBCUTANEOUS at 17:50

## 2017-11-03 RX ADMIN — COLLAGENASE SANTYL: 250 OINTMENT TOPICAL at 09:00

## 2017-11-03 RX ADMIN — ROPINIROLE HYDROCHLORIDE 0.5 MG: 0.25 TABLET, FILM COATED ORAL at 22:20

## 2017-11-03 RX ADMIN — LISINOPRIL 20 MG: 20 TABLET ORAL at 22:21

## 2017-11-03 RX ADMIN — PRAVASTATIN SODIUM 40 MG: 40 TABLET ORAL at 09:00

## 2017-11-03 RX ADMIN — IBUPROFEN 600 MG: 600 TABLET, FILM COATED ORAL at 03:18

## 2017-11-03 RX ADMIN — IBUPROFEN 600 MG: 600 TABLET, FILM COATED ORAL at 22:42

## 2017-11-03 RX ADMIN — PIPERACILLIN SODIUM,TAZOBACTAM SODIUM 3.38 G: 3; .375 INJECTION, POWDER, FOR SOLUTION INTRAVENOUS at 10:52

## 2017-11-03 RX ADMIN — OXYCODONE HYDROCHLORIDE AND ACETAMINOPHEN 1 TABLET: 5; 325 TABLET ORAL at 20:55

## 2017-11-03 RX ADMIN — OXYCODONE HYDROCHLORIDE AND ACETAMINOPHEN 1 TABLET: 5; 325 TABLET ORAL at 16:33

## 2017-11-03 RX ADMIN — PIPERACILLIN SODIUM,TAZOBACTAM SODIUM 4.5 G: 4; .5 INJECTION, POWDER, FOR SOLUTION INTRAVENOUS at 18:09

## 2017-11-03 RX ADMIN — Medication 10 ML: at 13:44

## 2017-11-03 NOTE — ROUTINE PROCESS
PCP follow up appointment scheduled and placed on AVS.   Alla Butcher, [address] [facility].  [phone]

## 2017-11-03 NOTE — PROGRESS NOTES
Hospitalist Progress Note    NAME: Alayna Echevarria   :  1960   MRN:  [id number]       Assessment / Plan:  Nonhealing diabetic foot ulcer with LLE cellulitis and concern for OM in setting of PAD and uncontrolled diabetes  -XR with  \"Large lateral soft tissue ulceration with underlying erosive  changes concerning for acute osteomyelitis. \"  -Afebrile without leukocytosis. Lactic wnl.  -Received IV vanc and ceftriaxone in ED. Continue IV antibiotics vancomycin and zosyn. Wound cx with pseudomonas, blood cultures negative so far. -Will request ID consult  -Consult to wound care.   -Pain control. -ESR 21, CRP 0.69  -Appreciate Surgery consult with Dr. Cristy Mckee, who has seen the patient outpatient.   -Arterial doppler of LE with PAD, s/p angiogram on  with Significant aortoiliac inflow disease. Fair runoff vasculature. Surgery following       DM II, uncontrolled  -A1C 10.5 10/3/2017  -hold pta actos and metformin  -accuchecks with SSI  - DTC education  -Follow insulin requirement will likely need long acting at time of DC.     HTN  -pta lisinopril     HLD  -pta statin     Hx of polio with residual BLE weakness  -pta requip     Tobacco use  -more than 1 PPD  -refuses nicotine patch        Code Status: full  Surrogate Decision Maker:   DVT Prophylaxis: lovenox  GI Prophylaxis: not indicated  Baseline: WC bound. Body mass index is 32.51 kg/(m^2). Subjective:     Chief Complaint / Reason for Physician Visit  \"foot pain, controlled with pain meds\". Discussed with RN events overnight. Review of Systems:  Symptom Y/N Comments  Symptom Y/N Comments   Fever/Chills n   Chest Pain n    Poor Appetite    Edema     Cough n   Abdominal Pain n    Sputum    Joint Pain     SOB/DARLING n   Pruritis/Rash     Nausea/vomit n   Tolerating PT/OT     Diarrhea n   Tolerating Diet     Constipation    Other       Could NOT obtain due to:      Objective:     VITALS:   Last 24hrs VS reviewed since prior progress note. Most recent are:  Patient Vitals for the past 24 hrs:   Temp Pulse Resp BP SpO2   11/03/17 1025 97.8 °F (36.6 °C) 72 17 145/64 100 %   11/03/17 0811 - - - 148/68 -   11/03/17 0611 98.5 °F (36.9 °C) 75 16 (!) 82/49 99 %   11/03/17 0200 97.7 °F (36.5 °C) 91 16 136/64 98 %   11/02/17 2200 98.2 °F (36.8 °C) 90 16 123/71 96 %   11/02/17 1838 98.4 °F (36.9 °C) 92 18 149/80 96 %   11/02/17 1654 - 84 18 115/78 98 %   11/02/17 1630 98.2 °F (36.8 °C) 82 18 144/81 99 %   11/02/17 1600 97.9 °F (36.6 °C) 77 19 127/59 98 %   11/02/17 1545 - 77 16 121/63 99 %   11/02/17 1540 - 80 16 117/69 99 %   11/02/17 1535 - 82 10 107/64 99 %   11/02/17 1530 - 80 11 113/46 99 %   11/02/17 1525 - 77 13 106/52 98 %   11/02/17 1520 - 88 18 101/48 100 %   11/02/17 1515 - 78 9 97/57 97 %   11/02/17 1511 98.1 °F (36.7 °C) 82 16 99/69 98 %   11/02/17 1510 - 83 11 96/47 99 %   11/02/17 1509 98.3 °F (36.8 °C) 83 11 95/50 100 %   11/02/17 1500 - 78 12 98/65 97 %       Intake/Output Summary (Last 24 hours) at 11/03/17 1254  Last data filed at 11/03/17 1021   Gross per 24 hour   Intake             1340 ml   Output              100 ml   Net             1240 ml        PHYSICAL EXAM:  General: WD, WN. Alert, cooperative, no acute distress, sitting in chair  EENT:  EOMI. Anicteric sclerae. MMM  Resp:  CTA bilaterally, no wheezing or rales. No accessory muscle use  CV:  Regular  rhythm,  No edema  GI:  Soft, Non distended, Non tender.  +Bowel sounds  Neurologic:  Alert and oriented X 3, normal speech, 4/5 weakness LE chronic  Psych:   Good insight. Not anxious nor agitated  Skin:  No rashes.   No jaundice,  Erythema, warmth of LLE, dressing left foot     Reviewed most current lab test results and cultures  YES  Reviewed most current radiology test results   YES  Review and summation of old records today    NO  Reviewed patient's current orders and MAR    YES  PMH/SH reviewed - no change compared to H&P  ________________________________________________________________________  Care Plan discussed with:    Comments   Patient x    Family  x  in room   RN x    Care Manager     Consultant                        Multidiciplinary team rounds were held today with , nursing, pharmacist and clinical coordinator. Patient's plan of care was discussed; medications were reviewed and discharge planning was addressed. ________________________________________________________________________  Total NON critical care TIME:  25   Minutes    Total CRITICAL CARE TIME Spent:   Minutes non procedure based      Comments   >50% of visit spent in counseling and coordination of care     ________________________________________________________________________  Michael Cornelius MD     Procedures: see electronic medical records for all procedures/Xrays and details which were not copied into this note but were reviewed prior to creation of Plan. LABS:  I reviewed today's most current labs and imaging studies.   Pertinent labs include:  Recent Labs      11/03/17 0325 11/01/17   0243   WBC  13.5*  10.4   HGB  10.6*  12.0   HCT  31.9*  36.2   PLT  290  303     Recent Labs      11/03/17 0325 11/02/17 0428  11/01/17   0243   NA  135*  137  141   K  3.9  4.2  3.5   CL  103  107  108   CO2  22  22  25   GLU  311*  182*  178*   BUN  14  13  14   CREA  0.64  0.49*  0.44*   CA  8.9  8.8  8.5       Signed: Michael Cornelius MD

## 2017-11-03 NOTE — PROGRESS NOTES
Pharmacy Automatic Renal Dosing Protocol - Antimicrobials    Indication for Antimicrobials: Non-healing DM foot w/ LLE cellulitis & concern for OM     Current Regimen of Each Antimicrobial:  Vancomycin 750mg IV q12h (Start Date 10/31; Day # 4)  Zosyn 3.375gm IV q8h; start 10/31; Day #4    Previous Antimicrobial Therapy:  Ceftriaxone 2gm x1    Goal Vancomycin Level: 15-20 for now    Significant Cultures:   10/31: Blood: ng x3 days (pending)  10/31: Wound: few pseudomonas - final - S zosyn  10/31: XRay L Ft: Large lateral soft tissue ulceration with underlying erosive  changes concerning for acute osteomyelitis. Paralysis, amputations, malnutrition: Polio/Post Polio Syndrome, Cerebral Palsy,     Labs:  Recent Labs      17   0325  17   0428  17   0243   CREA  0.64  0.49*  0.44*   BUN  14  13  14   WBC  13.5*   --   10.4     Temp (24hrs), Av.1 ° F (36.7 ° C), Min:97.7 ° F (36.5 ° C), Max:98.5 ° F (36.9 ° C)      Creatinine Clearance (mL/min) or Dialysis: 60    Impression/Plan:   - Cultures grew pseudomonas; will increase Zosyn  to 4.5 gm IV q8h  - Adjusted vancomycin time; RN to hang vanco by 1300. Will order vancomycin trough prior to 0100 dose tomorrow. Pharmacy will follow daily and adjust medications as appropriate for renal function and/or serum levels. Thank you,  Lázaro Toro, PHARMD          Recommended duration of therapy  http://[facility]/[place]/[facility]/Pharmacy/Clinical%20Companion/Duration%20of%20ABX%20therapy. docx    Renal Dosing  http://[facility]/[place]/[facility]/Pharmacy/Clinical%20Companion/Renal%[id number]. pdf

## 2017-11-03 NOTE — PROGRESS NOTES
Bedside interdisciplinary rounds were held today to discuss patient plan of care and outcomes. The following members were present: Nurse Practitioner, Nurse, Clinical Care Leader, Pharmacy, Physical Therapy, and Case Management.     Plan:  Patient waiting on discharge plan from Dr. Maxwell Bhatia. Francisco correia/rekha.

## 2017-11-03 NOTE — DIABETES MGMT
DTC Progress Note    Recommendations/ Comments: If appropriate, please consider adding NPH 10 units every 12 hours to aid in glucose control. Chart reviewed on [facility]. Patient is a 59 y.o. female with known history of Type 2 Diabetes on Metformin 1,000mg bid, Actos 30mg daily, and Toujeo 35 units bid (takes when MD has samples available) at home. A1c:   Lab Results   Component Value Date/Time    Hemoglobin A1c 10.5 10/03/2017 01:49 PM    Hemoglobin A1c 10.3 07/05/2017 04:29 PM       Recent Glucose Results:   Lab Results   Component Value Date/Time     (H) 11/03/2017 03:25 AM    GLUCPOC 269 (H) 11/03/2017 07:16 AM    GLUCPOC 288 (H) 11/03/2017 06:21 AM    GLUCPOC 450 (H) 11/02/2017 08:16 PM        Lab Results   Component Value Date/Time    Creatinine 0.64 11/03/2017 03:25 AM     Estimated Creatinine Clearance: 78.7 mL/min (based on Cr of 0.64). Active Orders   Diet    DIET DIABETIC CONSISTENT CARB Regular        PO intake: No data found. Current hospital DM medication:   -humalog normal sensitivity correction    Will continue to follow as needed.     Thank you    Cayla Griffith, [address], [address]   Office:  [phone]

## 2017-11-03 NOTE — PROGRESS NOTES
Pt a possible discharge over the weekend. Pt follow up appt has been made and entered on pt AVS.  CM will continue to follow for any additional discharge needs. If pt needs HH at discharge, due to pt self-pay status Clarisa Morin will be contacted.     Keith Lew, MSW  Ext [phone]

## 2017-11-03 NOTE — CONSULTS
Infectious Disease Consult Note    Reason for Consult: L foot  Cellulitis, and osteomyelitis   Date of Consultation: November 3, 2017  Date of Admission: 10/31/2017  Referring Physician: Trevon Wilkinson      HPI:  Yvrose Payne is a 59y.o. year old lady who say she was born with \"polio\" and cerebral palsy,   DM, HTN, and crohns who has had a non healing ulcer in L foot for \"24 months\" per her. She says she has B/L LE bone transplants per her done when was around 13 yrs of age. Says that she has had several surgeries done to her LE and in past had hardware that has since been removed. She recalls about 2 years ago, she had a debridement done at a office but no deep debridements, bone biopsies or surgeries in past year. She recalls being on PO Cephalexin about 3 times in the past year for cellulitis. While on cellulitis, says that erythema around wound improved and then recurred once antibiotics stopped. Her  helps her wt dressing her wound and says that she has had a yellow purulent discharge without foul odor from the wound. She reports using a wheelchair and has not been able to put weight on her LE due to pain and the wound. Denied trauma, fever, chills, other constitutional symptoms such as  night sweats, weight loss, loss of appetite recently. She saw Dr Garry Burrell in office 10/26 and was recommended admission for cellulitis on her LLE. She came to the ER 10/31 and admitted and started on IV Vancomycin and Zosyn. Also noted IV Ceftriaxone given once 10/31. X ray done of the foot on admission. Aortogram done on 11/2/17 as well. I am consulted today, 11/3  for abx recommendations.           Past Medical History:  Past Medical History:   Diagnosis Date    Acid indigestion     or heartburn    Arthritis     scince childhood    Cerebral palsy (HCC)     Crohn disease ([facility]     Diabetes ([facility]     Financial difficulties 10/4/2012    GERD (gastroesophageal reflux disease)     Hemorrhoids     Hernia of unspecified site of abdominal cavity without mention of obstruction or gangrene     Hypertension     Mitral regurgitation 10/5/2011    Muscle spasm 6/19/2014    chronic    PAD (peripheral artery disease) ([facility] [address] 6/19/2014    Polio     Primary osteoarthritis of both hips 8/9/2012    Skin disease, bullous     frequent    Spastic paralysis (HCC)     Stiff joint     Stroke ([facility] [address]     Tobacco abuse 8/10/2012    Urine incontinence     controlling    Weakness     of an arm or leg         Surgical History:  Past Surgical History:   Procedure Laterality Date    HX APPENDECTOMY      pt states surgery at 27 y.o    HX GYN  1995    hysterectomy    HX HEENT      toncillectomy childhood    HX ORTHOPAEDIC      Bone transplants, tendons stretched, legs turned    HX TRANSPLANT      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     MUSCLE-SKIN FLAP,LEG      [place] BONE MARROW HARVEST TRANSPLANTATION ALLOGENEIC           Family History:   Family History   Problem Relation Age of Onset    Diabetes Mother     Alcohol abuse Mother     Heart Disease Mother     Hypertension Mother     Cancer Father     Alcohol abuse Father     Diabetes Maternal Grandmother     Stroke Maternal Grandmother          Social History:     · Living Situation:lives wt   · Tobacco: long term smoker  · Alcohol: denied  · Illicit Drugs: denied  · Sexual History: , has    · Travel History: denied  · Exposures:   · Outdoor/Hiking: denied  · Animal/Pet: has many dogs at home , denied any dog bites or licks to the wound   · Construction: denied  · Hot Tub: denied   · Brackish/stagnant exposure: denied  · TB exposure: denied  · Sick Contacts: denied     Allergies: Allergies   Allergen Reactions    Potassium Other (comments)     Patient states she will refuse IV Potassium due to irritation-Patient will accept PO form.     Anaprox [Naproxen Sodium] Other (comments)     Blood pressure drops  Tolerates ibuprofen    Codeine Nausea and Vomiting     Tolerates oxycodone/acetaminophen    Mobic [Meloxicam] Other (comments)     Blood pressure drops  Tolerates ibuprofen    Tylenol [Acetaminophen] Other (comments)     \"Stomach hurts\", hx of Crohn's disease          Review of Systems:     Gen: Negative for chills, fevers, weight loss, weight gain   HEENT: Negative for headache, vision changes, ear ache or discharge, tingling,  nasal discharge, swelling, lumps in neck, sores on tongue   CV:  Negative for chest pain, dyspnea on exertion, leg edema   Lungs: Negative for shortness of breath, cough, wheezing   Abdomen: Negative for abdominal pain, nausea, vomiting, diarrhea, constipation   Genitourinary: Negative for genital pain or genital discharge     Neuro: Negative for headache, numbness, tingling, extremity weakness,  syncope, seizures    Skin: Negative for rash, sores/open wounds   Musculoskeletal: Negative for joint pain, joint swelling, joint erythema    Endocrine: Negative for high or low blood sugars, heat or cold intolerance    Psych: Negative for manic behavior     Medications:  No current facility-administered medications on file prior to encounter. Current Outpatient Prescriptions on File Prior to Encounter   Medication Sig Dispense Refill    oxyCODONE-acetaminophen (PERCOCET) 5-325 mg per tablet Take 1 Tab by mouth every four (4) hours as needed for Pain. Max Daily Amount: 6 Tabs. 45 Tab 0    oxybutynin (DITROPAN) 5 mg tablet TAKE ONE TABLET BY MOUTH 4 TIMES DAILY 120 Tab 0    metFORMIN (GLUCOPHAGE) 1,000 mg tablet Take 1 Tab by mouth two (2) times daily (with meals). 60 Tab 5    rOPINIRole (REQUIP) 0.5 mg tablet Take 1 Tab by mouth nightly. 30 Tab 5    ibuprofen (MOTRIN) 800 mg tablet Take 1 Tab by mouth every eight (8) hours as needed for Pain. 30 Tab 2    pioglitazone (ACTOS) 30 mg tablet Take 1 Tab by mouth daily.  30 Tab 5    pravastatin (PRAVACHOL) 40 mg tablet TAKE ONE TABLET BY MOUTH NIGHTLY 30 Tab 5    lisinopril (PRINIVIL, ZESTRIL) 20 mg tablet Take 20 mg by mouth nightly.  cholecalciferol (VITAMIN D3) 1,000 unit tablet Take 1,000 Units by mouth daily.  aspirin delayed-release (ASPIR-81) 81 mg tablet Take 1 Tab by mouth daily. 100 Tab prn    fish oil-omega-3 fatty acids 340-1,000 mg capsule Take 1 Cap by mouth daily.              Physical Exam:    Vitals: Patient Vitals for the past 24 hrs:   Temp Pulse Resp BP SpO2   11/03/17 1424 97.9 °F (36.6 °C) 82 17 127/57 98 %   11/03/17 1025 97.8 °F (36.6 °C) 72 17 145/64 100 %   11/03/17 0811 - - - 148/68 -   11/03/17 0611 98.5 °F (36.9 °C) 75 16 (!) 82/49 99 %   11/03/17 0200 97.7 °F (36.5 °C) 91 16 136/64 98 %   11/02/17 2200 98.2 °F (36.8 °C) 90 16 123/71 96 %   11/02/17 1838 98.4 °F (36.9 °C) 92 18 149/80 96 %   11/02/17 1654 - 84 18 115/78 98 %   11/02/17 1630 98.2 °F (36.8 °C) 82 18 144/81 99 %   11/02/17 1600 97.9 °F (36.6 °C) 77 19 127/59 98 %   ·   · GEN: NAD  · HEENT:  no scleral icterus, no thrush  · CV: S1, S2 heard regularly  · Lungs: Clear to auscultation bilaterally  · Abdomen: soft, non distended, non tender,  · Genitourinary:no stover  · Extremities: +  L lateral foot ulcer noted, + slight erythema around the wound, no discharge or foul odor, no edema RLE noted ,   · Neuro: Alert and alert,  moves all extremities to commands, verbal   · Skin: no rash  · Psych: good affect, good eye contact, non tearful   · Lines:  Peripheral       Labs:   Recent Results (from the past 24 hour(s))   GLUCOSE, POC    Collection Time: 11/02/17  4:26 PM   Result Value Ref Range    Glucose (POC) 189 (H) 65 - 100 mg/dL    Performed by Felecia Campbell \"Judy\"    GLUCOSE, POC    Collection Time: 11/02/17  8:16 PM   Result Value Ref Range    Glucose (POC) 450 (H) 65 - 100 mg/dL    Performed by Светлана Redd (PCT)    METABOLIC PANEL, BASIC    Collection Time: 11/03/17  3:25 AM   Result Value Ref Range    Sodium 135 (L) 136 - 145 mmol/L    Potassium 3.9 3.5 - 5.1 mmol/L    Chloride 103 97 - 108 mmol/L    CO2 22 21 - 32 mmol/L    Anion gap 10 5 - 15 mmol/L    Glucose 311 (H) 65 - 100 mg/dL    BUN 14 6 - 20 MG/DL    Creatinine 0.64 0.55 - 1.02 MG/DL    BUN/Creatinine ratio 22 (H) 12 - 20      GFR est AA >60 >60 ml/min/1.73m2    GFR est non-AA >60 >60 ml/min/1.73m2    Calcium 8.9 8.5 - 10.1 MG/DL   CBC W/O DIFF    Collection Time: 11/03/17  3:25 AM   Result Value Ref Range    WBC 13.5 (H) 3.6 - 11.0 K/uL    RBC 3.64 (L) 3.80 - 5.20 M/uL    HGB 10.6 (L) 11.5 - 16.0 g/dL    HCT 31.9 (L) 35.0 - 47.0 %    MCV 87.6 80.0 - 99.0 FL    MCH 29.1 26.0 - 34.0 PG    MCHC 33.2 30.0 - 36.5 g/dL    RDW 14.0 11.5 - 14.5 %    PLATELET 932 091 - 459 K/uL   GLUCOSE, POC    Collection Time: 11/03/17  6:21 AM   Result Value Ref Range    Glucose (POC) 288 (H) 65 - 100 mg/dL    Performed by Shantal Yeh    GLUCOSE, POC    Collection Time: 11/03/17  7:16 AM   Result Value Ref Range    Glucose (POC) 269 (H) 65 - 100 mg/dL    Performed by [id number], POC    Collection Time: 11/03/17 11:54 AM   Result Value Ref Range    Glucose (POC) 341 (H) 65 - 100 mg/dL    Performed by [facility]        CRP 0.69  ESR 21    Microbiology Data:       Blood: 10/31/17 negative     Wound 10/31 few WBC  FEW PSEUDOMONAS AERUGINOSA (A)       Culture & Susceptibility        Antibiotic   Organism Organism Organism       Pseudomonas aeruginosa       AMIKACIN ($)   <=16   ug/mL   S Final         AZTREONAM ($$$$)   <=4   ug/mL   S Final         CEFEPIME ($$)   <=4   ug/mL   S Final         CEFTAZIDIME ($)   4   ug/mL   S Final         CIPROFLOXACIN ($)   <=1   ug/mL   S Final         GENTAMICIN ($)   <=4   ug/mL   S Final         IMIPENEM   <=1   ug/mL   S Final         LEVOFLOXACIN ($)   <=2   ug/mL   S Final         MEROPENEM ($$)   <=1   ug/mL   S Final         PIPERACILLIN/TAZOBAC ($)   <=16   ug/mL   S Final         TOBRAMYCIN ($)   <=4   ug/mL   S Final            Component Value Ref Range & Units Status      Special Requests: NO SPECIAL REQUESTS   Final     GRAM STAIN 4+   GRAM POSITIVE COCCI      Final     GRAM STAIN FEW   GRAM POSITIVE RODS   (CORYNEFORM)      Final     GRAM STAIN FEW   GRAM NEGATIVE RODS      Final     Culture result: HEAVY   ESCHERICHIA COLI      Final     Culture result: HEAVY   MIXED SKIN MARKIE ISOLATED      Final        06/2016 Wound culture    Culture & Susceptibility        Antibiotic   Organism Organism Organism       Escherichia coli       AMIKACIN ($)   <=16   ug/mL   S Final         AMPICILLIN ($)   >16   ug/mL   R Final         AMPICILLIN/SULBACTAM ($)   >16/8   ug/mL   R Final         AZTREONAM ($$$$)   >16   ug/mL   R Final         CEFAZOLIN ($)   >16   ug/mL   R Final         CEFEPIME ($$)   <=4   ug/mL   S Final         CEFOTAXIME   16   ug/mL   I Final         CEFTAZIDIME ($)   >16   ug/mL   R Final         CEFTRIAXONE ($)   32   ug/mL   R Final         CEFUROXIME ($)   >16   ug/mL   R Final         CIPROFLOXACIN ($)   <=1   ug/mL   S Final         GENTAMICIN ($)   <=4   ug/mL   S Final         IMIPENEM   <=1   ug/mL   S Final         LEVOFLOXACIN ($)   <=2   ug/mL   S Final         MEROPENEM ($$)   <=1   ug/mL   S Final         PIPERACILLIN/TAZOBAC ($)   >64   ug/mL   R Final         TOBRAMYCIN ($)   <=4   ug/mL   S Final         TRIMETH-SULFAMETHOXA   >2/38   ug/mL   R Final                          03/2016 Wound  Special Requests: NO SPECIAL REQUESTS   Final      GRAM STAIN NO ORGANISMS SEEN   Final     Culture result: LIGHT   STAPHYLOCOCCUS AUREUS      Final     Culture result: SCANT   ENTEROCOCCUS FAECALIS GROUP D      Final       Culture & Susceptibility        Antibiotic   Organism Organism Organism        Enterococcus faecalis group D      AMPICILLIN ($)     <=2   ug/mL   S Final       AMPICILLIN/SULBACTAM ($) STAPHYLOCOCCUS AUREUS  <=8/4   ug/mL   S Final         CEFAZOLIN ($) STAPHYLOCOCCUS AUREUS  <=4   ug/mL   S Final         CIPROFLOXACIN ($) STAPHYLOCOCCUS AUREUS  <=1   ug/mL   S Final         CLINDAMYCIN ($) STAPHYLOCOCCUS AUREUS  <=0.5   ug/mL   S Final         DAPTOMYCIN ($$$$$)     2   ug/mL   S Final       DAPTOMYCIN ($$$$$) STAPHYLOCOCCUS AUREUS  <=0.5   ug/mL   S Final         ERYTHROMYCIN ($$$$) STAPHYLOCOCCUS AUREUS  <=0.5   ug/mL   S Final         GENTAMICIN ($) STAPHYLOCOCCUS AUREUS  <=4   ug/mL   S Final         LINEZOLID ($$$$$)     2   ug/mL   S Final       LINEZOLID ($$$$$) STAPHYLOCOCCUS AUREUS  2   ug/mL   S Final         OXACILLIN STAPHYLOCOCCUS AUREUS  <=0.25   ug/mL   S Final         PENICILLIN G ($$)     2   ug/mL   S Final       RIFAMPIN ($$$$) STAPHYLOCOCCUS AUREUS  <=1   ug/mL   S Final         TETRACYCLINE STAPHYLOCOCCUS AUREUS  <=4   ug/mL   S Final         TRIMETH-SULFAMETHOXA STAPHYLOCOCCUS AUREUS  <=0.5/9.5   ug/mL   S Final         VANCOMYCIN ($)     2   ug/mL   S Final       VANCOMYCIN ($) STAPHYLOCOCCUS AUREUS  2   ug/mL   S Final                              Comments        Organism: Enterococcus faecalis group D Antibiotic: AMPICILLIN ($)     Method: PERI                  Organism:  (STAPHYLOCOCCUS AUREUS) Antibiotic: AMPICILLIN/SULBACTAM ($)     Method: PERI                  Organism:  (STAPHYLOCOCCUS AUREUS) Antibiotic: CEFAZOLIN ($)     Method: PERI                  Organism:  (STAPHYLOCOCCUS AUREUS) Antibiotic: CIPROFLOXACIN ($)     Method: PERI                  Organism:  (STAPHYLOCOCCUS AUREUS) Antibiotic: CLINDAMYCIN ($)     Method: PERI                  Organism: Enterococcus faecalis group D Antibiotic: DAPTOMYCIN ($$$$$)     Method: PERI                  Organism:  (STAPHYLOCOCCUS AUREUS) Antibiotic: DAPTOMYCIN ($$$$$)     Method: PERI                  Organism:  (STAPHYLOCOCCUS AUREUS) Antibiotic: ERYTHROMYCIN ($$$$)     Method: PERI                  Organism:  (STAPHYLOCOCCUS AUREUS) Antibiotic: GENTAMICIN ($)     Method: PERI                  Organism: Enterococcus faecalis group D Antibiotic: LINEZOLID ($$$$$)     Method: PERI                  Organism:  (STAPHYLOCOCCUS AUREUS) Antibiotic: LINEZOLID ($$$$$)     Method: PERI                  Organism:  (STAPHYLOCOCCUS AUREUS) Antibiotic: OXACILLIN     Method: PERI                  Organism: Enterococcus faecalis group D Antibiotic: PENICILLIN G ($$)     Method: PERI                  Organism:  (STAPHYLOCOCCUS AUREUS) Antibiotic: RIFAMPIN ($$$$)     Method: PERI     Comment: Rifampin is not to be used for mono-therapy.                  Organism:  (STAPHYLOCOCCUS AUREUS) Antibiotic: TETRACYCLINE     Method: PERI                  Organism:  (STAPHYLOCOCCUS AUREUS) Antibiotic: TRIMETH-SULFAMETHOXA     Method: PERI                  Organism: Enterococcus faecalis group D Antibiotic: VANCOMYCIN ($)     Method: PERI                  Organism:  (STAPHYLOCOCCUS AUREUS) Antibiotic: VANCOMYCIN ($)     Method: PERI                Imaging:   X ray  FINDINGS:  Three views of the left foot demonstrate a large lateral soft tissue  ulceration. Underlying erosive changes are seen in the lateral tarsal joints  and talus.     IMPRESSION  IMPRESSION:  Large lateral soft tissue ulceration with underlying erosive  changes concerning for acute osteomyelitis.     MRI foot 3/2016  FINDINGS: The second toe crosses over the first.     Bone marrow: within normal limits. No fracture, dislocation, or marrow   replacing process. No evidence of stress reaction or periostitis. No   evidence of osteomyelitis.     Joint fluid:  No significant joint effusion.     Tendons: Intact.     Muscles: Within normal limits.     Neurovascular bundles: Within normal limits.     Articular cartilage:Intact without focal osteochondral lesion. No evidence   of erosions.     Soft tissues: There is extensive edema over the dorsum of the foot. No   evidence of a focal drainable fluid collection. No evidence of a mass.         IMPRESSION:   Extensive edema over the dorsum of the foot without evidence of a focal   drainable fluid collection.  No evidence of osteomyelitis. MRI tib/fib 3/2016  FINDINGS: Bone marrow: No bone marrow edema. No evidence of osteomyelitis. Midfoot medial angulation is unchanged.     Joint fluid:  Small tibiotalar and subtalar joint effusions are unchanged.     Tendons: Absent versus diminutive peroneus brevis tendon is unchanged. Anterior dislocation of the peroneus longus tendon is unchanged. No new   tendon pathology.     Muscles: Edema is unchanged.     Osteoarthritis is unchanged.     Soft tissue mass: No pathologically enhancing mass or drainable fluid   collection. Edema in the subcutaneous adipose tissues is not significantly   changed since last year.         IMPRESSION:   1. Unchanged evidence of cellulitis. No abscess. 2. No osteomyelitis. 3. Unchanged mild myositis. Procedures: Aortogram wt run off L foot 11/2    Assessment / Plan:     Ms Sudheer Yap is a 59y.o. year old lady who says she was born with \"polio\" and cerebral palsy,   DM, HTN, and crohns who has had a non healing chronic Left foot ulcer ( for about 2 years). She says she has B/L LE bone transplants per her done when was around 13 yrs of age. Says that she has had several surgeries done to her LE and in past had hardware that has since been removed. She recalls being on PO Cephalexin about 3 times in the past year for cellulitis. While on cellulitis, says that erythema around wound improved and then recurred once antibiotics stopped. Admitted after being seen in clinic for cellulitis and has been on Vancomycin and Zosyn since 10/31. Inflammatory markers elevated and X ray wt large lateral soft tissue ulceration with underlying erosive changes concerning for acute osteomyelitis. Of note, she has had MRI of L foot and tib/fib in 3/2016 and no osteomyelitis noted at that time. She also had an Aortogram done 11/2 to assess PAD. Past wound cultures have grown MSSA, Ecoli and Ampicillin Enterococcus. However, site not specified.      1) Left foot chronic ulcer wt concerns for osteomyelitis, Cellulitis of LLE    Recommendations:  · Check MRI of L foot. Of note last MRI 3/2016 was negative for osteo of foot, tibia, and fibula  · Base on MRI, if osteomyelitis seen would recommend bone biopsy to help wt culture directed therapy for IV antibiotics. So far cultures wt Pseudomonas this admission, however cannot entirely rely on superficial cultures in the setting of her chronic wound and colonization issues  · This is all in all a very complicated situation given her comorbidities and PAD. If severe PAD, concerned about antibiotic delivery to site of action as well   · At this time, broadly covered wt IV Vancomycin and Zosyn   · Cellulitis is improving wt this therapy per discussion wt her and the team  · Cultures can be skewed due to current abx as well but given cellulitis on presentation, agree wt covering wt Vancomycin and Zosyn   · In review of past cultures, no MRSA positive cultures noted and MRSA surveillance cultures negative. However, this negative nasal swab does always correlate wt culture results. Cellulitis has improved on Vancomycin and Zosyn making it challenging to stop and obtain bone cultures off antibiotics which would be  ideal   · Optimizing DM and smoking cessation will aid in infection control and healing   · Vancomycin goal trough of 15-20 wt close renal monitoring  · Dose abx per pharmacy    2)  DM per primary team     3) Hx of cerebral palsy     4) HTN    5) DVT Px     Discussed wt Dr Kiley Luz and Dr Anjali Wiley       Thank for the opportunity to participate in the care of this patient. Please contact with questions or concerns. Dr Mitra Ortega is covering this weekend.      Steffanie Villanueva, DO  4:21 PM

## 2017-11-03 NOTE — ROUTINE PROCESS
Bedside and Verbal shift change report given to [address] (oncoming nurse) by Allyson Oden RN (offgoing nurse). Report included the following information SBAR, Kardex, Procedure Summary, Intake/Output, MAR and Recent Results.

## 2017-11-03 NOTE — ROUTINE PROCESS
Bedside and Verbal shift change report given to Yesy Mccollum (oncoming nurse) by Arlen Cagle (offgoing nurse). Report included the following information SBAR, Kardex, Intake/Output, MAR and Recent Results.

## 2017-11-03 NOTE — PROGRESS NOTES
Surgery    Angio reviewed with radiologist and also with Dr Shawn Zayas. Fairly complex disease, mainly aortoiliac and femoral.   Distal vessels are open. I will await Dr Ady Martinez input on feasibility of intervention. Discussed case with ID. I will order MRI of foot. I discussed potential for bone biopsy with Dr Severiano Cramp. He will await MRI result.     Sameera Pérez MD

## 2017-11-04 ENCOUNTER — APPOINTMENT (OUTPATIENT)
Dept: MRI IMAGING | Age: 57
DRG: 638 | End: 2017-11-04
Attending: SURGERY
Payer: SELF-PAY

## 2017-11-04 PROBLEM — L97.529 CHRONIC ULCER OF LEFT FOOT (HCC): Status: ACTIVE | Noted: 2017-11-04

## 2017-11-04 PROBLEM — L03.116 CELLULITIS OF LEFT LOWER LEG: Status: ACTIVE | Noted: 2017-11-04

## 2017-11-04 PROBLEM — A49.8 PSEUDOMONAS AERUGINOSA INFECTION: Status: ACTIVE | Noted: 2017-11-04

## 2017-11-04 LAB
ANION GAP SERPL CALC-SCNC: 12 MMOL/L (ref 5–15)
BUN SERPL-MCNC: 16 MG/DL (ref 6–20)
BUN/CREAT SERPL: 17 (ref 12–20)
CALCIUM SERPL-MCNC: 8.4 MG/DL (ref 8.5–10.1)
CHLORIDE SERPL-SCNC: 106 MMOL/L (ref 97–108)
CO2 SERPL-SCNC: 22 MMOL/L (ref 21–32)
CREAT SERPL-MCNC: 0.96 MG/DL (ref 0.55–1.02)
DATE LAST DOSE: NORMAL
GLUCOSE BLD STRIP.AUTO-MCNC: 227 MG/DL (ref 65–100)
GLUCOSE BLD STRIP.AUTO-MCNC: 268 MG/DL (ref 65–100)
GLUCOSE BLD STRIP.AUTO-MCNC: 283 MG/DL (ref 65–100)
GLUCOSE BLD STRIP.AUTO-MCNC: 396 MG/DL (ref 65–100)
GLUCOSE SERPL-MCNC: 331 MG/DL (ref 65–100)
POTASSIUM SERPL-SCNC: 3.6 MMOL/L (ref 3.5–5.1)
REPORTED DOSE,DOSE: NORMAL UNITS
REPORTED DOSE/TIME,TMG: 1230
SERVICE CMNT-IMP: ABNORMAL
SODIUM SERPL-SCNC: 140 MMOL/L (ref 136–145)
VANCOMYCIN TROUGH SERPL-MCNC: 8.7 UG/ML (ref 5–10)

## 2017-11-04 PROCEDURE — A9576 INJ PROHANCE MULTIPACK: HCPCS | Performed by: INTERNAL MEDICINE

## 2017-11-04 PROCEDURE — 74011250637 HC RX REV CODE- 250/637: Performed by: INTERNAL MEDICINE

## 2017-11-04 PROCEDURE — 74011000258 HC RX REV CODE- 258: Performed by: SURGERY

## 2017-11-04 PROCEDURE — 74011250636 HC RX REV CODE- 250/636: Performed by: INTERNAL MEDICINE

## 2017-11-04 PROCEDURE — 74011636637 HC RX REV CODE- 636/637: Performed by: INTERNAL MEDICINE

## 2017-11-04 PROCEDURE — 80202 ASSAY OF VANCOMYCIN: CPT | Performed by: INTERNAL MEDICINE

## 2017-11-04 PROCEDURE — 82962 GLUCOSE BLOOD TEST: CPT

## 2017-11-04 PROCEDURE — 73720 MRI LWR EXTREMITY W/O&W/DYE: CPT

## 2017-11-04 PROCEDURE — 36415 COLL VENOUS BLD VENIPUNCTURE: CPT | Performed by: INTERNAL MEDICINE

## 2017-11-04 PROCEDURE — 80048 BASIC METABOLIC PNL TOTAL CA: CPT | Performed by: INTERNAL MEDICINE

## 2017-11-04 PROCEDURE — 65270000029 HC RM PRIVATE

## 2017-11-04 PROCEDURE — 74011250636 HC RX REV CODE- 250/636: Performed by: SURGERY

## 2017-11-04 RX ORDER — FLUCONAZOLE 100 MG/1
150 TABLET ORAL
Status: COMPLETED | OUTPATIENT
Start: 2017-11-04 | End: 2017-11-04

## 2017-11-04 RX ORDER — INSULIN GLARGINE 100 [IU]/ML
10 INJECTION, SOLUTION SUBCUTANEOUS DAILY
Status: DISCONTINUED | OUTPATIENT
Start: 2017-11-05 | End: 2017-11-05

## 2017-11-04 RX ORDER — CIPROFLOXACIN 2 MG/ML
400 INJECTION, SOLUTION INTRAVENOUS EVERY 8 HOURS
Status: DISCONTINUED | OUTPATIENT
Start: 2017-11-04 | End: 2017-11-06

## 2017-11-04 RX ORDER — INSULIN LISPRO 100 [IU]/ML
7 INJECTION, SOLUTION INTRAVENOUS; SUBCUTANEOUS ONCE
Status: COMPLETED | OUTPATIENT
Start: 2017-11-04 | End: 2017-11-04

## 2017-11-04 RX ORDER — SODIUM CHLORIDE 0.9 % (FLUSH) 0.9 %
10 SYRINGE (ML) INJECTION
Status: COMPLETED | OUTPATIENT
Start: 2017-11-04 | End: 2017-11-04

## 2017-11-04 RX ADMIN — CIPROFLOXACIN 400 MG: 2 INJECTION, SOLUTION INTRAVENOUS at 22:15

## 2017-11-04 RX ADMIN — Medication 10 ML: at 12:00

## 2017-11-04 RX ADMIN — FLUCONAZOLE 150 MG: 100 TABLET ORAL at 15:57

## 2017-11-04 RX ADMIN — COLLAGENASE SANTYL: 250 OINTMENT TOPICAL at 12:04

## 2017-11-04 RX ADMIN — OXYCODONE HYDROCHLORIDE AND ACETAMINOPHEN 1 TABLET: 5; 325 TABLET ORAL at 16:13

## 2017-11-04 RX ADMIN — ENOXAPARIN SODIUM 40 MG: 40 INJECTION SUBCUTANEOUS at 17:32

## 2017-11-04 RX ADMIN — OXYBUTYNIN CHLORIDE 15 MG: 5 TABLET, FILM COATED, EXTENDED RELEASE ORAL at 17:32

## 2017-11-04 RX ADMIN — INSULIN LISPRO 5 UNITS: 100 INJECTION, SOLUTION INTRAVENOUS; SUBCUTANEOUS at 17:32

## 2017-11-04 RX ADMIN — Medication 10 ML: at 21:06

## 2017-11-04 RX ADMIN — PIPERACILLIN SODIUM,TAZOBACTAM SODIUM 4.5 G: 4; .5 INJECTION, POWDER, FOR SOLUTION INTRAVENOUS at 03:08

## 2017-11-04 RX ADMIN — Medication 10 ML: at 06:11

## 2017-11-04 RX ADMIN — CIPROFLOXACIN 400 MG: 2 INJECTION, SOLUTION INTRAVENOUS at 15:57

## 2017-11-04 RX ADMIN — LISINOPRIL 20 MG: 20 TABLET ORAL at 21:05

## 2017-11-04 RX ADMIN — VANCOMYCIN HYDROCHLORIDE 750 MG: 750 INJECTION, POWDER, LYOPHILIZED, FOR SOLUTION INTRAVENOUS at 01:48

## 2017-11-04 RX ADMIN — PIPERACILLIN SODIUM,TAZOBACTAM SODIUM 4.5 G: 4; .5 INJECTION, POWDER, FOR SOLUTION INTRAVENOUS at 09:04

## 2017-11-04 RX ADMIN — ASPIRIN 81 MG: 81 TABLET, COATED ORAL at 09:01

## 2017-11-04 RX ADMIN — INSULIN LISPRO 3 UNITS: 100 INJECTION, SOLUTION INTRAVENOUS; SUBCUTANEOUS at 12:08

## 2017-11-04 RX ADMIN — OXYCODONE HYDROCHLORIDE AND ACETAMINOPHEN 1 TABLET: 5; 325 TABLET ORAL at 12:07

## 2017-11-04 RX ADMIN — OXYCODONE HYDROCHLORIDE AND ACETAMINOPHEN 1 TABLET: 5; 325 TABLET ORAL at 21:06

## 2017-11-04 RX ADMIN — INSULIN LISPRO 7 UNITS: 100 INJECTION, SOLUTION INTRAVENOUS; SUBCUTANEOUS at 23:11

## 2017-11-04 RX ADMIN — GADOTERIDOL 13 ML: 279.3 INJECTION, SOLUTION INTRAVENOUS at 11:04

## 2017-11-04 RX ADMIN — INSULIN LISPRO 5 UNITS: 100 INJECTION, SOLUTION INTRAVENOUS; SUBCUTANEOUS at 09:02

## 2017-11-04 RX ADMIN — IBUPROFEN 600 MG: 600 TABLET, FILM COATED ORAL at 23:12

## 2017-11-04 RX ADMIN — ROPINIROLE HYDROCHLORIDE 0.5 MG: 0.25 TABLET, FILM COATED ORAL at 21:06

## 2017-11-04 RX ADMIN — Medication 10 ML: at 14:05

## 2017-11-04 RX ADMIN — PRAVASTATIN SODIUM 40 MG: 40 TABLET ORAL at 09:01

## 2017-11-04 NOTE — PROGRESS NOTES
Infectious Disease Progress Note        IMPRESSION:   1. Chronic ulcer left lateral foot looks better, no drainage,slough. 2. Probe to bone test negative on exam.Erosive changes on xray- Related to ?infection? 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 MRI negative for Acute or Chronic Osteomyelitis, ESR CRP non impressive. 4. Wound culture + for Pseudomonas aeruginosa  5. Cellulitis of left lower leg improving  6. Poorly controlled Diabetes  7. Peripheral arterial  disease  8. Smoker ( 1ppd)      Discussed at length with pt & spouse. Above 3  factors contributory  to  poor wound healing. Advised aggressive life style change with smoking cessation & dietary & medication  adherence for better control of Diabetes. Pt voiced understanding. PLAN:   1. Continue wound dressings   2. Elevate lower leg  3. Tight blood sugar control  4. Stop smoking, counseled patient  5. Change to Cipro IV   6. Xray left calcaneum       Subjective:      Pt seen . Has pain in lower legs & feet , worse in left lower leg, pain in left  Heel. Review of Systems:  A comprehensive review of systems was negative except for that written in the History of Present Illness. Objective:     Blood pressure 144/86, pulse 80, temperature 97.9 °F (36.6 °C), resp. rate 18, weight 65.8 kg (145 lb), SpO2 100 %. Temp (24hrs), Av °F (36.7 °C), Min:97.7 °F (36.5 °C), Max:98.4 °F (36.9 °C)      Lines:  Peripheral IV:            Physical Exam:   General:  Alert, cooperative, well developed, appears stated age   Eyes:  Sclera anicteric. Pupils equally round and reactive to light. Mouth/Throat: Mucous membranes normal, oral pharynx clear   Neck: Supple   Lungs:   Clear to auscultation bilaterally, good effort   CV:  Regular rate and rhythm,no murmur, click, rub or gallop   Abdomen:   Soft, non-tender. bowel sounds normal. non-distended   Extremities: Base     No cyanosis ,+edema Left lower leg observed. Erythema , warmth of lower third of left leg & foot . Lateral aspect of foot triangular ulcer noted. 5x 5 x4.5 cm. No  Odor. Skin: Skin texture abnormal  lower legs . Erythema, warmth  of left lower leg & foot . Lymph nodes: Cervical and supraclavicular normal   Musculoskeletal: No swelling or deformity   Lines/Devices:  Intact, no erythema, drainage or tenderness   Psych: Alert and oriented, normal mood affect       Data Review:   CBC:   Recent Labs      11/03/17   0325   WBC  13.5*   RBC  3.64*   HGB  10.6*   HCT  31.9*   PLT  290     CMP:   Recent Labs      11/04/17   0029  11/03/17   0325  11/02/17   0428   GLU  331*  311*  182*   NA  140  135*  137   K  3.6  3.9  4.2   CL  106  103  107   CO2  22  22  22   BUN  16  14  13   CREA  0.96  0.64  0.49*   CA  8.4*  8.9  8.8   AGAP  12  10  8   BUCR  17  22*  27*       Studies:      Lab Results   Component Value Date/Time    Culture result: FEW PSEUDOMONAS AERUGINOSA 10/31/2017 04:16 PM    Culture result: NO GROWTH 4 DAYS 10/31/2017 10:44 AM    Culture result: NO GROWTH 4 DAYS 10/31/2017 10:44 AM    Culture result: MRSA NOT PRESENT 06/29/2016 01:25 AM    Culture result:  06/29/2016 01:25 AM         Screening of patient nares for MRSA is for surveillance purposes and, if positive, to facilitate isolation considerations in high risk settings. It is not intended for automatic decolonization interventions per se as regimens are not sufficiently effective to warrant routine use. XR Results (most recent):    Results from Hospital Encounter encounter on 10/31/17   XR FOOT LT MIN 3 V   Narrative EXAM:  XR FOOT LT MIN 3 V    INDICATION:   left lateral foot wound, evaluate for osteomyelitis. COMPARISON:  3/2/2016    FINDINGS:  Three views of the left foot demonstrate a large lateral soft tissue  ulceration. Underlying erosive changes are seen in the lateral tarsal joints  and talus.          Impression IMPRESSION:  Large lateral soft tissue ulceration with underlying erosive  changes concerning for acute osteomyelitis. Patient Active Problem List   Diagnosis Code    Crohn disease ([facility] K50.90    Polio A80.9    Cerebral palsy ([facility] G80.9    Controlled type 2 DM with peripheral circulatory disorder ([facility] E11.51    TIA (transient ischemic attack) G45.9    Migraines G43.909    HTN (hypertension) I10    Elevated lipids E78.5    Edema extremities R60.0    Primary osteoarthritis of both hips M16.0    Tobacco abuse Z72.0    Financial difficulties Z59.8    Muscle spasm M62.838    PAD (peripheral artery disease) ([facility]) I73.9    Asymptomatic bacteriuria R82.71    Wound of left ankle S91.002A    Urinary incontinence R32    Restless leg syndrome G25.81    Cellulitis of ankle L03.119    Arterial insufficiency with ischemic ulcer ([facility]) I77.1, L98.499    Sepsis ([facility]) A41.9    At risk for renal compromise Z91.89    Cellulitis of groin L03.314    Essential hypertension with goal blood pressure less than 140/90 I10    Post-polio syndrome G14    Type 2 diabetes mellitus with diabetic neuropathy ([facility]) E11.40    Hyperkalemia E87.5    Overactive bladder N32.81    Cellulitis L03.90    Open wound of left foot S91.302A         ICD-10-CM ICD-9-CM    1. Diabetic ulcer of left midfoot associated with type 2 diabetes mellitus, with bone involvement without evidence of necrosis ([facility] E11.621 250.80     L97.426 707.14    2. Cellulitis of left lower extremity L03.116 682.6    3. PAD (peripheral artery disease) ([facility]) I73.9 443.9        I have discussed the diagnosis with the patient and the intended plan as seen in the above orders. I have discussed medication side effects and warnings with the patient as well.     Reviewed test results at length with patient    Anti-infectives:      Pip- tazobactam IV D4   Vancomycin IV D4    Dell Rashid MD [place]

## 2017-11-04 NOTE — ROUTINE PROCESS
Bedside and Verbal shift change report given to JUNIOR Briseno (oncoming nurse) by Tonny Solares RN (offgoing nurse). Report included the following information SBAR, Kardex, Intake/Output, MAR, Recent Results and Med Rec Status.

## 2017-11-04 NOTE — PROGRESS NOTES
Pharmacy Automatic Renal Dosing Protocol - Antimicrobials  Indication for Antimicrobials: Non-healing DM foot w/ LLE cellulitis & concern for OM     Current Regimen of Each Antimicrobial:  Vancomycin 750mg IV q12h - Started  10/31; Day # 5  Zosyn 3.375gm IV q8h; Started 10/31; Day #5    Previous Antimicrobial Therapy:  Ceftriaxone 2gm x1    Goal Vancomycin Level: 15-20 for now    Significant Cultures:   10/31: Blood: ng x4 days (pending)  10/31: Wound: few pseudomonas - final - S zosyn  10/31: XRay L Ft: Large lateral soft tissue ulceration with underlying erosive  changes concerning for acute osteomyelitis. Paralysis, amputations, malnutrition: Polio/Post Polio Syndrome, Cerebral Palsy,     Labs:  Recent Labs      17   0029  17   0325  17   0428   CREA  0.96  0.64  0.49*   BUN  16  14  13   WBC   --   13.5*   --      Temp (24hrs), Av °F (36.7 °C), Min:97.7 °F (36.5 °C), Max:98.4 °F (36.9 °C)      Creatinine Clearance (mL/min) or Dialysis: 60    Impression/Plan:   Zosyn 4.5 gm IV every 8hrs continues along with Vancomycin  - afebrile, WBC increasing  - Vancomycin trough level = 8.7 mcg/ml drawn this am is lower than desired, however dosing has been off schedule. Creatinine is rising, and patient is at risk for ATN from the combination of Vancomycin and Zosyn. - Will repeat a Vancomycin trough level tonight to assess tolerance with consistent on schedule dosing. Will order for 11-5 am before the 2 am dose  - per ID consult (Dr. Adrianna Orona):  Checking MRI of L foot for OM  · This is all in all a very complicated situation given her comorbidities and PAD. If severe PAD, concerned about antibiotic delivery to site of action as well   · At this time, broadly covered wt IV Vancomycin and Zosyn   · Cellulitis is improving,  agree wt covering wt Vancomycin and Zosyn      Pharmacy will follow daily and adjust medications as appropriate for renal function and/or serum levels.     Thank you,  Sarah Beth Hernandez Tamie Standard, [facility]

## 2017-11-04 NOTE — PROGRESS NOTES
Pharmacy Automatic Renal Dosing Protocol - Antimicrobials    Indication for Antimicrobials: psudomonal coverage for chronic foot ulcer     Current Regimen of Each Antimicrobial:  Ciprofloxacin IV  (Start Date 17; Day # 1)    Previous Antimicrobial Therapy:  Zosyn 4.5g IV q8h (Start Date 11/3; Day    Significant Cultures:   [place] susceptible psuedomonas in wound with low PERI to ciprofloxacin    Paralysis, amputations, malnutrition: NA    Labs:  Recent Labs      17   0029  17   0325  17   0428   CREA  0.96  0.64  0.49*   BUN  16  14  13   WBC   --   13.5*   --      Temp (24hrs), Av °F (36.7 °C), Min:97.7 °F (36.5 °C), Max:98.4 °F (36.9 °C)      Creatinine Clearance (mL/min) or Dialysis: 52  No results found for: PCT    Impression/Plan: Start Cipro 400mg q8h for pseudomonal coverage     Pharmacy will follow daily and adjust medications as appropriate for renal function and/or serum levels. Thank you,  Jesika Pierce, [facility]          Recommended duration of therapy  http://[facility]/[place]/[facility]/Pharmacy/Clinical%20Companion/Duration%20of%20ABX%20therapy. docx    Renal Dosing  [email or web address]/[place]/[facility]/Pharmacy/Clinical%20Companion/Renal%[id number]. pdf

## 2017-11-04 NOTE — ROUTINE PROCESS
Bedside and Verbal shift change report given to [facility], RN (oncoming nurse) by Zackary Dawson RN (offgoing nurse). Report included the following information SBAR, Kardex, Intake/Output, MAR, Recent Results and Med Rec Status.

## 2017-11-04 NOTE — ROUTINE PROCESS
Patient's blood glucose is not well controlled. I spoke with Dr. Curtis Bermudez and she said she would work with pharmacy to make changes to patient's MAR.

## 2017-11-04 NOTE — PROGRESS NOTES
Hospitalist Progress Note    NAME: Rosemary Benites   :  1960   MRN:  [id number]       Assessment / Plan:  Nonhealing diabetic foot ulcer with LLE cellulitis and concern for OM in setting of PAD and uncontrolled diabetes  -XR with  \"Large lateral soft tissue ulceration with underlying erosive  changes concerning for acute osteomyelitis. \"  MRI with no abscess/OM  -Afebrile without leukocytosis. Lactic wnl.  -Received IV vanc and ceftriaxone in ED. Continue IV antibiotics vancomycin and zosyn. Wound cx with pseudomonas, blood cultures negative so far. -Appreciate ID consult, see ID note  -wound care.   -Pain control. -ESR 21, CRP 0.69  -Appreciate Surgery consult. Will likely need bone biopsy. -Arterial doppler of LE with PAD, s/p angiogram on  with Significant aortoiliac inflow disease. Fair runoff vasculature. Vascular surgery consult       DM II, uncontrolled  -A1C 10.5 10/3/2017  -hold pta actos and metformin  -accuchecks with SSI, start lantus  - DTC education  -Follow insulin requirement will likely need long acting at time of DC.     HTN  -pta lisinopril     HLD  -pta statin     Hx of polio with residual BLE weakness  -pta requip     Tobacco use  -more than 1 PPD  -refuses nicotine patch        Code Status: full  Surrogate Decision Maker:   DVT Prophylaxis: lovenox  GI Prophylaxis: not indicated  Baseline: WC bound. Body mass index is 32.51 kg/(m^2). Subjective:     Chief Complaint / Reason for Physician Visit  \"foot pain is controlled\". Discussed with RN events overnight.      Review of Systems:  Symptom Y/N Comments  Symptom Y/N Comments   Fever/Chills n   Chest Pain n    Poor Appetite    Edema     Cough n   Abdominal Pain n    Sputum    Joint Pain     SOB/DARLING n   Pruritis/Rash     Nausea/vomit n   Tolerating PT/OT     Diarrhea n   Tolerating Diet     Constipation    Other       Could NOT obtain due to:      Objective:     VITALS:   Last 24hrs VS reviewed since prior progress note. Most recent are:  Patient Vitals for the past 24 hrs:   Temp Pulse Resp BP SpO2   11/04/17 1200 97.9 °F (36.6 °C) 80 18 144/86 100 %   11/04/17 1029 98 °F (36.7 °C) 82 18 150/70 97 %   11/04/17 0715 98.4 °F (36.9 °C) 75 18 150/77 99 %   11/04/17 0300 97.7 °F (36.5 °C) 81 16 139/65 98 %   11/03/17 2256 98.1 °F (36.7 °C) 81 18 114/53 94 %   11/03/17 2220 - 89 - 124/60 -   11/03/17 1819 97.8 °F (36.6 °C) 89 17 124/60 100 %   11/03/17 1424 97.9 °F (36.6 °C) 82 17 127/57 98 %       Intake/Output Summary (Last 24 hours) at 11/04/17 1341  Last data filed at 11/03/17 1844   Gross per 24 hour   Intake              600 ml   Output                0 ml   Net              600 ml        PHYSICAL EXAM:  General: WD, WN. Alert, cooperative, no acute distress, sitting in chair  EENT:  EOMI. Anicteric sclerae. MMM  Resp:  CTA bilaterally, no wheezing or rales. No accessory muscle use  CV:  Regular  rhythm,  No edema  GI:  Soft, Non distended, Non tender.  +Bowel sounds  Neurologic:  Alert and oriented X 3, normal speech, 4/5 weakness LE chronic  Psych:   Good insight. Not anxious nor agitated  Skin:  No rashes. No jaundice,  Erythema, warmth of LLE, edema dressing left foot     Reviewed most current lab test results and cultures  YES  Reviewed most current radiology test results   YES  Review and summation of old records today    NO  Reviewed patient's current orders and MAR    YES  PMH/SH reviewed - no change compared to H&P  ________________________________________________________________________  Care Plan discussed with:    Comments   Patient x    Family  x  in room   RN x    Care Manager     Consultant                        Multidiciplinary team rounds were held today with , nursing, pharmacist and clinical coordinator. Patient's plan of care was discussed; medications were reviewed and discharge planning was addressed. ________________________________________________________________________  Total NON critical care TIME:  25   Minutes    Total CRITICAL CARE TIME Spent:   Minutes non procedure based      Comments   >50% of visit spent in counseling and coordination of care     ________________________________________________________________________  Yessica Chen MD     Procedures: see electronic medical records for all procedures/Xrays and details which were not copied into this note but were reviewed prior to creation of Plan. LABS:  I reviewed today's most current labs and imaging studies.   Pertinent labs include:  Recent Labs      11/03/17   0325   WBC  13.5*   HGB  10.6*   HCT  31.9*   PLT  290     Recent Labs      11/04/17   0029  11/03/17   0325  11/02/17   0428   NA  140  135*  137   K  3.6  3.9  4.2   CL  106  103  107   CO2  22  22  22   GLU  331*  311*  182*   BUN  16  14  13   CREA  0.96  0.64  0.49*   CA  8.4*  8.9  8.8       Signed: Yessica Chen MD

## 2017-11-04 NOTE — PROGRESS NOTES
Problem: Falls - Risk of  Goal: *Absence of Falls  Document Parveen Fall Risk and appropriate interventions in the flowsheet.    Outcome: Progressing Towards Goal  Fall Risk Interventions:  Mobility Interventions: Patient to call before getting OOB         Medication Interventions: Patient to call before getting OOB, Teach patient to arise slowly    Elimination Interventions: Call light in reach

## 2017-11-05 ENCOUNTER — APPOINTMENT (OUTPATIENT)
Dept: GENERAL RADIOLOGY | Age: 57
DRG: 638 | End: 2017-11-05
Attending: INTERNAL MEDICINE
Payer: SELF-PAY

## 2017-11-05 LAB
ALBUMIN SERPL-MCNC: 3.3 G/DL (ref 3.5–5)
ALBUMIN/GLOB SERPL: 1 {RATIO} (ref 1.1–2.2)
ALP SERPL-CCNC: 80 U/L (ref 45–117)
ALT SERPL-CCNC: 57 U/L (ref 12–78)
ANION GAP SERPL CALC-SCNC: 6 MMOL/L (ref 5–15)
AST SERPL-CCNC: 37 U/L (ref 15–37)
BASOPHILS # BLD: 0 K/UL (ref 0–0.1)
BASOPHILS NFR BLD: 0 % (ref 0–1)
BILIRUB SERPL-MCNC: 0.3 MG/DL (ref 0.2–1)
BUN SERPL-MCNC: 13 MG/DL (ref 6–20)
BUN/CREAT SERPL: 23 (ref 12–20)
CALCIUM SERPL-MCNC: 8.3 MG/DL (ref 8.5–10.1)
CHLORIDE SERPL-SCNC: 106 MMOL/L (ref 97–108)
CO2 SERPL-SCNC: 27 MMOL/L (ref 21–32)
CREAT SERPL-MCNC: 0.57 MG/DL (ref 0.55–1.02)
EOSINOPHIL # BLD: 0.3 K/UL (ref 0–0.4)
EOSINOPHIL NFR BLD: 3 % (ref 0–7)
ERYTHROCYTE [DISTWIDTH] IN BLOOD BY AUTOMATED COUNT: 14.2 % (ref 11.5–14.5)
GLOBULIN SER CALC-MCNC: 3.4 G/DL (ref 2–4)
GLUCOSE BLD STRIP.AUTO-MCNC: 249 MG/DL (ref 65–100)
GLUCOSE BLD STRIP.AUTO-MCNC: 266 MG/DL (ref 65–100)
GLUCOSE BLD STRIP.AUTO-MCNC: 267 MG/DL (ref 65–100)
GLUCOSE BLD STRIP.AUTO-MCNC: 313 MG/DL (ref 65–100)
GLUCOSE SERPL-MCNC: 254 MG/DL (ref 65–100)
HCT VFR BLD AUTO: 33.6 % (ref 35–47)
HGB BLD-MCNC: 11 G/DL (ref 11.5–16)
LYMPHOCYTES # BLD: 2.9 K/UL (ref 0.8–3.5)
LYMPHOCYTES NFR BLD: 30 % (ref 12–49)
MCH RBC QN AUTO: 28.1 PG (ref 26–34)
MCHC RBC AUTO-ENTMCNC: 32.7 G/DL (ref 30–36.5)
MCV RBC AUTO: 85.7 FL (ref 80–99)
MONOCYTES # BLD: 0.5 K/UL (ref 0–1)
MONOCYTES NFR BLD: 5 % (ref 5–13)
NEUTS SEG # BLD: 5.9 K/UL (ref 1.8–8)
NEUTS SEG NFR BLD: 62 % (ref 32–75)
PLATELET # BLD AUTO: 292 K/UL (ref 150–400)
POTASSIUM SERPL-SCNC: 3.9 MMOL/L (ref 3.5–5.1)
PROT SERPL-MCNC: 6.7 G/DL (ref 6.4–8.2)
RBC # BLD AUTO: 3.92 M/UL (ref 3.8–5.2)
SERVICE CMNT-IMP: ABNORMAL
SODIUM SERPL-SCNC: 139 MMOL/L (ref 136–145)
WBC # BLD AUTO: 9.6 K/UL (ref 3.6–11)

## 2017-11-05 PROCEDURE — 80053 COMPREHEN METABOLIC PANEL: CPT | Performed by: INTERNAL MEDICINE

## 2017-11-05 PROCEDURE — 74011636637 HC RX REV CODE- 636/637: Performed by: INTERNAL MEDICINE

## 2017-11-05 PROCEDURE — 85025 COMPLETE CBC W/AUTO DIFF WBC: CPT | Performed by: INTERNAL MEDICINE

## 2017-11-05 PROCEDURE — 82962 GLUCOSE BLOOD TEST: CPT

## 2017-11-05 PROCEDURE — 73650 X-RAY EXAM OF HEEL: CPT

## 2017-11-05 PROCEDURE — 74011250636 HC RX REV CODE- 250/636: Performed by: INTERNAL MEDICINE

## 2017-11-05 PROCEDURE — 65270000029 HC RM PRIVATE

## 2017-11-05 PROCEDURE — 74011250637 HC RX REV CODE- 250/637: Performed by: INTERNAL MEDICINE

## 2017-11-05 PROCEDURE — 36415 COLL VENOUS BLD VENIPUNCTURE: CPT | Performed by: INTERNAL MEDICINE

## 2017-11-05 RX ORDER — INSULIN GLARGINE 100 [IU]/ML
14 INJECTION, SOLUTION SUBCUTANEOUS DAILY
Status: DISCONTINUED | OUTPATIENT
Start: 2017-11-06 | End: 2017-11-06

## 2017-11-05 RX ADMIN — OXYCODONE HYDROCHLORIDE AND ACETAMINOPHEN 1 TABLET: 5; 325 TABLET ORAL at 04:05

## 2017-11-05 RX ADMIN — CIPROFLOXACIN 400 MG: 2 INJECTION, SOLUTION INTRAVENOUS at 05:52

## 2017-11-05 RX ADMIN — Medication 10 ML: at 21:29

## 2017-11-05 RX ADMIN — Medication 10 ML: at 05:52

## 2017-11-05 RX ADMIN — IBUPROFEN 600 MG: 600 TABLET, FILM COATED ORAL at 18:53

## 2017-11-05 RX ADMIN — LISINOPRIL 20 MG: 20 TABLET ORAL at 21:19

## 2017-11-05 RX ADMIN — ASPIRIN 81 MG: 81 TABLET, COATED ORAL at 08:50

## 2017-11-05 RX ADMIN — OXYCODONE HYDROCHLORIDE AND ACETAMINOPHEN 1 TABLET: 5; 325 TABLET ORAL at 11:01

## 2017-11-05 RX ADMIN — ENOXAPARIN SODIUM 40 MG: 40 INJECTION SUBCUTANEOUS at 17:59

## 2017-11-05 RX ADMIN — ROPINIROLE HYDROCHLORIDE 0.5 MG: 0.25 TABLET, FILM COATED ORAL at 21:19

## 2017-11-05 RX ADMIN — INSULIN LISPRO 5 UNITS: 100 INJECTION, SOLUTION INTRAVENOUS; SUBCUTANEOUS at 16:02

## 2017-11-05 RX ADMIN — INSULIN LISPRO 3 UNITS: 100 INJECTION, SOLUTION INTRAVENOUS; SUBCUTANEOUS at 21:17

## 2017-11-05 RX ADMIN — PRAVASTATIN SODIUM 40 MG: 40 TABLET ORAL at 08:50

## 2017-11-05 RX ADMIN — OXYBUTYNIN CHLORIDE 15 MG: 5 TABLET, FILM COATED, EXTENDED RELEASE ORAL at 17:58

## 2017-11-05 RX ADMIN — OXYCODONE HYDROCHLORIDE AND ACETAMINOPHEN 1 TABLET: 5; 325 TABLET ORAL at 22:25

## 2017-11-05 RX ADMIN — CIPROFLOXACIN 400 MG: 2 INJECTION, SOLUTION INTRAVENOUS at 21:28

## 2017-11-05 RX ADMIN — INSULIN LISPRO 7 UNITS: 100 INJECTION, SOLUTION INTRAVENOUS; SUBCUTANEOUS at 17:57

## 2017-11-05 RX ADMIN — INSULIN LISPRO 3 UNITS: 100 INJECTION, SOLUTION INTRAVENOUS; SUBCUTANEOUS at 08:51

## 2017-11-05 RX ADMIN — OXYCODONE HYDROCHLORIDE AND ACETAMINOPHEN 1 TABLET: 5; 325 TABLET ORAL at 15:58

## 2017-11-05 RX ADMIN — IBUPROFEN 600 MG: 600 TABLET, FILM COATED ORAL at 07:12

## 2017-11-05 RX ADMIN — CIPROFLOXACIN 400 MG: 2 INJECTION, SOLUTION INTRAVENOUS at 16:00

## 2017-11-05 RX ADMIN — OXYCODONE HYDROCHLORIDE AND ACETAMINOPHEN 1 TABLET: 5; 325 TABLET ORAL at 01:12

## 2017-11-05 RX ADMIN — Medication 10 ML: at 14:03

## 2017-11-05 RX ADMIN — INSULIN GLARGINE 10 UNITS: 100 INJECTION, SOLUTION SUBCUTANEOUS at 08:44

## 2017-11-05 RX ADMIN — COLLAGENASE SANTYL: 250 OINTMENT TOPICAL at 08:51

## 2017-11-05 NOTE — PROGRESS NOTES
Nutrition LOS Screen  LOS: 5    Assessment:      Past Medical History:   Diagnosis Date    Acid indigestion     or heartburn    Arthritis     scince childhood    [facility]     Crohn disease ([facility]     Diabetes ([facility]     Financial difficulties 10/4/2012    GERD (gastroesophageal reflux disease)     Hemorrhoids     Hernia of unspecified site of abdominal cavity without mention of obstruction or gangrene     Hypertension     Mitral regurgitation 10/5/2011    Muscle spasm 6/19/2014    chronic    PAD (peripheral artery disease) ([facility] 6/19/2014    Polio     Primary osteoarthritis of both hips 8/9/2012    Skin disease, bullous     frequent    Spastic paralysis ([facility]     Stiff joint     Stroke ([facility]     Tobacco abuse 8/10/2012    Urine incontinence     controlling    Weakness     of an arm or leg       % Eaten:  Patient Vitals for the past 72 hrs:   % Diet Eaten   11/03/17 1844 100 %   11/03/17 1508 100 %   11/03/17 1021 100 %     Pertinent Medications: [x]Reviewed:   Pertinent Labs: [x]Reviewed:  249-291  Food Allergies: [x]NKFA  []Other   Last BM:  11/4  Edema:        []RUE   []LUE   [x]RLE   [x]LLE      Pressure Ulcer:      [] Stage I   [] Stage II   [] Stage III   [] Stage IV (diabetic wound-ankle)      Wt Readings from Last 30 Encounters:   10/31/17 65.8 kg (145 lb)   10/03/17 65.8 kg (145 lb)   07/05/17 60.8 kg (134 lb)   03/29/17 66.2 kg (146 lb)   12/28/16 63.5 kg (140 lb)   10/25/16 66.7 kg (147 lb)   07/05/16 69.4 kg (153 lb)   06/28/16 69.9 kg (154 lb)   06/22/16 69.9 kg (154 lb)   05/02/16 68.7 kg (151 lb 8 oz)   03/07/16 71.8 kg (158 lb 6 oz)   03/02/16 70.8 kg (156 lb)   03/03/16 70.8 kg (156 lb)   02/08/16 70 kg (154 lb 6 oz)   11/17/15 68 kg (150 lb)   10/14/15 68 kg (150 lb)   09/08/15 68 kg (150 lb)   08/29/15 68 kg (150 lb)   04/13/15 75.3 kg (166 lb)   02/24/15 81.2 kg (179 lb)   01/06/15 81.2 kg (179 lb)   12/17/14 81.2 kg (179 lb)   12/16/14 81.2 kg (179 lb)   10/20/14 72.6 kg (160 lb)   09/11/14 72.6 kg (160 lb)   09/02/14 72.6 kg (160 lb)   08/08/14 72.6 kg (160 lb)   07/17/14 71.7 kg (158 lb)   07/07/14 83 kg (183 lb)   07/02/14 83 kg (183 lb)       Anthropometrics:   Height:  4'8\" Weight: 65.8 kg (145 lb)     BMI: Body mass index is 32.51 kg/(m^2). This BMI is indicative of:   []Underweight    []Normal    []Overweight    [x] Obesity   [] Extreme Obesity (BMI>40)     Chart reviewed, pt determined to be at Low risk. Full assessment by RD will done in 4-6 days.       Kenny Deras RD, [address]   Pager: [phone]

## 2017-11-05 NOTE — PROGRESS NOTES
11/4/2017  10:25 PM    Hospitalist on call paged for BS of [phone].     10:44 PM  Hospitalist repaged at this time. 10:46 PM  Dr. Nanda Brothers returned page; reports calling back page earlier but received busy signal on phone line. Orders received for one time dose of 7 units of humalog insulin at this time.

## 2017-11-05 NOTE — PROGRESS NOTES
Problem: Cellulitis Care Plan (Adult)  Goal: *Control of acute pain  Outcome: Progressing Towards Goal  Patient states that \"usually this pain medication works well. Just not tonight. \"    Will continue to monitor.  Pain went from 10 to 8 to 5 on numeric scale (0-10)

## 2017-11-05 NOTE — ROUTINE PROCESS
,11/5/2017   1:48 AM    The ending of Daylight Saving Time occurred at 0200 hrs. Documentation of patient care and medications administered is done with respect to the time change.

## 2017-11-05 NOTE — PROGRESS NOTES
Hospitalist Progress Note    NAME: Kayla Azar   :  1960   MRN:  [id number]       Assessment / Plan:  Nonhealing diabetic foot ulcer with LLE cellulitis and concern for OM in setting of PAD and uncontrolled diabetes  -XR with  \"Large lateral soft tissue ulceration with underlying erosive  changes concerning for acute osteomyelitis. \"  MRI with no abscess/OM  -Afebrile without leukocytosis. Lactic wnl.  -Received IV vanc and ceftriaxone, switched to cipro iv  by ID. Wound cx with pseudomonas, blood cultures negative so far. -Appreciate ID consult, see ID note  -wound care.   -Pain control. -ESR 21, CRP 0.69  -Appreciate Surgery consult.   -Arterial doppler of LE with PAD, s/p angiogram on  with Significant aortoiliac inflow disease. Fair runoff vasculature. Vascular surgery consult       DM II, uncontrolled  -A1C 10.5 10/3/2017  -hold pta actos and metformin  -accuchecks with SSI, started lantus , increased , monitor titrate up as indicated  - DTC education     HTN  -pta lisinopril     HLD  -pta statin     Hx of polio with residual BLE weakness  -pta requip     Tobacco use  -more than 1 PPD  -refuses nicotine patch        Code Status: full  Surrogate Decision Maker:   DVT Prophylaxis: lovenox  GI Prophylaxis: not indicated  Baseline: WC bound. Body mass index is 32.51 kg/(m^2). Subjective:     Chief Complaint / Reason for Physician Visit  \"no new c/o\". Discussed with RN events overnight. Review of Systems:  Symptom Y/N Comments  Symptom Y/N Comments   Fever/Chills n   Chest Pain n    Poor Appetite    Edema     Cough n   Abdominal Pain n    Sputum    Joint Pain     SOB/DARLING n   Pruritis/Rash     Nausea/vomit n   Tolerating PT/OT     Diarrhea n   Tolerating Diet     Constipation    Other       Could NOT obtain due to:      Objective:     VITALS:   Last 24hrs VS reviewed since prior progress note.  Most recent are:  Patient Vitals for the past 24 hrs:   Temp Pulse Resp BP SpO2   11/05/17 1056 97.6 °F (36.4 °C) 78 18 143/82 95 %   11/05/17 0520 97.8 °F (36.6 °C) 78 18 146/86 98 %   11/05/17 0144 97.9 °F (36.6 °C) 75 18 148/80 98 %   11/04/17 2311 97.8 °F (36.6 °C) 75 18 121/65 98 %   11/04/17 2104 - 80 16 167/80 -   11/04/17 1855 98.4 °F (36.9 °C) 88 18 131/69 97 %   11/04/17 1405 97.9 °F (36.6 °C) 82 18 (!) 146/94 100 %     No intake or output data in the 24 hours ending 11/05/17 1220     PHYSICAL EXAM:  General: WD, WN. Alert, cooperative, no acute distress, sitting in chair  EENT:  EOMI. Anicteric sclerae. MMM  Resp:  CTA bilaterally, no wheezing or rales. No accessory muscle use  CV:  Regular  rhythm,  No edema  GI:  Soft, Non distended, Non tender.  +Bowel sounds  Neurologic:  Alert and oriented X 3, normal speech, 4/5 weakness LE chronic  Psych:   Good insight. Not anxious nor agitated  Skin:  No rashes. No jaundice, Erythema better, warmth of LLE, less edema dressing left foot     Reviewed most current lab test results and cultures  YES  Reviewed most current radiology test results   YES  Review and summation of old records today    NO  Reviewed patient's current orders and MAR    YES  PMH/SH reviewed - no change compared to H&P  ________________________________________________________________________  Care Plan discussed with:    Comments   Patient x    Family  x  in room   RN x    Care Manager     Consultant                        Multidiciplinary team rounds were held today with , nursing, pharmacist and clinical coordinator. Patient's plan of care was discussed; medications were reviewed and discharge planning was addressed.      ________________________________________________________________________  Total NON critical care TIME:  25   Minutes    Total CRITICAL CARE TIME Spent:   Minutes non procedure based      Comments   >50% of visit spent in counseling and coordination of care ________________________________________________________________________  Lani Tamayo MD     Procedures: see electronic medical records for all procedures/Xrays and details which were not copied into this note but were reviewed prior to creation of Plan. LABS:  I reviewed today's most current labs and imaging studies.   Pertinent labs include:  Recent Labs      11/05/17 1126 11/03/17   0325   WBC  9.6  13.5*   HGB  11.0*  10.6*   HCT  33.6*  31.9*   PLT  292  290     Recent Labs      11/05/17 1126 11/04/17   0029  11/03/17   0325   NA  139  140  135*   K  3.9  3.6  3.9   CL  106  106  103   CO2  27  22  22   GLU  254*  331*  311*   BUN  13  16  14   CREA  0.57  0.96  0.64   CA  8.3*  8.4*  8.9   ALB  3.3*   --    --    TBILI  0.3   --    --    SGOT  37   --    --    ALT  57   --    --        Signed: Lani Tamayo MD

## 2017-11-06 ENCOUNTER — HOME HEALTH ADMISSION (OUTPATIENT)
Dept: HOME HEALTH SERVICES | Facility: HOME HEALTH | Age: 57
End: 2017-11-06
Payer: COMMERCIAL

## 2017-11-06 ENCOUNTER — TELEPHONE (OUTPATIENT)
Dept: FAMILY MEDICINE CLINIC | Age: 57
End: 2017-11-06

## 2017-11-06 VITALS
BODY MASS INDEX: 32.51 KG/M2 | DIASTOLIC BLOOD PRESSURE: 54 MMHG | SYSTOLIC BLOOD PRESSURE: 99 MMHG | WEIGHT: 145 LBS | OXYGEN SATURATION: 94 % | RESPIRATION RATE: 18 BRPM | TEMPERATURE: 98.6 F | HEART RATE: 90 BPM

## 2017-11-06 DIAGNOSIS — S91.002S WOUND OF LEFT ANKLE, SEQUELA: ICD-10-CM

## 2017-11-06 LAB
BACTERIA SPEC CULT: NORMAL
BACTERIA SPEC CULT: NORMAL
GLUCOSE BLD STRIP.AUTO-MCNC: 230 MG/DL (ref 65–100)
GLUCOSE BLD STRIP.AUTO-MCNC: 254 MG/DL (ref 65–100)
SERVICE CMNT-IMP: ABNORMAL
SERVICE CMNT-IMP: ABNORMAL
SERVICE CMNT-IMP: NORMAL
SERVICE CMNT-IMP: NORMAL

## 2017-11-06 PROCEDURE — 74011636637 HC RX REV CODE- 636/637: Performed by: INTERNAL MEDICINE

## 2017-11-06 PROCEDURE — 74011250636 HC RX REV CODE- 250/636: Performed by: INTERNAL MEDICINE

## 2017-11-06 PROCEDURE — 74011250637 HC RX REV CODE- 250/637: Performed by: INTERNAL MEDICINE

## 2017-11-06 PROCEDURE — 82962 GLUCOSE BLOOD TEST: CPT

## 2017-11-06 RX ORDER — SAME BUTANEDISULFONATE/BETAINE 400-600 MG
250 POWDER IN PACKET (EA) ORAL 2 TIMES DAILY
Qty: 20 CAP | Refills: 0 | Status: SHIPPED | OUTPATIENT
Start: 2017-11-06 | End: 2017-11-16

## 2017-11-06 RX ORDER — LEVOFLOXACIN 750 MG/1
750 TABLET ORAL
Status: DISCONTINUED | OUTPATIENT
Start: 2017-11-06 | End: 2017-11-06 | Stop reason: HOSPADM

## 2017-11-06 RX ORDER — MORPHINE SULFATE 4 MG/ML
1 INJECTION INTRAVENOUS
Status: DISCONTINUED | OUTPATIENT
Start: 2017-11-06 | End: 2017-11-06 | Stop reason: HOSPADM

## 2017-11-06 RX ORDER — INSULIN GLARGINE 100 [IU]/ML
18 INJECTION, SOLUTION SUBCUTANEOUS DAILY
Status: DISCONTINUED | OUTPATIENT
Start: 2017-11-07 | End: 2017-11-06 | Stop reason: HOSPADM

## 2017-11-06 RX ORDER — LEVOFLOXACIN 750 MG/1
750 TABLET ORAL
Qty: 10 TAB | Refills: 0 | Status: SHIPPED | OUTPATIENT
Start: 2017-11-06 | End: 2017-11-16

## 2017-11-06 RX ADMIN — OXYCODONE HYDROCHLORIDE AND ACETAMINOPHEN 1 TABLET: 5; 325 TABLET ORAL at 04:31

## 2017-11-06 RX ADMIN — INSULIN LISPRO 3 UNITS: 100 INJECTION, SOLUTION INTRAVENOUS; SUBCUTANEOUS at 11:17

## 2017-11-06 RX ADMIN — CIPROFLOXACIN 400 MG: 2 INJECTION, SOLUTION INTRAVENOUS at 08:11

## 2017-11-06 RX ADMIN — INSULIN LISPRO 5 UNITS: 100 INJECTION, SOLUTION INTRAVENOUS; SUBCUTANEOUS at 08:14

## 2017-11-06 RX ADMIN — ASPIRIN 81 MG: 81 TABLET, COATED ORAL at 09:24

## 2017-11-06 RX ADMIN — Medication 10 ML: at 08:11

## 2017-11-06 RX ADMIN — OXYCODONE HYDROCHLORIDE AND ACETAMINOPHEN 1 TABLET: 5; 325 TABLET ORAL at 12:49

## 2017-11-06 RX ADMIN — OXYCODONE HYDROCHLORIDE AND ACETAMINOPHEN 1 TABLET: 5; 325 TABLET ORAL at 08:19

## 2017-11-06 RX ADMIN — COLLAGENASE SANTYL: 250 OINTMENT TOPICAL at 09:23

## 2017-11-06 RX ADMIN — INSULIN GLARGINE 14 UNITS: 100 INJECTION, SOLUTION SUBCUTANEOUS at 08:14

## 2017-11-06 RX ADMIN — LEVOFLOXACIN 750 MG: 750 TABLET, FILM COATED ORAL at 15:48

## 2017-11-06 RX ADMIN — MORPHINE SULFATE 1 MG: 4 INJECTION INTRAVENOUS at 10:53

## 2017-11-06 RX ADMIN — PRAVASTATIN SODIUM 40 MG: 40 TABLET ORAL at 09:24

## 2017-11-06 NOTE — PROGRESS NOTES
IP consult for [facility] at discharge. Due to self-pay status PRABHU ARMENTA [facility] would be her provider. Referral sent for their review. Pt PCP follow up appt made and entered on pt AVS.  No additional discharge needs. Care Management Interventions  PCP Verified by CM: Yes  Mode of Transport at Discharge:  Other (see comment) (Pt spouse to provide transportation at d/c)  Transition of Care Consult (CM Consult): [address]: Yes  Discharge Durable Medical Equipment:  (Pt has rollator and wheelchair at residence.)  Physical Therapy Consult: No  Occupational Therapy Consult: No  Current Support Network: Lives with Spouse (Pt resides in one story residence with spouse.)  Confirm Follow Up Transport:  (Pt spouse able to provide trasnsport to follow-up appts as needed.)  Plan discussed with Pt/Family/Caregiver: Yes  Discharge Location  Discharge Placement: Home with home health    Sundar Mckeon, MSW  Ext [phone]

## 2017-11-06 NOTE — PROGRESS NOTES
Reviewed discharge instructions, prescriptions, and side effects with patient and her . Reviewed wound care, follow-up instructions, and medication instructions. Answered all questions and provided contact information for future questions. Took IV out per policy, Catheter tip intact. Going home in a car with home health and her .

## 2017-11-06 NOTE — DISCHARGE INSTRUCTIONS
HOSPITALIST DISCHARGE INSTRUCTIONS    NAME: Trevin Copeland   :  1960   MRN:  [id number]     Date/Time:  2017 1:48 PM    ADMIT DATE: 10/31/2017   DISCHARGE DATE: 2017         · It is important that you take the medication exactly as they are prescribed. · Keep your medication in the bottles provided by the pharmacist and keep a list of the medication names, dosages, and times to be taken in your wallet. · Do not take other medications without consulting your doctor. What to do at Home    Recommended diet:  Cardiac Diet, Diabetic Diet and Low fat, Low cholesterol    Recommended activity: Activity as tolerated  Wound care  dress with santyl, telfa, foam and gauze ( deandra) daily  Per wound care recommendations  Check BP, blood glucse twice daily, keep log, call PCP if BP > 150/90 or < 100/60. Or Blood glucose > 200 or < 90      If you have questions regarding the hospital related prescriptions or hospital related issues please call [facility] at [phone]. You can always direct your questions to your primary care doctor if you are unable to reach your hospital physician; your PCP works as an extension of your hospital doctor just like your hospital doctor is an extension of your PCP for your time at the hospital [facility], [facility])    If you experience any of the following symptoms then please call your primary care physician or return to the emergency room if you cannot get hold of your doctor:    Fever, chills, nausea, vomiting, or persistent diarrhea  Worsening weakness or new problems with your speech or balance  Dark stools or visible blood in your stools  New Leg swelling or shortness of breath as this could be signs of a clot    Additional Instructions:      Bring these papers with you to your follow up appointments.  The papers will help your doctors be sure to continue the care plan from the hospital.              Information obtained by :  I understand that if any problems occur once I am at home I am to contact my physician. I understand and acknowledge receipt of the instructions indicated above.                                                                                                                                            Physician's or R.N.'s Signature                                                                  Date/Time                                                                                                                                              Patient or Representative Signature

## 2017-11-06 NOTE — DISCHARGE SUMMARY
Hospitalist Discharge Summary     Patient ID:  Yvrose Payne  [id number]  59 y.o.  1960    PCP on record: Morrison Cogan, MD    Admit date: 10/31/2017  Discharge date and time: 11/6/2017      DISCHARGE DIAGNOSIS:  Nonhealing diabetic foot ulcer with LLE cellulitis in the setting of PAD and uncontrolled diabetes  MRI negative for OM  -XR with  \"Large lateral soft tissue ulceration with underlying erosive  changes concerning for acute osteomyelitis. \"  MRI with no abscess/OM  -Afebrile without leukocytosis. Lactic wnl.  -Received IV vanc and ceftriaxone, switched to cipro iv 11/4 by ID. Wound cx with pseudomonas, blood cultures negative so far. -Appreciate ID consult, see ID note, rec Po levaquin for 10 days  -wound care.   -Pain control. -ESR 21, CRP 0.69  -Appreciate Surgery consult.   -Arterial doppler of LE with PAD, s/p angiogram on 11/2 with Significant aortoiliac inflow disease. Fair runoff vasculature. Vascular surgery consult, D/w Dr. Garry Burrell, ok to discharge with OP follow-up in 1 week         DM II, uncontrolled  -A1C 10.5 10/3/2017  -hold pta actos and metformin  -accuchecks with SSI, started lantus 11/4, increased 11/6, monitor titrate up as indicated  - [employer] education  Resume home meds on discharge      HTN  -pta lisinopril      HLD  -pta statin      Hx of polio with residual BLE weakness  -pta requip      Tobacco use  -more than 1 PPD  -refuses nicotine patch          Code Status: full  Surrogate Decision Maker:   DVT Prophylaxis: lovenox  GI Prophylaxis: not indicated  Baseline: WC bound. Dispo: Home with , wound care        Body mass index is 32.51 kg/(m^2). CONSULTATIONS:  IP CONSULT TO GENERAL SURGERY  IP CONSULT TO INFECTIOUS DISEASES    Excerpted HPI from H&P of Yoanna Torrez MD:  Cristian Carrillo is a 59 y.o.    female with PMH of polio with residual LE weakness, DM II, PAD, HTN, HLD, and tobacco abuse presents with complaint of nonhealing diabetic foot ulcer now with warmth/erythema and concern for cellulitis. Patient has history of nonhealing foot wound for the last two years. She was recently referred to surgery, Dr. Quinton Arredondo, as an outpatient who recommended she present to the ED but patient decided to wait through the weekend before presenting. Says she feels her left leg is becoming more erythematous and warm. In ED patient is afebrile with stable vitals. XR left foot with  \"Large lateral soft tissue ulceration with underlying erosive changes concerning for acute osteomyelitis. \" labs with wbc 10.4. Started on IV antibiotics in the ED.      We were asked to admit for work up and evaluation of the above problems. ______________________________________________________________________  DISCHARGE SUMMARY/HOSPITAL COURSE:  for full details see H&P, daily progress notes, labs, consult notes. _______________________________________________________________________  Patient seen and examined by me on discharge day. Pertinent Findings:  Gen:    Not in distress  Chest: Clear lungs  CVS:   Regular rhythm. No edema  Abd:  Soft, not distended, not tender  Neuro:  Alert, oriented, at baseline. Ext  erythema, swelling better of lt foot  _______________________________________________________________________  DISCHARGE MEDICATIONS:   Current Discharge Medication List          My Recommended Diet, Activity, Wound Care, and follow-up labs are listed in the patient's Discharge Insturctions which I have personally completed and reviewed.     _______________________________________________________________________  DISPOSITION:    Home with Family:    Home with HH/PT/OT/RN: x   SNF/LTC:    PAULA:    OTHER:        Condition at Discharge:  Stable  _______________________________________________________________________  Follow up with:   PCP : Tam Lyons MD  Follow-up Information     Follow up With Details Comments Jackie Ji Anusha Lyons MD Go on 11/9/2017 PCP follow up at 11:10 AM [address]  [address]  [phone]      Lisandra Casarez MD In 1 week hospital f/u  [address] [place] [address] [phone]                Total time in minutes spent coordinating this discharge (includes going over instructions, follow-up, prescriptions, and preparing report for sign off to her PCP) :  60 minutes    Signed:  Cal Stover MD

## 2017-11-06 NOTE — INTERDISCIPLINARY ROUNDS
Bedside interdisciplinary rounds were held today to discuss patient plan of care and outcomes. The following members were present: [address] [place] [address], Pharmacy, and Case Management. Plan:  Continue Cipro waiting for final ID recommendations. Per notes, Dr. Cristy Mckee to discuss case with Dr. Tyree Neves for DVT prevention.

## 2017-11-06 NOTE — ROUTINE PROCESS
Bedside and Verbal shift change report given to JUNOIR Briseno (oncoming nurse) by Nelia Hoyt RN (offgoing nurse). Report included the following information SBAR, Kardex, Intake/Output, MAR, Recent Results and Med Rec Status.

## 2017-11-06 NOTE — PROGRESS NOTES
Ms Clyde Damicos seen today. Says she is feeling ok. Denied diarrhea fever, chills, nausea, vomiting. + L foot lateral pain. ROS: 10 point ROS obtained and pertinent positives include above All others negative.      ¨ GEN: NAD  ¨ HEENT:  no scleral icterus, no thrush  ¨ CV: S1, S2 heard regularly  ¨ Lungs: Clear to auscultation bilaterally  ¨ Extremities: +  L lateral foot ulcer noted, no discharge or foul odor, no edema RLE noted , no erythema around the wound  ¨ Neuro: Alert and alert,  moves all extremities to commands, verbal   ¨ Skin: no rash  ¨ Psych: good affect, good eye contact, non tearful   ¨ Lines:  Peripheral   ¨ Abdomen: soft, non distended, non tender,  ¨ Genitourinary:no stover        Vitals:   Patient Vitals for the past 24 hrs:   Temp Pulse Resp BP SpO2   11/06/17 1025 98.6 °F (37 °C) 87 18 138/85 94 %   11/06/17 0640 98.6 °F (37 °C) 83 18 152/78 97 %   11/06/17 0249 98.2 °F (36.8 °C) 80 18 151/66 96 %   11/05/17 2203 98.4 °F (36.9 °C) 82 16 139/73 97 %   11/05/17 1803 98 °F (36.7 °C) 78 16 155/86 98 %   11/05/17 1456 98.5 °F (36.9 °C) 86 16 136/80 98 %       Medications:    Current Facility-Administered Medications:     [START ON 11/7/2017] insulin glargine (LANTUS) injection 18 Units, 18 Units, SubCUTAneous, DAILY, Beverly Lam MD    morphine 1 mg, 1 mg, IntraVENous, Q4H PRN, Beverly Lam MD, 1 mg at 11/06/17 1053    ciprofloxacin (CIPRO) 400 mg IVPB (premix), 400 mg, IntraVENous, Q8H, Arik Warren MD, Last Rate: 200 mL/hr at 11/06/17 0811, 400 mg at 11/06/17 0811    collagenase (SANTYL) 250 unit/gram ointment, , Topical, DAILY, Jordan Woodward MD    aspirin delayed-release tablet 81 mg, 81 mg, Oral, DAILY, Ketty Cowart MD, 81 mg at 11/06/17 0924    ibuprofen (MOTRIN) tablet 600 mg, 600 mg, Oral, Q6H PRN, Ketty Cowart MD, 600 mg at 11/05/17 1853    lisinopril (PRINIVIL, ZESTRIL) tablet 20 mg, 20 mg, Oral, QHS, Ketty Cowart MD, 20 mg at 11/05/17 4698   oxybutynin chloride XL (DITROPAN XL) tablet 15 mg, 15 mg, Oral, QPM, Eloise Jimenez MD, 15 mg at 11/05/17 1758    oxyCODONE-acetaminophen (PERCOCET) 5-325 mg per tablet 1 Tab, 1 Tab, Oral, Q4H PRN, Eloise Jimenez MD, 1 Tab at 11/06/17 0819    pravastatin (PRAVACHOL) tablet 40 mg, 40 mg, Oral, DAILY, Eloise Jimenez MD, 40 mg at 11/06/17 0924    rOPINIRole (REQUIP) tablet 0.5 mg, 0.5 mg, Oral, QHS, Eloise Jimenez MD, 0.5 mg at 11/05/17 2119    sodium chloride (NS) flush 5-10 mL, 5-10 mL, IntraVENous, Q8H, Eloise Jimenez MD, 10 mL at 11/06/17 0811    sodium chloride (NS) flush 5-10 mL, 5-10 mL, IntraVENous, PRN, Eloise Jimenez MD, 10 mL at 11/03/17 0324    acetaminophen (TYLENOL) tablet 650 mg, 650 mg, Oral, Q6H PRN, Eloise Jimenez MD    ondansetron (ZOFRAN) injection 4 mg, 4 mg, IntraVENous, Q4H PRN, Eloise Jimenez MD    enoxaparin (LOVENOX) injection 40 mg, 40 mg, SubCUTAneous, Q24H, Eloise Jimenez MD, 40 mg at 11/05/17 1759    polyethylene glycol (MIRALAX) packet 17 g, 17 g, Oral, DAILY, Eloise Jimenez MD, Stopped at 11/02/17 0900    senna-docusate (PERICOLACE) 8.6-50 mg per tablet 1 Tab, 1 Tab, Oral, DAILY PRN, Eloise Jimenez MD    hydrALAZINE (APRESOLINE) 20 mg/mL injection 10 mg, 10 mg, IntraVENous, Q6H PRN, Eloise Jimenez MD    insulin lispro (HUMALOG) injection, , SubCUTAneous, AC&HS, Eloise Jimenez MD, 5 Units at 11/06/17 0814    glucose chewable tablet 16 g, 4 Tab, Oral, PRN, Eloise Jimenez MD    dextrose (D50W) injection syrg 12.5-25 g, 12.5-25 g, IntraVENous, PRN, Eloise Jimenez MD    glucagon (GLUCAGEN) injection 1 mg, 1 mg, IntraMUSCular, PRN, Eloise Jimenez MD      Labs:  Recent Results (from the past 24 hour(s))   CBC WITH AUTOMATED DIFF    Collection Time: 11/05/17 11:26 AM   Result Value Ref Range    WBC 9.6 3.6 - 11.0 K/uL    RBC 3.92 3.80 - 5.20 M/uL    HGB 11.0 (L) 11.5 - 16.0 g/dL    HCT 33.6 (L) 35.0 - 47.0 %    MCV 85.7 80.0 - 99.0 FL    MCH 28.1 26.0 - 34.0 PG    MCHC 32.7 30.0 - 36.5 g/dL    RDW 14.2 11.5 - 14.5 %    PLATELET 934 045 - 110 K/uL    NEUTROPHILS 62 32 - 75 %    LYMPHOCYTES 30 12 - 49 %    MONOCYTES 5 5 - 13 %    EOSINOPHILS 3 0 - 7 %    BASOPHILS 0 0 - 1 %    ABS. NEUTROPHILS 5.9 1.8 - 8.0 K/UL    ABS. LYMPHOCYTES 2.9 0.8 - 3.5 K/UL    ABS. MONOCYTES 0.5 0.0 - 1.0 K/UL    ABS. EOSINOPHILS 0.3 0.0 - 0.4 K/UL    ABS. BASOPHILS 0.0 0.0 - 0.1 K/UL   METABOLIC PANEL, COMPREHENSIVE    Collection Time: 11/05/17 11:26 AM   Result Value Ref Range    Sodium 139 136 - 145 mmol/L    Potassium 3.9 3.5 - 5.1 mmol/L    Chloride 106 97 - 108 mmol/L    CO2 27 21 - 32 mmol/L    Anion gap 6 5 - 15 mmol/L    Glucose 254 (H) 65 - 100 mg/dL    BUN 13 6 - 20 MG/DL    Creatinine 0.57 0.55 - 1.02 MG/DL    BUN/Creatinine ratio 23 (H) 12 - 20      GFR est AA >60 >60 ml/min/1.73m2    GFR est non-AA >60 >60 ml/min/1.73m2    Calcium 8.3 (L) 8.5 - 10.1 MG/DL    Bilirubin, total 0.3 0.2 - 1.0 MG/DL    ALT (SGPT) 57 12 - 78 U/L    AST (SGOT) 37 15 - 37 U/L    Alk.  phosphatase 80 45 - 117 U/L    Protein, total 6.7 6.4 - 8.2 g/dL    Albumin 3.3 (L) 3.5 - 5.0 g/dL    Globulin 3.4 2.0 - 4.0 g/dL    A-G Ratio 1.0 (L) 1.1 - 2.2     GLUCOSE, POC    Collection Time: 11/05/17 11:28 AM   Result Value Ref Range    Glucose (POC) 267 (H) 65 - 100 mg/dL    Performed by Felice Burns    GLUCOSE, POC    Collection Time: 11/05/17  4:01 PM   Result Value Ref Range    Glucose (POC) 313 (H) 65 - 100 mg/dL    Performed by [address], POC    Collection Time: 11/05/17  9:05 PM   Result Value Ref Range    Glucose (POC) 266 (H) 65 - 100 mg/dL    Performed by Vinod Yu (PCT)    GLUCOSE, POC    Collection Time: 11/06/17  7:40 AM   Result Value Ref Range    Glucose (POC) 254 (H) 65 - 100 mg/dL    Performed by [employer]            Micro:   Special Requests: NO SPECIAL REQUESTS   Final      GRAM STAIN FEW WBCS SEEN   Final     GRAM STAIN NO ORGANISMS SEEN   Final     Culture result:    Final     FEW PSEUDOMONAS AERUGINOSA (A)       Culture & Susceptibility        Antibiotic   Organism Organism Organism       Pseudomonas aeruginosa       AMIKACIN ($)   <=16   ug/mL   S Final         AZTREONAM ($$$$)   <=4   ug/mL   S Final         CEFEPIME ($$)   <=4   ug/mL   S Final         CEFTAZIDIME ($)   4   ug/mL   S Final         CIPROFLOXACIN ($)   <=1   ug/mL   S Final         GENTAMICIN ($)   <=4   ug/mL   S Final         IMIPENEM   <=1   ug/mL   S Final         LEVOFLOXACIN ($)   <=2   ug/mL   S Final         MEROPENEM ($$)   <=1   ug/mL   S Final         PIPERACILLIN/TAZOBAC ($)   <=16   ug/mL   S Final         TOBRAMYCIN ($)   <=4   ug/mL   S Final                        Component Value Ref Range & Units Status        Special Requests: NO SPECIAL REQUESTS    Final      GRAM STAIN 4+   GRAM POSITIVE COCCI      Final      GRAM STAIN FEW   GRAM POSITIVE RODS   (CORYNEFORM)      Final      GRAM STAIN FEW   GRAM NEGATIVE RODS      Final      Culture result: HEAVY   ESCHERICHIA COLI      Final      Culture result: HEAVY   MIXED SKIN MARKIE ISOLATED      Final          06/2016 Wound culture    Culture & Susceptibility                     Antibiotic    Organism Organism Organism          Escherichia coli          AMIKACIN ($)    <=16   ug/mL   S Final              AMPICILLIN ($)    >16   ug/mL   R Final              AMPICILLIN/SULBACTAM ($)    >16/8   ug/mL   R Final              AZTREONAM ($$$$)    >16   ug/mL   R Final              CEFAZOLIN ($)    >16   ug/mL   R Final              CEFEPIME ($$)    <=4   ug/mL   S Final              CEFOTAXIME    16   ug/mL   I Final              CEFTAZIDIME ($)    >16   ug/mL   R Final              CEFTRIAXONE ($)    32   ug/mL   R Final              CEFUROXIME ($)    >16   ug/mL   R Final              CIPROFLOXACIN ($)    <=1   ug/mL   S Final              GENTAMICIN ($)    <=4   ug/mL   S Final              IMIPENEM    <=1   ug/mL   S Final            LEVOFLOXACIN ($)    <=2   ug/mL   S Final              MEROPENEM ($$)    <=1   ug/mL   S Final              PIPERACILLIN/TAZOBAC ($)    >64   ug/mL   R Final              TOBRAMYCIN ($)    <=4   ug/mL   S Final              TRIMETH-SULFAMETHOXA    >2/38   ug/mL   R Final                                          03/2016 Wound          Special Requests: NO SPECIAL REQUESTS    Final        GRAM STAIN NO ORGANISMS SEEN    Final      Culture result: LIGHT   STAPHYLOCOCCUS AUREUS      Final      Culture result: SCANT   ENTEROCOCCUS FAECALIS GROUP D      Final        Culture & Susceptibility                     Antibiotic    Organism Organism Organism            Enterococcus faecalis group D        AMPICILLIN ($)        <=2   ug/mL   S Final          AMPICILLIN/SULBACTAM ($) STAPHYLOCOCCUS AUREUS  <=8/4   ug/mL   S Final              CEFAZOLIN ($) STAPHYLOCOCCUS AUREUS  <=4   ug/mL   S Final              CIPROFLOXACIN ($) STAPHYLOCOCCUS AUREUS  <=1   ug/mL   S Final              CLINDAMYCIN ($) STAPHYLOCOCCUS AUREUS  <=0.5   ug/mL   S Final              DAPTOMYCIN ($$$$$)        2   ug/mL   S Final          DAPTOMYCIN ($$$$$) STAPHYLOCOCCUS AUREUS  <=0.5   ug/mL   S Final              ERYTHROMYCIN ($$$$) STAPHYLOCOCCUS AUREUS  <=0.5   ug/mL   S Final              GENTAMICIN ($) STAPHYLOCOCCUS AUREUS  <=4   ug/mL   S Final              LINEZOLID ($$$$$)        2   ug/mL   S Final          LINEZOLID ($$$$$) STAPHYLOCOCCUS AUREUS  2   ug/mL   S Final              OXACILLIN STAPHYLOCOCCUS AUREUS  <=0.25   ug/mL   S Final              PENICILLIN G ($$)        2   ug/mL   S Final          RIFAMPIN ($$$$) STAPHYLOCOCCUS AUREUS  <=1   ug/mL   S Final              TETRACYCLINE STAPHYLOCOCCUS AUREUS  <=4   ug/mL   S Final              TRIMETH-SULFAMETHOXA STAPHYLOCOCCUS AUREUS  <=0.5/9.5   ug/mL   S Final              VANCOMYCIN ($)        2   ug/mL   S Final          VANCOMYCIN ($) STAPHYLOCOCCUS AUREUS  2   ug/mL   S Final                                              Comments          Organism: Enterococcus faecalis group D Antibiotic: AMPICILLIN ($)      Method: PERI                       Organism:  (STAPHYLOCOCCUS AUREUS) Antibiotic: AMPICILLIN/SULBACTAM ($)      Method: PERI                       Organism:  (STAPHYLOCOCCUS AUREUS) Antibiotic: CEFAZOLIN ($)      Method: PERI                       Organism:  (STAPHYLOCOCCUS AUREUS) Antibiotic: CIPROFLOXACIN ($)      Method: PERI                       Organism:  (STAPHYLOCOCCUS AUREUS) Antibiotic: CLINDAMYCIN ($)      Method: PERI                       Organism: Enterococcus faecalis group D Antibiotic: DAPTOMYCIN ($$$$$)      Method: PERI                       Organism:  (STAPHYLOCOCCUS AUREUS) Antibiotic: DAPTOMYCIN ($$$$$)      Method: PERI                       Organism:  (STAPHYLOCOCCUS AUREUS) Antibiotic: ERYTHROMYCIN ($$$$)      Method: PERI                       Organism:  (STAPHYLOCOCCUS AUREUS) Antibiotic: GENTAMICIN ($)      Method: PERI                       Organism: Enterococcus faecalis group D Antibiotic: LINEZOLID ($$$$$)      Method: PERI                       Organism:  (STAPHYLOCOCCUS AUREUS) Antibiotic: LINEZOLID ($$$$$)      Method: PERI                       Organism:  (STAPHYLOCOCCUS AUREUS) Antibiotic: OXACILLIN      Method: PERI                       Organism: Enterococcus faecalis group D Antibiotic: PENICILLIN G ($$)      Method: PERI                       Organism:  (STAPHYLOCOCCUS AUREUS) Antibiotic: RIFAMPIN ($$$$)      Method: PERI      Comment: Rifampin is not to be used for mono-therapy.                       Organism:  (STAPHYLOCOCCUS AUREUS) Antibiotic: TETRACYCLINE      Method: PERI                       Organism:  (STAPHYLOCOCCUS AUREUS) Antibiotic: TRIMETH-SULFAMETHOXA      Method: PERI                       Organism: Enterococcus faecalis group D Antibiotic: VANCOMYCIN ($)       Component Value Ref Range & Units Status        Special Requests: NO SPECIAL REQUESTS    Final      GRAM STAIN 4+   GRAM POSITIVE COCCI      Final      GRAM STAIN FEW   GRAM POSITIVE RODS   (CORYNEFORM)      Final      GRAM STAIN FEW   GRAM NEGATIVE RODS      Final      Culture result: HEAVY   ESCHERICHIA COLI      Final      Culture result: HEAVY   MIXED SKIN MARKIE ISOLATED      Final          06/2016 Wound culture    Culture & Susceptibility                     Antibiotic    Organism Organism Organism          Escherichia coli          AMIKACIN ($)    <=16   ug/mL   S Final              AMPICILLIN ($)    >16   ug/mL   R Final              AMPICILLIN/SULBACTAM ($)    >16/8   ug/mL   R Final              AZTREONAM ($$$$)    >16   ug/mL   R Final              CEFAZOLIN ($)    >16   ug/mL   R Final              CEFEPIME ($$)    <=4   ug/mL   S Final              CEFOTAXIME    16   ug/mL   I Final              CEFTAZIDIME ($)    >16   ug/mL   R Final              CEFTRIAXONE ($)    32   ug/mL   R Final              CEFUROXIME ($)    >16   ug/mL   R Final              CIPROFLOXACIN ($)    <=1   ug/mL   S Final              GENTAMICIN ($)    <=4   ug/mL   S Final              IMIPENEM    <=1   ug/mL   S Final              LEVOFLOXACIN ($)    <=2   ug/mL   S Final              MEROPENEM ($$)    <=1   ug/mL   S Final              PIPERACILLIN/TAZOBAC ($)    >64   ug/mL   R Final              TOBRAMYCIN ($)    <=4   ug/mL   S Final              TRIMETH-SULFAMETHOXA    >2/38   ug/mL   R Final                                          03/2016 Wound          Special Requests: NO SPECIAL REQUESTS    Final        GRAM STAIN NO ORGANISMS SEEN    Final      Culture result: LIGHT   STAPHYLOCOCCUS AUREUS      Final      Culture result: SCANT   ENTEROCOCCUS FAECALIS GROUP D      Final        Culture & Susceptibility                     Antibiotic    Organism Organism Organism            Enterococcus faecalis group D        AMPICILLIN ($)        <=2   ug/mL   S Final          AMPICILLIN/SULBACTAM ($) STAPHYLOCOCCUS AUREUS  <=8/4   ug/mL   S Final              CEFAZOLIN ($) STAPHYLOCOCCUS AUREUS  <=4   ug/mL   S Final              CIPROFLOXACIN ($) STAPHYLOCOCCUS AUREUS  <=1   ug/mL   S Final              CLINDAMYCIN ($) STAPHYLOCOCCUS AUREUS  <=0.5   ug/mL   S Final              DAPTOMYCIN ($$$$$)        2   ug/mL   S Final          DAPTOMYCIN ($$$$$) STAPHYLOCOCCUS AUREUS  <=0.5   ug/mL   S Final              ERYTHROMYCIN ($$$$) STAPHYLOCOCCUS AUREUS  <=0.5   ug/mL   S Final              GENTAMICIN ($) STAPHYLOCOCCUS AUREUS  <=4   ug/mL   S Final              LINEZOLID ($$$$$)        2   ug/mL   S Final          LINEZOLID ($$$$$) STAPHYLOCOCCUS AUREUS  2   ug/mL   S Final              OXACILLIN STAPHYLOCOCCUS AUREUS  <=0.25   ug/mL   S Final              PENICILLIN G ($$)        2   ug/mL   S Final          RIFAMPIN ($$$$) STAPHYLOCOCCUS AUREUS  <=1   ug/mL   S Final              TETRACYCLINE STAPHYLOCOCCUS AUREUS  <=4   ug/mL   S Final              TRIMETH-SULFAMETHOXA STAPHYLOCOCCUS AUREUS  <=0.5/9.5   ug/mL   S Final              VANCOMYCIN ($)        2   ug/mL   S Final          VANCOMYCIN ($) STAPHYLOCOCCUS AUREUS  2   ug/mL   S Final                                              Comments          Organism: Enterococcus faecalis group D Antibiotic: AMPICILLIN ($)      Method: PERI                       Organism:  (STAPHYLOCOCCUS AUREUS) Antibiotic: AMPICILLIN/SULBACTAM ($)      Method: PERI                       Organism:  (STAPHYLOCOCCUS AUREUS) Antibiotic: CEFAZOLIN ($)      Method: PERI                       Organism:  (STAPHYLOCOCCUS AUREUS) Antibiotic: CIPROFLOXACIN ($)      Method: PERI                       Organism:  (STAPHYLOCOCCUS AUREUS) Antibiotic: CLINDAMYCIN ($)      Method: PERI                       Organism: Enterococcus faecalis group D Antibiotic: DAPTOMYCIN ($$$$$)      Method: PERI                       Organism:  (STAPHYLOCOCCUS AUREUS) Antibiotic: DAPTOMYCIN ($$$$$)      Method: PERI                       Organism:  (STAPHYLOCOCCUS AUREUS) Antibiotic: ERYTHROMYCIN ($$$$)      Method: PERI                       Organism:  (STAPHYLOCOCCUS AUREUS) Antibiotic: GENTAMICIN ($)      Method: PERI                       Organism: Enterococcus faecalis group D Antibiotic: LINEZOLID ($$$$$)      Method: PERI                       Organism:  (STAPHYLOCOCCUS AUREUS) Antibiotic: LINEZOLID ($$$$$)      Method: PERI                       Organism:  (STAPHYLOCOCCUS AUREUS) Antibiotic: OXACILLIN      Method: PERI                       Organism: Enterococcus faecalis group D Antibiotic: PENICILLIN G ($$)      Method: PERI                       Organism:  (STAPHYLOCOCCUS AUREUS) Antibiotic: RIFAMPIN ($$$$)      Method: PERI      Comment: Rifampin is not to be used for mono-therapy.                       Organism:  (STAPHYLOCOCCUS AUREUS) Antibiotic: TETRACYCLINE      Method: PERI                       Organism:  (STAPHYLOCOCCUS AUREUS) Antibiotic: TRIMETH-SULFAMETHOXA      Method: PERI                       Organism: Enterococcus faecalis group D Antibiotic: VANCOMYCIN ($)      Method: PERI                       Organism:  (STAPHYLOCOCCUS AUREUS) Antibiotic: VANCOMYCIN ($)      Method: PERI         Method: PERI                       Organism:  (STAPHYLOCOCCUS AUREUS) Antibiotic: VANCOMYCIN ($)      Method: PERI            Imaging:   X ray  FINDINGS:  Three views of the left foot demonstrate a large lateral soft tissue  ulceration.  Underlying erosive changes are seen in the lateral tarsal joints  and talus.      IMPRESSION  IMPRESSION:  Large lateral soft tissue ulceration with underlying erosive  changes concerning for acute osteomyelitis.      MRI foot 3/2016  FINDINGS: The second toe crosses over the first.      Bone marrow: within normal limits. No fracture, dislocation, or marrow   replacing process. No evidence of stress reaction or periostitis.  No   evidence of osteomyelitis.      Joint fluid:  No significant joint effusion.      Tendons: Intact.      Muscles: Within normal limits.      Neurovascular bundles: Within normal limits.      Articular cartilage:Intact without focal osteochondral lesion. No evidence   of erosions.      Soft tissues: There is extensive edema over the dorsum of the foot. No   evidence of a focal drainable fluid collection. No evidence of a mass.          IMPRESSION:   Extensive edema over the dorsum of the foot without evidence of a focal   drainable fluid collection. No evidence of osteomyelitis.     MRI tib/fib 3/2016  FINDINGS: Bone marrow: No bone marrow edema. No evidence of osteomyelitis. Midfoot medial angulation is unchanged.      Joint fluid:  Small tibiotalar and subtalar joint effusions are unchanged.      Tendons: Absent versus diminutive peroneus brevis tendon is unchanged. Anterior dislocation of the peroneus longus tendon is unchanged. No new   tendon pathology.      Muscles: Edema is unchanged.      Osteoarthritis is unchanged.      Soft tissue mass: No pathologically enhancing mass or drainable fluid   collection. Edema in the subcutaneous adipose tissues is not significantly   changed since last year.          IMPRESSION:   1. Unchanged evidence of cellulitis. No abscess. 2. No osteomyelitis. 3. Unchanged mild myositis.        Procedures: Aortogram wt run off L foot 11/2     Assessment / Plan:      Ms Haroon Burton is a 59y.o. year old lady who says she was born with \"polio\" and cerebral palsy, DM, HTN, and crohns who has had a non healing chronic Left foot ulcer ( for about 2 years). She says she has B/L LE bone transplants per her done when was around 13 yrs of age. Says that she has had several surgeries done to her LE and in past had hardware that has since been removed. She recalls being on PO Cephalexin about 3 times in the past year for cellulitis. Admitted after being seen in clinic for cellulitis and initially treated wt Vancomycin and Zosyn  MRI done that showed no osteomyelitis.  Wound cx + for Pseudomonas.     She also had an Aortogram done 11/2 to assess PAD and is being followed for possible future intervention to improve blood flow.      Past wound cultures have grown MSSA, Ecoli and Ampicillin Enterococcus. However, site not specified.    1) Left foot chronic ulcer non healing ulcer. MRI negative  Unclear if there is any tissue to debride as well and discussed wt surgeon  Would recommend renally dosed Levaquin for about 10 more days  (750 mgQD if  Normal renal fxn)  Of most importance is good wound care, optimization of DM, smoking cessation  Discussed wt Dr Ryan Jean and possibly there are plans for optimizing PAD wt interventions in future  Discussed wt Ms Yari Quijano and her  that recurrent infectious risk remains high wt the above challenges  Discussed about PO meds absorption as she has a hx of Crohns. She denied any current GI symptoms but if unable to take PO and keep it down /absorption issues, route of abx delivery needs to be revisited  Discussed about side effects of quinolones including tendon rupture, C diff risk, GI upset and drug drug interactions that prolong QT interval  I have given them my contact information if any questions or issues arise to reach me       2)  DM per primary team      3) Hx of cerebral palsy      4) HTN     5) DVT Px           ID will sign off. Thank you for the opportunity to participate in the care of this patient. Please contact with questions or concerns.       Jia Villareal,    11:17 AM

## 2017-11-06 NOTE — PROGRESS NOTES
Bedside shift change report given to Ean RN (oncoming nurse) by Suzanna RN (offgoing nurse). Report included the following information SBAR, Kardex, ED Summary, Procedure Summary, Intake/Output, MAR and Recent Results.

## 2017-11-06 NOTE — PROGRESS NOTES
Dr Cliff Patterson removed patient dressing. Orders to dress with santyl, telfa, foam and gauze ( deandra). Telfa not stocked at [facility]. Call OR and was told to call material management. Dr. Cliff Patterson aware. Verbal from Dr. Cliff Patterson not dress wound until we have the telfa. Materials management notified and placed order for telfa. Material management will notify nurse when dressing arrives. Patient refuse to have any loose dressing cover to prevent infection at this time.

## 2017-11-06 NOTE — PROGRESS NOTES
[phone]: Paged Dr Jeovany Mckeon. 1050: Received a call back. Informed Dr Jeovany Mckeon that patient was having 10/10 pain 1 hour after receiving percocet.  Dr Jeovany Mckeon stated she would put in for some IV pain medication

## 2017-11-06 NOTE — DIABETES MGMT
DTC Progress Note    Recommendations/ Comments: Please consider adding humalog 5units ac tid for prandial coverage. Would continue to titrate lantus as needed for elevated fasting BG and titrate prandial humalog as needed for elevated daytime BG. Chart reviewed on [facility]. Patient is a 59 y.o. female with known history of Type 2 Diabetes on Metformin 1,000mg bid, Actos 30mg daily, and Toujeo 35 units bid (takes when MD has samples available) at home. A1c:   Lab Results   Component Value Date/Time    Hemoglobin A1c 10.5 10/03/2017 01:49 PM    Hemoglobin A1c 10.3 07/05/2017 04:29 PM       Recent Glucose Results:   Lab Results   Component Value Date/Time     (H) 11/05/2017 11:26 AM    GLUCPOC 230 (H) 11/06/2017 11:04 AM    GLUCPOC 254 (H) 11/06/2017 07:40 AM    GLUCPOC 266 (H) 11/05/2017 09:05 PM        Lab Results   Component Value Date/Time    Creatinine 0.57 11/05/2017 11:26 AM     Estimated Creatinine Clearance: 88.4 mL/min (based on Cr of 0.57). Active Orders   Diet    DIET DIABETIC CONSISTENT CARB Regular        PO intake:   Patient Vitals for the past 72 hrs:   % Diet Eaten   11/05/17 1806 100 %   11/05/17 1456 100 %   11/05/17 0844 100 %   11/03/17 1844 100 %   11/03/17 1508 100 %       Current hospital DM medication:   -humalog normal sensitivity correction  -lantus 18units daily- increased for tomorrow    Will continue to follow as needed.     Thank you  Gema Garcia, PHANN, RN, [id number] [facility]

## 2017-11-06 NOTE — PROGRESS NOTES
Pharmacy Automatic Renal Dosing Protocol - Antimicrobials    Indication for Antimicrobials: Non-healing DM foot w/ LLE cellulitis & concern for OM     Current Regimen of Each Antimicrobial:  Levofloxacin 750mg po daily; start ; Day #1    Previous Antimicrobial Therapy:  Ceftriaxone 2gm x1  Vancomycin 750mg IV q12h - Started  10/31; Day # 5  Zosyn 3.375gm IV q8h; Started 10/31; Day #5  Ciprofloxacin 400mg IV q8h; start ; Day #3      Significant Cultures:   10/31: Blood: No Growth (FINAL)  10/31: Wound: few pseudomonas; sens Zosyn (FINAL)  10/31: XRay L Ft: Large lateral soft tissue ulceration with underlying erosive  changes concerning for acute osteomyelitis. : MRI Left Foot: No evidence for abscess or osteomyelitis. Paralysis, amputations, malnutrition: Polio/Post Polio Syndrome, Cerebral Palsy,     Labs:  Recent Labs      17   1126  17   0029   CREA  0.57  0.96   BUN  13  16   WBC  9.6   --      Temp (24hrs), Av.4 °F (36.9 °C), Min:98 °F (36.7 °C), Max:98.6 °F (37 °C)      Creatinine Clearance (mL/min) or Dialysis: 59    Impression/Plan:   - Per ID; change Ciprofloxacin to Levofloxacin x10 more days (no stop date ordered)  - Continue wound care     Pharmacy will follow daily and adjust medications as appropriate for renal function and/or serum levels. Thank you,  Estella Adames [facility]          Recommended duration of therapy  http://[facility]/[place]/[facility]/Pharmacy/Clinical%20Companion/Duration%20of%20ABX%20therapy. docx    Renal Dosing  http://[facility]/[place]/[facility]/Pharmacy/Clinical%20Companion/Renal%[id number]. pdf

## 2017-11-06 NOTE — PROGRESS NOTES
Surgery    Pain with dressing change. Afebrile, VSS. Left leg erythema resolved. Left foot wound improved compared to at  admission. No visible or palpable bone. MRI negative for osteo or abscess. Wound culture grew pseudomonas. Now only on Cipro IV. I will discuss antibiotic planning with ID. I will be discussing management of aortoiliac arterial disease with Dr Renae Josue.     Maine Hensley MD

## 2017-11-06 NOTE — PROGRESS NOTES
Hospitalist Progress Note    NAME: Rizwana Houston   :  1960   MRN:  [id number]       Assessment / Plan:  Nonhealing diabetic foot ulcer with LLE cellulitis in the setting of PAD and uncontrolled diabetes  MRI negative for OM  -XR with  \"Large lateral soft tissue ulceration with underlying erosive  changes concerning for acute osteomyelitis. \"  MRI with no abscess/OM  -Afebrile without leukocytosis. Lactic wnl.  -Received IV vanc and ceftriaxone, switched to cipro iv  by ID. Wound cx with pseudomonas, blood cultures negative so far. -Appreciate ID consult, see ID note, rec Po levaquin for 10 days  -wound care.   -Pain control. -ESR 21, CRP 0.69  -Appreciate Surgery consult.   -Arterial doppler of LE with PAD, s/p angiogram on  with Significant aortoiliac inflow disease. Fair runoff vasculature. Vascular surgery consult, D/w Dr. Servando Somers, ok to discharge with OP follow-up in 1 week       DM II, uncontrolled  -A1C 10.5 10/3/2017  -hold pta actos and metformin  -accuchecks with SSI, started lantus , increased , monitor titrate up as indicated  - DTC education  Resume home meds on discharge     HTN  -pta lisinopril     HLD  -pta statin     Hx of polio with residual BLE weakness  -pta requip     Tobacco use  -more than 1 PPD  -refuses nicotine patch        Code Status: full  Surrogate Decision Maker:   DVT Prophylaxis: lovenox  GI Prophylaxis: not indicated  Baseline: WC bound. Dispo: Home with HH, wound care      Body mass index is 32.51 kg/(m^2). Subjective:     Chief Complaint / Reason for Physician Visit  \"foot pain\". Discussed with RN events overnight.      Review of Systems:  Symptom Y/N Comments  Symptom Y/N Comments   Fever/Chills n   Chest Pain n    Poor Appetite    Edema     Cough n   Abdominal Pain n    Sputum    Joint Pain     SOB/DARLING n   Pruritis/Rash     Nausea/vomit n   Tolerating PT/OT     Diarrhea n   Tolerating Diet     Constipation    Other       Could NOT obtain due to:      Objective:     VITALS:   Last 24hrs VS reviewed since prior progress note. Most recent are:  Patient Vitals for the past 24 hrs:   Temp Pulse Resp BP SpO2   11/06/17 1025 98.6 °F (37 °C) 87 18 138/85 94 %   11/06/17 0640 98.6 °F (37 °C) 83 18 152/78 97 %   11/06/17 0249 98.2 °F (36.8 °C) 80 18 151/66 96 %   11/05/17 2203 98.4 °F (36.9 °C) 82 16 139/73 97 %   11/05/17 1803 98 °F (36.7 °C) 78 16 155/86 98 %   11/05/17 1456 98.5 °F (36.9 °C) 86 16 136/80 98 %       Intake/Output Summary (Last 24 hours) at 11/06/17 1318  Last data filed at 11/06/17 0810   Gross per 24 hour   Intake             1860 ml   Output              150 ml   Net             1710 ml        PHYSICAL EXAM:  General: WD, WN. Alert, cooperative, no acute distress, sitting in chair  EENT:  EOMI. Anicteric sclerae. MMM  Resp:  CTA bilaterally, no wheezing or rales. No accessory muscle use  CV:  Regular  rhythm,  No edema  GI:  Soft, Non distended, Non tender.  +Bowel sounds  Neurologic:  Alert and oriented X 3, normal speech, 4/5 weakness LE chronic  Psych:   Good insight. Not anxious nor agitated  Skin:  No rashes. No jaundice, Erythema better, warmth of LLE, less edema dressing left foot     Reviewed most current lab test results and cultures  YES  Reviewed most current radiology test results   YES  Review and summation of old records today    NO  Reviewed patient's current orders and MAR    YES  PMH/ reviewed - no change compared to H&P  ________________________________________________________________________  Care Plan discussed with:    Comments   Patient x    Family  x  in room   RN x    Care Manager     Consultant  X ID, surgery                     Multidiciplinary team rounds were held today with , nursing, pharmacist and clinical coordinator. Patient's plan of care was discussed; medications were reviewed and discharge planning was addressed. ________________________________________________________________________  Total NON critical care TIME:  25   Minutes    Total CRITICAL CARE TIME Spent:   Minutes non procedure based      Comments   >50% of visit spent in counseling and coordination of care     ________________________________________________________________________  Odette Bowman MD     Procedures: see electronic medical records for all procedures/Xrays and details which were not copied into this note but were reviewed prior to creation of Plan. LABS:  I reviewed today's most current labs and imaging studies.   Pertinent labs include:  Recent Labs      11/05/17   1126   WBC  9.6   HGB  11.0*   HCT  33.6*   PLT  292     Recent Labs      11/05/17   1126  11/04/17   0029   NA  139  140   K  3.9  3.6   CL  106  106   CO2  27  22   GLU  254*  331*   BUN  13  16   CREA  0.57  0.96   CA  8.3*  8.4*   ALB  3.3*   --    TBILI  0.3   --    SGOT  37   --    ALT  57   --        Signed: Odette Bowman MD

## 2017-11-06 NOTE — ROUTINE PROCESS
Bedside and Verbal shift change report given to JUNIOR Briseno (oncoming nurse) by   Gibson Boland RN (offgoing nurse). Report included the following information SBAR, Kardex, Intake/Output, MAR, Recent Results and Med Rec Status.

## 2017-11-07 ENCOUNTER — HOME CARE VISIT (OUTPATIENT)
Dept: SCHEDULING | Facility: HOME HEALTH | Age: 57
End: 2017-11-07
Payer: COMMERCIAL

## 2017-11-07 PROCEDURE — G0299 HHS/HOSPICE OF RN EA 15 MIN: HCPCS

## 2017-11-07 RX ORDER — IBUPROFEN 800 MG/1
800 TABLET ORAL
Qty: 30 TAB | Refills: 2 | Status: SHIPPED | OUTPATIENT
Start: 2017-11-07 | End: 2018-02-04 | Stop reason: SDUPTHER

## 2017-11-07 RX ORDER — OXYCODONE AND ACETAMINOPHEN 5; 325 MG/1; MG/1
1 TABLET ORAL
Qty: 45 TAB | Refills: 0 | Status: SHIPPED | OUTPATIENT
Start: 2017-11-07 | End: 2017-12-07 | Stop reason: SDUPTHER

## 2017-11-07 RX ORDER — ROPINIROLE 0.5 MG/1
0.5 TABLET, FILM COATED ORAL
Qty: 30 TAB | Refills: 5 | Status: SHIPPED | OUTPATIENT
Start: 2017-11-07 | End: 2018-02-11 | Stop reason: SDUPTHER

## 2017-11-07 RX ORDER — METFORMIN HYDROCHLORIDE 1000 MG/1
1000 TABLET ORAL 2 TIMES DAILY WITH MEALS
Qty: 60 TAB | Refills: 5 | Status: SHIPPED | OUTPATIENT
Start: 2017-11-07

## 2017-11-07 RX ORDER — PRAVASTATIN SODIUM 40 MG/1
40 TABLET ORAL DAILY
Qty: 30 TAB | Refills: 5 | Status: SHIPPED | OUTPATIENT
Start: 2017-11-07 | End: 2019-01-01 | Stop reason: SDUPTHER

## 2017-11-07 RX ORDER — PIOGLITAZONEHYDROCHLORIDE 30 MG/1
30 TABLET ORAL DAILY
Qty: 30 TAB | Refills: 5 | Status: SHIPPED | OUTPATIENT
Start: 2017-11-07 | End: 2018-02-11 | Stop reason: SDUPTHER

## 2017-11-07 NOTE — TELEPHONE ENCOUNTER
From: Anabela Linares  To: Federico Portillo MD  Sent: 11/6/2017 10:28 PM EST  Subject: Medication Renewal Request    Original authorizing provider: MD Hina Sorensen.  Socorro Bell would like a refill of the following medications:  pravastatin (PRAVACHOL) 40 mg tablet [Janee Lyons MD]  pioglitazone (ACTOS) 30 mg tablet [Janee Lyons MD]  ibuprofen (MOTRIN) 800 mg tablet [Janee Lyons MD]  rOPINIRole (REQUIP) 0.5 mg tablet Basia Lyons MD]  metFORMIN (GLUCOPHAGE) 1,000 mg tablet Basia Lyons MD]  oxyCODONE-acetaminophen (PERCOCET) 5-325 mg per tablet Sharon Lyons MD]    Preferred pharmacy: [facility] [address]:

## 2017-11-08 VITALS
WEIGHT: 136 LBS | OXYGEN SATURATION: 98 % | HEIGHT: 57 IN | TEMPERATURE: 98.1 F | BODY MASS INDEX: 29.34 KG/M2 | HEART RATE: 95 BPM | RESPIRATION RATE: 15 BRPM | SYSTOLIC BLOOD PRESSURE: 116 MMHG | DIASTOLIC BLOOD PRESSURE: 70 MMHG

## 2017-11-08 RX ORDER — LISINOPRIL 20 MG/1
TABLET ORAL
Qty: 30 TAB | Refills: 5 | Status: SHIPPED | OUTPATIENT
Start: 2017-11-08 | End: 2019-01-01 | Stop reason: SDUPTHER

## 2017-11-09 ENCOUNTER — HOME CARE VISIT (OUTPATIENT)
Dept: HOME HEALTH SERVICES | Facility: HOME HEALTH | Age: 57
End: 2017-11-09
Payer: COMMERCIAL

## 2017-11-09 RX ORDER — FLUCONAZOLE 150 MG/1
150 TABLET ORAL DAILY
Qty: 2 TAB | Refills: 0 | Status: SHIPPED | OUTPATIENT
Start: 2017-11-09 | End: 2018-02-11 | Stop reason: SDUPTHER

## 2017-11-10 ENCOUNTER — HOME CARE VISIT (OUTPATIENT)
Dept: SCHEDULING | Facility: HOME HEALTH | Age: 57
End: 2017-11-10
Payer: COMMERCIAL

## 2017-11-10 VITALS
DIASTOLIC BLOOD PRESSURE: 86 MMHG | TEMPERATURE: 97.5 F | OXYGEN SATURATION: 97 % | HEART RATE: 101 BPM | RESPIRATION RATE: 18 BRPM | SYSTOLIC BLOOD PRESSURE: 144 MMHG

## 2017-11-10 PROCEDURE — G0300 HHS/HOSPICE OF LPN EA 15 MIN: HCPCS

## 2017-11-13 ENCOUNTER — HOME CARE VISIT (OUTPATIENT)
Dept: HOME HEALTH SERVICES | Facility: HOME HEALTH | Age: 57
End: 2017-11-13
Payer: COMMERCIAL

## 2017-11-13 ENCOUNTER — OFFICE VISIT (OUTPATIENT)
Dept: FAMILY MEDICINE CLINIC | Age: 57
End: 2017-11-13

## 2017-11-13 VITALS
BODY MASS INDEX: 29.34 KG/M2 | HEIGHT: 57 IN | RESPIRATION RATE: 18 BRPM | WEIGHT: 136 LBS | HEART RATE: 73 BPM | SYSTOLIC BLOOD PRESSURE: 148 MMHG | DIASTOLIC BLOOD PRESSURE: 93 MMHG | OXYGEN SATURATION: 97 % | TEMPERATURE: 98.2 F

## 2017-11-13 DIAGNOSIS — E11.51 CONTROLLED TYPE 2 DM WITH PERIPHERAL CIRCULATORY DISORDER (HCC): ICD-10-CM

## 2017-11-13 DIAGNOSIS — L97.529 ULCER OF FOOT, CHRONIC, LEFT, WITH UNSPECIFIED SEVERITY (HCC): ICD-10-CM

## 2017-11-13 DIAGNOSIS — I73.9 PAD (PERIPHERAL ARTERY DISEASE) (HCC): Primary | ICD-10-CM

## 2017-11-13 PROCEDURE — A6402 STERILE GAUZE <= 16 SQ IN: HCPCS

## 2017-11-13 PROCEDURE — A6216 NON-STERILE GAUZE<=16 SQ IN: HCPCS

## 2017-11-13 PROCEDURE — A4216 STERILE WATER/SALINE, 10 ML: HCPCS

## 2017-11-13 PROCEDURE — A6446 CONFORM BAND S W>=3" <5"/YD: HCPCS

## 2017-11-13 NOTE — PROGRESS NOTES
Pt here with  to follow up from recent hospitalization. C/o ongoing pain in left foot. Would like to discuss changing current ABX.

## 2017-11-13 NOTE — PROGRESS NOTES
Pt here with  to follow up from recent hospitalization. C/o ongoing pain in left foot. Would like to discuss changing current ABX. Pt is receiving home health services. Pt reports dark stools and diarrhea with current antibiotic. Pt reports that it a \"bathroom situation\" all day long. Pt only has a few days left. Pt is concerned about her Crohn's Disease being flared due to antibiotic. Subjective: (As above and below)     Chief Complaint   Patient presents with   [facility] Follow Up     she is a 59y.o. year old female who presents for evaluation. Reviewed PmHx, RxHx, FmHx, SocHx, AllgHx and updated in chart. Review of Systems - negative except as listed above    Objective:     Vitals:    11/13/17 1340   BP: (!) 148/93   Pulse: 73   Resp: 18   Temp: 98.2 °F (36.8 °C)   TempSrc: Oral   SpO2: 97%   Weight: 136 lb (61.7 kg)   Height: 4' 9\" (1.448 m)     Physical Examination: General appearance - alert, well appearing, and in no distress  Mental status - normal mood, behavior, speech, dress, motor activity, and thought processes  Mouth - mucous membranes moist, pharynx normal without lesions  Chest - clear to auscultation, no wheezes, rales or rhonchi, symmetric air entry  Heart - normal rate, regular rhythm, normal S1, S2, no murmurs, rubs, clicks or gallops  Musculoskeletal - wound was dressed this am, not examined today  Extremities - peripheral pulses normal, no pedal edema, no clubbing or cyanosis    Assessment/ Plan:   1. PAD (peripheral artery disease) ([address]  -keep follow up on 11/20    2. Ulcer of foot, chronic, left, with unspecified severity ([address]  -finish abx as prescribed  -monitor stools  -continue wound care through home health    3. Diabetes  -pt is currently on toujeo, 35 units daily  -will switch to 70/30 to help with cost     Follow-up Disposition: As needed  I have discussed the diagnosis with the patient and the intended plan as seen in the above orders.   The patient has received an after-visit summary and questions were answered concerning future plans.      Medication Side Effects and Warnings were discussed with patient: yes  Patient Labs were reviewed: yes  Patient Past Records were reviewed:  yes    Mingo Sifuentes M.D.

## 2017-11-14 ENCOUNTER — HOME CARE VISIT (OUTPATIENT)
Dept: SCHEDULING | Facility: HOME HEALTH | Age: 57
End: 2017-11-14
Payer: COMMERCIAL

## 2017-11-14 VITALS
DIASTOLIC BLOOD PRESSURE: 75 MMHG | SYSTOLIC BLOOD PRESSURE: 125 MMHG | OXYGEN SATURATION: 97 % | HEART RATE: 97 BPM | TEMPERATURE: 98 F

## 2017-11-14 VITALS
DIASTOLIC BLOOD PRESSURE: 78 MMHG | OXYGEN SATURATION: 97 % | HEART RATE: 110 BPM | SYSTOLIC BLOOD PRESSURE: 130 MMHG | TEMPERATURE: 98.6 F

## 2017-11-14 PROCEDURE — G0151 HHCP-SERV OF PT,EA 15 MIN: HCPCS

## 2017-11-14 PROCEDURE — G0155 HHCP-SVS OF CSW,EA 15 MIN: HCPCS

## 2017-11-14 PROCEDURE — G0152 HHCP-SERV OF OT,EA 15 MIN: HCPCS

## 2017-11-15 ENCOUNTER — HOME CARE VISIT (OUTPATIENT)
Dept: HOME HEALTH SERVICES | Facility: HOME HEALTH | Age: 57
End: 2017-11-15
Payer: COMMERCIAL

## 2017-11-17 ENCOUNTER — HOME CARE VISIT (OUTPATIENT)
Dept: SCHEDULING | Facility: HOME HEALTH | Age: 57
End: 2017-11-17
Payer: COMMERCIAL

## 2017-11-17 VITALS
DIASTOLIC BLOOD PRESSURE: 88 MMHG | OXYGEN SATURATION: 98 % | SYSTOLIC BLOOD PRESSURE: 122 MMHG | HEART RATE: 102 BPM | TEMPERATURE: 97 F | RESPIRATION RATE: 18 BRPM

## 2017-11-17 VITALS
TEMPERATURE: 97.8 F | HEART RATE: 95 BPM | SYSTOLIC BLOOD PRESSURE: 150 MMHG | OXYGEN SATURATION: 98 % | DIASTOLIC BLOOD PRESSURE: 82 MMHG

## 2017-11-17 PROCEDURE — G0151 HHCP-SERV OF PT,EA 15 MIN: HCPCS

## 2017-11-17 PROCEDURE — G0300 HHS/HOSPICE OF LPN EA 15 MIN: HCPCS

## 2017-11-21 ENCOUNTER — HOME CARE VISIT (OUTPATIENT)
Dept: SCHEDULING | Facility: HOME HEALTH | Age: 57
End: 2017-11-21
Payer: COMMERCIAL

## 2017-11-21 VITALS
RESPIRATION RATE: 20 BRPM | SYSTOLIC BLOOD PRESSURE: 120 MMHG | TEMPERATURE: 98.4 F | DIASTOLIC BLOOD PRESSURE: 82 MMHG | HEART RATE: 93 BPM | OXYGEN SATURATION: 97 %

## 2017-11-21 VITALS
HEART RATE: 88 BPM | SYSTOLIC BLOOD PRESSURE: 136 MMHG | TEMPERATURE: 97.6 F | RESPIRATION RATE: 20 BRPM | OXYGEN SATURATION: 98 % | DIASTOLIC BLOOD PRESSURE: 84 MMHG

## 2017-11-21 PROCEDURE — G0152 HHCP-SERV OF OT,EA 15 MIN: HCPCS

## 2017-11-21 PROCEDURE — G0300 HHS/HOSPICE OF LPN EA 15 MIN: HCPCS

## 2017-11-21 PROCEDURE — G0151 HHCP-SERV OF PT,EA 15 MIN: HCPCS

## 2017-11-22 VITALS
HEART RATE: 88 BPM | DIASTOLIC BLOOD PRESSURE: 84 MMHG | SYSTOLIC BLOOD PRESSURE: 136 MMHG | TEMPERATURE: 97.6 F | RESPIRATION RATE: 20 BRPM | OXYGEN SATURATION: 98 %

## 2017-11-24 ENCOUNTER — HOME CARE VISIT (OUTPATIENT)
Dept: SCHEDULING | Facility: HOME HEALTH | Age: 57
End: 2017-11-24
Payer: COMMERCIAL

## 2017-11-24 VITALS
TEMPERATURE: 98.5 F | SYSTOLIC BLOOD PRESSURE: 116 MMHG | DIASTOLIC BLOOD PRESSURE: 80 MMHG | HEART RATE: 100 BPM | RESPIRATION RATE: 18 BRPM | OXYGEN SATURATION: 97 %

## 2017-11-24 PROCEDURE — G0151 HHCP-SERV OF PT,EA 15 MIN: HCPCS

## 2017-11-24 PROCEDURE — G0300 HHS/HOSPICE OF LPN EA 15 MIN: HCPCS

## 2017-11-24 PROCEDURE — G0152 HHCP-SERV OF OT,EA 15 MIN: HCPCS

## 2017-11-25 VITALS
OXYGEN SATURATION: 96 % | RESPIRATION RATE: 20 BRPM | TEMPERATURE: 98.1 F | DIASTOLIC BLOOD PRESSURE: 82 MMHG | HEART RATE: 87 BPM | SYSTOLIC BLOOD PRESSURE: 120 MMHG

## 2017-11-25 VITALS
HEART RATE: 87 BPM | TEMPERATURE: 98.1 F | DIASTOLIC BLOOD PRESSURE: 82 MMHG | SYSTOLIC BLOOD PRESSURE: 120 MMHG | RESPIRATION RATE: 20 BRPM | OXYGEN SATURATION: 96 %

## 2017-11-28 ENCOUNTER — HOME CARE VISIT (OUTPATIENT)
Dept: SCHEDULING | Facility: HOME HEALTH | Age: 57
End: 2017-11-28
Payer: COMMERCIAL

## 2017-11-28 VITALS
TEMPERATURE: 98.1 F | OXYGEN SATURATION: 97 % | DIASTOLIC BLOOD PRESSURE: 90 MMHG | SYSTOLIC BLOOD PRESSURE: 128 MMHG | HEART RATE: 101 BPM | RESPIRATION RATE: 20 BRPM

## 2017-11-28 VITALS
RESPIRATION RATE: 18 BRPM | HEART RATE: 68 BPM | OXYGEN SATURATION: 98 % | DIASTOLIC BLOOD PRESSURE: 84 MMHG | SYSTOLIC BLOOD PRESSURE: 144 MMHG | TEMPERATURE: 97.9 F

## 2017-11-28 PROCEDURE — G0300 HHS/HOSPICE OF LPN EA 15 MIN: HCPCS

## 2017-11-28 PROCEDURE — G0151 HHCP-SERV OF PT,EA 15 MIN: HCPCS

## 2017-11-29 ENCOUNTER — HOME CARE VISIT (OUTPATIENT)
Dept: SCHEDULING | Facility: HOME HEALTH | Age: 57
End: 2017-11-29
Payer: COMMERCIAL

## 2017-11-29 VITALS
HEART RATE: 86 BPM | OXYGEN SATURATION: 96 % | TEMPERATURE: 98.9 F | RESPIRATION RATE: 18 BRPM | DIASTOLIC BLOOD PRESSURE: 90 MMHG | SYSTOLIC BLOOD PRESSURE: 140 MMHG

## 2017-11-29 PROCEDURE — G0152 HHCP-SERV OF OT,EA 15 MIN: HCPCS

## 2017-11-30 ENCOUNTER — OFFICE VISIT (OUTPATIENT)
Dept: SURGERY | Age: 57
End: 2017-11-30

## 2017-11-30 ENCOUNTER — TELEPHONE (OUTPATIENT)
Dept: SURGERY | Age: 57
End: 2017-11-30

## 2017-11-30 VITALS
DIASTOLIC BLOOD PRESSURE: 85 MMHG | OXYGEN SATURATION: 100 % | HEIGHT: 57 IN | RESPIRATION RATE: 24 BRPM | HEART RATE: 92 BPM | SYSTOLIC BLOOD PRESSURE: 147 MMHG

## 2017-11-30 DIAGNOSIS — S91.302A OPEN WOUND OF LEFT FOOT, INITIAL ENCOUNTER: ICD-10-CM

## 2017-11-30 DIAGNOSIS — I73.9 PAD (PERIPHERAL ARTERY DISEASE) (HCC): Primary | ICD-10-CM

## 2017-11-30 NOTE — MR AVS SNAPSHOT
Visit Information Date & Time Provider Department Dept. Phone Encounter #  
 11/30/2017  1:00 PM Beto Matias MD [facility] [address] [phone] [id number] Upcoming Health Maintenance Date Due  
 EYE EXAM RETINAL OR DILATED Q1 9/24/1968 COLONOSCOPY 9/24/1976 BREAST CANCER SCRN MAMMOGRAM 9/24/2008 MICROALBUMIN Q1 2/8/2017 FOOT EXAM Q1 3/4/2017 PAP AKA CERVICAL CYTOLOGY 6/19/2017 HEMOGLOBIN A1C Q6M 4/3/2018 LIPID PANEL Q1 10/3/2018 DTaP/Tdap/Td series (2 - Td) 8/29/2025 Allergies as of 11/30/2017  Review Complete On: 11/30/2017 By: Beto Matias MD  
  
 Severity Noted Reaction Type Reactions Potassium Medium 03/03/2016    Other (comments) Patient states she will refuse IV Potassium due to irritation-Patient will accept PO form. Anaprox [Naproxen Sodium]  10/05/2011    Other (comments) Blood pressure drops Tolerates ibuprofen Codeine  10/05/2011    Nausea and Vomiting Tolerates oxycodone/acetaminophen Mobic [Meloxicam]  10/05/2011    Other (comments) Blood pressure drops Tolerates ibuprofen Tylenol [Acetaminophen]  08/29/2015    Other (comments) \"Stomach hurts\", hx of Crohn's disease Current Immunizations  Reviewed on 10/3/2017 Name Date Influenza Vaccine 1/14/2014 Influenza Vaccine (Quad) PF 10/3/2017, 10/25/2016, 10/14/2015, 12/17/2014 Influenza Vaccine Split 10/4/2012, 10/5/2011 Pneumococcal Polysaccharide (PPSV-23) 1/14/2014 Tdap 8/29/2015  8:35 PM  
 ZZZ-RETIRED (DO NOT USE) Pneumococcal Vaccine (Unspecified Type) 10/5/2011 Not reviewed this visit You Were Diagnosed With   
  
 Codes Comments PAD (peripheral artery disease) (HCC)    -  Primary ICD-10-CM: I73.9 ICD-9-CM: 443.9 Open wound of left foot, initial encounter     ICD-10-CM: A73.418H ICD-9-CM: 892.0 Vitals BP Pulse Resp Height(growth percentile) SpO2 OB Status 147/85 (BP 1 Location: Right arm, BP Patient Position: Sitting) 92 24 4' 9\" (1.448 m) 100% Hysterectomy Smoking Status Current Every Day Smoker Preferred Pharmacy Pharmacy Name Phone [facility] [address] Your Updated Medication List  
  
   
This list is accurate as of: 11/30/17  3:42 PM.  Always use your most recent med list.  
  
  
  
  
 aspirin delayed-release 81 mg tablet Commonly known as:  IYEDD-69 Take 1 Tab by mouth daily. collagenase 250 unit/gram ointment Commonly known as:  SANTYL Apply 1 Each to affected area daily. apply nickel thick layer to left foot/ankle wound daily diphenhydrAMINE 25 mg tablet Commonly known as:  BENADRYL Take 50 mg by mouth nightly as needed for Itching. docusate sodium 100 mg capsule Commonly known as:  Ethyl Hoit Take 100 mg by mouth two (2) times daily as needed for Constipation. fish oil-omega-3 fatty acids 340-1,000 mg capsule Take 1 Cap by mouth daily. fluconazole 150 mg tablet Commonly known as:  DIFLUCAN Take 1 Tab by mouth daily. Repeat in 3 days if needed. ibuprofen 800 mg tablet Commonly known as:  MOTRIN Take 1 Tab by mouth every eight (8) hours as needed for Pain. insulin NPH/insulin regular 100 unit/mL (70-30) injection Commonly known as:  NOVOLIN 70/30, HUMULIN 70/30  
10 Units by SubCUTAneous route Before breakfast and dinner for 30 days. lisinopril 20 mg tablet Commonly known as:  PRINIVIL, ZESTRIL  
TAKE ONE TABLET BY MOUTH ONCE DAILY  
  
 metFORMIN 1,000 mg tablet Commonly known as:  GLUCOPHAGE Take 1 Tab by mouth two (2) times daily (with meals). oxybutynin 5 mg tablet Commonly known as:  DITROPAN  
TAKE ONE TABLET BY MOUTH 4 TIMES DAILY  
  
 oxyCODONE-acetaminophen 5-325 mg per tablet Commonly known as:  PERCOCET  
 Take 1 Tab by mouth every four (4) hours as needed for Pain. Max Daily Amount: 6 Tabs. pioglitazone 30 mg tablet Commonly known as:  ACTOS Take 1 Tab by mouth daily. pravastatin 40 mg tablet Commonly known as:  PRAVACHOL Take 1 Tab by mouth daily. rOPINIRole 0.5 mg tablet Commonly known as:  [place] Take 1 Tab by mouth nightly. VITAMIN D3 1,000 unit tablet Generic drug:  cholecalciferol Take 1,000 Units by mouth daily. We Performed the Following REFERRAL TO VASCULAR SURGERY [RFS739 Custom] Comments:  
 Management of aortoiliac PAD Appointment with Dr Jojo Sanchez on Tuesday, December 12, 2017 at 2:30 pm 
[address] [phone] REFERRAL TO WOUND CARE [[id number] Custom] Comments:  
 Referral to [address] [facility] for chronic wound left foot To-Do List   
 12/01/2017 To Be Determined Appointment with Herman Parra RN at [place] [address]  
  
 12/01/2017 To Be Determined Appointment with Herman Parra RN at [place] [address]  
  
 12/01/2017 To Be Determined Appointment with Dusty Villaseñor at [place] [address]  
  
 12/05/2017 To Be Determined Appointment with Dusty Villaseñor at [place] [address]  
  
 12/07/2017 To Be Determined Appointment with Dusty Villaseñor at [place] [address] Referral Information Referral ID Referred By Referred To  
  
 [id number] [facility] [address] [address] Visits Status Start Date End Date 1 New Request 11/30/17 11/30/18 If your referral has a status of pending review or denied, additional information will be sent to support the outcome of this decision. Referral ID Referred By Referred To [id number] Robbi Schultz Not Available Visits Status Start Date End Date 1 New Request 11/30/17 11/30/18 If your referral has a status of pending review or denied, additional information will be sent to support the outcome of this decision. Introducing [facility]! Dear Costa: 
Thank you for requesting a [employer] account. Our records indicate that you already have an active Ranov[employer] account. You can access your account anytime at [email or web address] Did you know that you can access your hospital and ER discharge instructions at any time in Ranovus? You can also review all of your test results from your hospital stay or ER visit. Additional Information If you have questions, please visit the Frequently Asked Questions section of the Ranovus website at [email or web address] Remember, Ranovus is NOT to be used for urgent needs. For medical emergencies, dial 911. Now available from your iPhone and Android! Please provide this summary of care documentation to your next provider. Your primary care clinician is listed as Janee Lyons. If you have any questions after today's visit, please call [phone].

## 2017-11-30 NOTE — PROGRESS NOTES
The patient is seen in follow up regarding PAD and ulceration right ankle. The patient was hospitalized recently for cellulitis. She received IV antibiotics and is completing now an oral antibiotic course. Culture of the wound grew pseudomonas. The patient had MRI while in the hospital which showed no evidence of osteomyelitis. She does have bony deformity in the left foot. Presently the patient is having x3 per week dressing change with Santyl collagenase. The patient reports pain and tenderness in the region of the wound. On examination after removal of the dressing the wound medial aspect left ankle region has clean granulation on about 95% of the surface. There are only minimal areas of fibrinous debris. The surrounding skin appears healthy. There is some rubor of the entire foot. Gel dressing with Telfa and gauze and Merdis Salaam was applied. The patient reports she cannot afford further Santyl collagenase and I will order x3 per week dressing changes with Carrasyn gel. I spoke with Dr. Alexis Jean Baptiste during her hospitalization and the patient will be referred to Dr. Alexis Jean Baptiste for consideration for aortoiliac intervention. The patient will be referred to the wound care center for management of her chronic left ankle wound. The patient can see me prn. Final Diagnosis:  Peripheral artery disease, nonhealing wound left ankle, history of cerebral palsy. MedDATA/gwo     cc:  Deejay Tuttle MD

## 2017-11-30 NOTE — TELEPHONE ENCOUNTER
Spoke with home health nurse. Ms Scar Deluca was seen by Dr Abel Crockett today. Wound care order for left foot changed to: Apply Carrasyn gel, telfa pad, dry gauze, wrap foot & ankle with Terry three times a week.

## 2017-12-01 ENCOUNTER — HOME CARE VISIT (OUTPATIENT)
Dept: SCHEDULING | Facility: HOME HEALTH | Age: 57
End: 2017-12-01
Payer: COMMERCIAL

## 2017-12-01 VITALS
TEMPERATURE: 98.2 F | HEART RATE: 94 BPM | RESPIRATION RATE: 20 BRPM | SYSTOLIC BLOOD PRESSURE: 140 MMHG | DIASTOLIC BLOOD PRESSURE: 92 MMHG | OXYGEN SATURATION: 97 %

## 2017-12-01 PROCEDURE — G0151 HHCP-SERV OF PT,EA 15 MIN: HCPCS

## 2017-12-01 PROCEDURE — G0299 HHS/HOSPICE OF RN EA 15 MIN: HCPCS

## 2017-12-03 VITALS
DIASTOLIC BLOOD PRESSURE: 78 MMHG | OXYGEN SATURATION: 98 % | TEMPERATURE: 99 F | SYSTOLIC BLOOD PRESSURE: 150 MMHG | HEART RATE: 95 BPM | RESPIRATION RATE: 18 BRPM

## 2017-12-04 ENCOUNTER — TELEPHONE (OUTPATIENT)
Dept: SURGERY | Age: 57
End: 2017-12-04

## 2017-12-04 NOTE — TELEPHONE ENCOUNTER
Spoke with Ms Marita Stovall.   Pt has appt at [address] [facility] on Monday, 12/18/17 at 9:30 am.

## 2017-12-05 ENCOUNTER — HOME CARE VISIT (OUTPATIENT)
Dept: SCHEDULING | Facility: HOME HEALTH | Age: 57
End: 2017-12-05
Payer: COMMERCIAL

## 2017-12-05 VITALS
DIASTOLIC BLOOD PRESSURE: 88 MMHG | HEART RATE: 93 BPM | TEMPERATURE: 97.9 F | SYSTOLIC BLOOD PRESSURE: 150 MMHG | RESPIRATION RATE: 18 BRPM | OXYGEN SATURATION: 98 %

## 2017-12-05 VITALS
DIASTOLIC BLOOD PRESSURE: 80 MMHG | TEMPERATURE: 97.6 F | OXYGEN SATURATION: 98 % | HEART RATE: 98 BPM | SYSTOLIC BLOOD PRESSURE: 132 MMHG | RESPIRATION RATE: 18 BRPM

## 2017-12-05 PROCEDURE — G0300 HHS/HOSPICE OF LPN EA 15 MIN: HCPCS

## 2017-12-05 PROCEDURE — G0151 HHCP-SERV OF PT,EA 15 MIN: HCPCS

## 2017-12-07 ENCOUNTER — HOME CARE VISIT (OUTPATIENT)
Dept: SCHEDULING | Facility: HOME HEALTH | Age: 57
End: 2017-12-07
Payer: COMMERCIAL

## 2017-12-07 VITALS
TEMPERATURE: 97.5 F | OXYGEN SATURATION: 99 % | RESPIRATION RATE: 18 BRPM | HEART RATE: 99 BPM | SYSTOLIC BLOOD PRESSURE: 150 MMHG | DIASTOLIC BLOOD PRESSURE: 82 MMHG

## 2017-12-07 PROCEDURE — G0151 HHCP-SERV OF PT,EA 15 MIN: HCPCS

## 2017-12-08 ENCOUNTER — HOME CARE VISIT (OUTPATIENT)
Dept: SCHEDULING | Facility: HOME HEALTH | Age: 57
End: 2017-12-08
Payer: COMMERCIAL

## 2017-12-08 PROCEDURE — G0300 HHS/HOSPICE OF LPN EA 15 MIN: HCPCS

## 2017-12-10 VITALS
DIASTOLIC BLOOD PRESSURE: 90 MMHG | RESPIRATION RATE: 20 BRPM | OXYGEN SATURATION: 98 % | TEMPERATURE: 97.1 F | SYSTOLIC BLOOD PRESSURE: 128 MMHG | HEART RATE: 92 BPM

## 2017-12-13 ENCOUNTER — OFFICE VISIT (OUTPATIENT)
Dept: FAMILY MEDICINE CLINIC | Age: 57
End: 2017-12-13

## 2017-12-13 ENCOUNTER — HOME CARE VISIT (OUTPATIENT)
Dept: SCHEDULING | Facility: HOME HEALTH | Age: 57
End: 2017-12-13
Payer: COMMERCIAL

## 2017-12-13 VITALS
TEMPERATURE: 97.8 F | OXYGEN SATURATION: 95 % | SYSTOLIC BLOOD PRESSURE: 122 MMHG | DIASTOLIC BLOOD PRESSURE: 78 MMHG | RESPIRATION RATE: 18 BRPM | HEART RATE: 92 BPM

## 2017-12-13 VITALS
RESPIRATION RATE: 15 BRPM | BODY MASS INDEX: 31.97 KG/M2 | HEART RATE: 97 BPM | DIASTOLIC BLOOD PRESSURE: 65 MMHG | TEMPERATURE: 98.6 F | SYSTOLIC BLOOD PRESSURE: 103 MMHG | OXYGEN SATURATION: 94 % | HEIGHT: 57 IN | WEIGHT: 148.2 LBS

## 2017-12-13 DIAGNOSIS — J01.00 ACUTE NON-RECURRENT MAXILLARY SINUSITIS: Primary | ICD-10-CM

## 2017-12-13 PROCEDURE — G0300 HHS/HOSPICE OF LPN EA 15 MIN: HCPCS

## 2017-12-13 RX ORDER — BENZONATATE 200 MG/1
200 CAPSULE ORAL
Qty: 30 CAP | Refills: 2 | Status: SHIPPED | OUTPATIENT
Start: 2017-12-13 | End: 2017-12-20

## 2017-12-13 RX ORDER — CEFDINIR 300 MG/1
300 CAPSULE ORAL 2 TIMES DAILY
Qty: 20 CAP | Refills: 0 | Status: SHIPPED | OUTPATIENT
Start: 2017-12-13 | End: 2017-12-19

## 2017-12-13 NOTE — MR AVS SNAPSHOT
Visit Information Date & Time Provider Department Dept. Phone Encounter #  
 12/13/2017  3:15 PM Mukesh Hernandez MD [address] [facility] [phone] [id number] Upcoming Health Maintenance Date Due  
 EYE EXAM RETINAL OR DILATED Q1 9/24/1968 COLONOSCOPY 9/24/1976 MICROALBUMIN Q1 2/8/2017 FOOT EXAM Q1 3/4/2017 PAP AKA CERVICAL CYTOLOGY 6/19/2017 HEMOGLOBIN A1C Q6M 4/3/2018 LIPID PANEL Q1 10/3/2018 DTaP/Tdap/Td series (2 - Td) 8/29/2025 Allergies as of 12/13/2017  Review Complete On: 12/13/2017 By: Mukesh Hernandez MD  
  
 Severity Noted Reaction Type Reactions Potassium Medium 03/03/2016    Other (comments) Patient states she will refuse IV Potassium due to irritation-Patient will accept PO form. Anaprox [Naproxen Sodium]  10/05/2011    Other (comments) Blood pressure drops Tolerates ibuprofen Codeine  10/05/2011    Nausea and Vomiting Tolerates oxycodone/acetaminophen Mobic [Meloxicam]  10/05/2011    Other (comments) Blood pressure drops Tolerates ibuprofen Tylenol [Acetaminophen]  08/29/2015    Other (comments) \"Stomach hurts\", hx of Crohn's disease Current Immunizations  Reviewed on 10/3/2017 Name Date Influenza Vaccine 1/14/2014 Influenza Vaccine (Quad) PF 10/3/2017, 10/25/2016, 10/14/2015, 12/17/2014 Influenza Vaccine Split 10/4/2012, 10/5/2011 Pneumococcal Polysaccharide (PPSV-23) 1/14/2014 Tdap 8/29/2015  8:35 PM  
 ZZZ-RETIRED (DO NOT USE) Pneumococcal Vaccine (Unspecified Type) 10/5/2011 Not reviewed this visit You Were Diagnosed With   
  
 Codes Comments Acute non-recurrent maxillary sinusitis    -  Primary ICD-10-CM: J01.00 ICD-9-CM: 461.0 Vitals BP Pulse Temp Resp Height(growth percentile) Weight(growth percentile) 103/65 (BP 1 Location: Right arm, BP Patient Position: Sitting) 97 98.6 °F (37 °C) (Oral) 15 4' 9\" (1.448 m) 148 lb 3.2 oz (67.2 kg) SpO2 BMI OB Status Smoking Status 94% 32.07 kg/m2 Hysterectomy Current Every Day Smoker Vitals History BMI and BSA Data Body Mass Index Body Surface Area 32.07 kg/m 2 1.64 m 2 Preferred Pharmacy Pharmacy Name Phone [facility] [phone] Your Updated Medication List  
  
   
This list is accurate as of: 12/13/17  4:23 PM.  Always use your most recent med list.  
  
  
  
  
 aspirin delayed-release 81 mg tablet Commonly known as:  LELGU-75 Take 1 Tab by mouth daily. benzonatate 200 mg capsule Commonly known as:  TESSALON Take 1 Cap by mouth three (3) times daily as needed for Cough for up to 7 days. cefdinir 300 mg capsule Commonly known as:  OMNICEF Take 1 Cap by mouth two (2) times a day for 10 days. diphenhydrAMINE 25 mg tablet Commonly known as:  BENADRYL Take 50 mg by mouth nightly as needed for Itching. docusate sodium 100 mg capsule Commonly known as:  Luwafroilan Gregg Take 100 mg by mouth two (2) times daily as needed for Constipation. fish oil-omega-3 fatty acids 340-1,000 mg capsule Take 1 Cap by mouth daily. fluconazole 150 mg tablet Commonly known as:  DIFLUCAN Take 1 Tab by mouth daily. Repeat in 3 days if needed. ibuprofen 800 mg tablet Commonly known as:  MOTRIN Take 1 Tab by mouth every eight (8) hours as needed for Pain. insulin NPH/insulin regular 100 unit/mL (70-30) injection Commonly known as:  NOVOLIN 70/30, HUMULIN 70/30  
10 Units by SubCUTAneous route Before breakfast and dinner for 30 days. lisinopril 20 mg tablet Commonly known as:  PRINIVIL, ZESTRIL  
TAKE ONE TABLET BY MOUTH ONCE DAILY  
  
 metFORMIN 1,000 mg tablet Commonly known as:  GLUCOPHAGE Take 1 Tab by mouth two (2) times daily (with meals). oxybutynin 5 mg tablet Commonly known as:  DITROPAN  
TAKE ONE TABLET BY MOUTH 4 TIMES DAILY oxyCODONE-acetaminophen 5-325 mg per tablet Commonly known as:  PERCOCET Take 1 Tab by mouth every four (4) hours as needed for Pain. Max Daily Amount: 6 Tabs. pioglitazone 30 mg tablet Commonly known as:  ACTOS Take 1 Tab by mouth daily. pravastatin 40 mg tablet Commonly known as:  PRAVACHOL Take 1 Tab by mouth daily. rOPINIRole 0.5 mg tablet Commonly known as:  Aisha Elmer Take 1 Tab by mouth nightly. VITAMIN D3 1,000 unit tablet Generic drug:  cholecalciferol Take 1,000 Units by mouth daily. Prescriptions Sent to Pharmacy Refills  
 cefdinir (OMNICEF) 300 mg capsule 0 Sig: Take 1 Cap by mouth two (2) times a day for 10 days. Class: Normal  
 Pharmacy: [facility] [address] [place] [address] Ph #: [phone] Route: Oral  
 benzonatate (TESSALON) 200 mg capsule 2 Sig: Take 1 Cap by mouth three (3) times daily as needed for Cough for up to 7 days. Class: Normal  
 Pharmacy: [address] [place] [address] Ph #: [phone] Route: Oral  
  
To-Do List   
 12/15/2017 To Be Determined Appointment with Roseline Aguirre LPN at [place] [address]  
  
 12/18/2017 9:30 AM  
  Appointment with Del Parks MD; [facility] at [address] [place] [address] ([phone])  
  
 12/19/2017 To Be Determined Appointment with Roseline Aguirre LPN at [place] [address]  
  
 12/22/2017 To Be Determined Appointment with Mell Gardiner RN at [place] [address]  
  
 12/22/2017 To Be Determined Appointment with Mell Gardiner RN at [address] [facility]! Dear Jasmeet Pink: 
Thank you for requesting a [employer] account. Our records indicate that you already have an active [facility] account.   You can access your account anytime at [email or web address] Did you know that you can access your hospital and ER discharge instructions at any time in Exiles? You can also review all of your test results from your hospital stay or ER visit. Additional Information If you have questions, please visit the Frequently Asked Questions section of the Exiles website at [email or web address] Remember, Exiles is NOT to be used for urgent needs. For medical emergencies, dial 911. Now available from your iPhone and Android! Please provide this summary of care documentation to your next provider. Your primary care clinician is listed as Janee Lyons. If you have any questions after today's visit, please call [phone].

## 2017-12-13 NOTE — PROGRESS NOTES
Chief Complaint   Patient presents with    Chills    Cold Symptoms     x's 3 days     Patient seen in the office today with spouse present for c/o of chills and runny nose x' 3 days

## 2017-12-13 NOTE — PROGRESS NOTES
Chief Complaint   Patient presents with    Chills    Cold Symptoms     x's 3 days     Patient seen in the office today with spouse present for c/o of chills and runny nose x 3 days    Subjective: (As above and below)     Chief Complaint   Patient presents with    Chills    Cold Symptoms     x's 3 days     she is a 59y.o. year old female who presents for evaluation. Reviewed PmHx, RxHx, FmHx, SocHx, AllgHx and updated in chart. Review of Systems - negative except as listed above    Objective:     Vitals:    12/13/17 1536 12/13/17 1545   BP: 92/60 103/65   Pulse: 97 97   Resp: 15    Temp: 98.6 °F (37 °C)    TempSrc: Oral    SpO2: 94%    Weight: 148 lb 3.2 oz (67.2 kg)    Height: 4' 9\" (1.448 m)      Physical Examination: General appearance - alert, well appearing, and in no distress  Mental status - normal mood, behavior, speech, dress, motor activity, and thought processes  Mouth - mucous membranes moist, pharynx normal without lesions  Chest - clear to auscultation, no wheezes, rales or rhonchi, symmetric air entry  Heart - normal rate, regular rhythm, normal S1, S2, no murmurs, rubs, clicks or gallops  Musculoskeletal - no joint tenderness, deformity or swelling  Extremities - peripheral pulses normal, no pedal edema, no clubbing or cyanosis    Assessment/ Plan:   1. Acute non-recurrent maxillary sinusitis  -treat with omnicef  -tessalon as written     Follow-up Disposition: As needed  I have discussed the diagnosis with the patient and the intended plan as seen in the above orders. The patient has received an after-visit summary and questions were answered concerning future plans.      Medication Side Effects and Warnings were discussed with patient: yes  Patient Labs were reviewed: yes  Patient Past Records were reviewed:  yes    Arya Cole M.D.

## 2017-12-15 ENCOUNTER — HOME CARE VISIT (OUTPATIENT)
Dept: SCHEDULING | Facility: HOME HEALTH | Age: 57
End: 2017-12-15
Payer: COMMERCIAL

## 2017-12-15 VITALS
SYSTOLIC BLOOD PRESSURE: 148 MMHG | OXYGEN SATURATION: 98 % | RESPIRATION RATE: 18 BRPM | HEART RATE: 94 BPM | TEMPERATURE: 97.1 F | DIASTOLIC BLOOD PRESSURE: 90 MMHG

## 2017-12-15 PROCEDURE — G0300 HHS/HOSPICE OF LPN EA 15 MIN: HCPCS

## 2017-12-18 ENCOUNTER — HOSPITAL ENCOUNTER (OUTPATIENT)
Dept: WOUND CARE | Age: 57
End: 2017-12-18

## 2017-12-19 ENCOUNTER — TELEPHONE (OUTPATIENT)
Dept: FAMILY MEDICINE CLINIC | Age: 57
End: 2017-12-19

## 2017-12-19 RX ORDER — AMOXICILLIN 500 MG/1
500 CAPSULE ORAL 3 TIMES DAILY
Qty: 30 CAP | Refills: 0 | Status: SHIPPED | OUTPATIENT
Start: 2017-12-19 | End: 2017-12-29

## 2017-12-19 NOTE — TELEPHONE ENCOUNTER
Please advise pt that abx has been changed to amoxicillin.  Please take OTC cough medication as needed

## 2017-12-19 NOTE — TELEPHONE ENCOUNTER
Patient states the medication has not been picked up from the pharmacy due to the cost. Patient reports for both medications the cost is $200.00. Patient informed the provider will be notified. Writer will return call to follow up with any further instructions or recommendations. Patient expressed understanding.

## 2017-12-19 NOTE — TELEPHONE ENCOUNTER
Call placed to patient,following up on home health nurse concern due to patient has not picked up Abt from pharmacy due to associated cost. Voicemail left to return call to the office.  Will follow up

## 2017-12-20 ENCOUNTER — TELEPHONE (OUTPATIENT)
Dept: SURGERY | Age: 57
End: 2017-12-20

## 2017-12-20 ENCOUNTER — HOME CARE VISIT (OUTPATIENT)
Dept: HOME HEALTH SERVICES | Facility: HOME HEALTH | Age: 57
End: 2017-12-20
Payer: COMMERCIAL

## 2017-12-20 NOTE — TELEPHONE ENCOUNTER
Ms Scar Deluca missed appt at [address] [facility] on 12/18/17. Pt states she has been ill & spouse also ill. Ms Scar Deluca plans to call wound center when feeling better.

## 2017-12-22 ENCOUNTER — HOME CARE VISIT (OUTPATIENT)
Dept: HOME HEALTH SERVICES | Facility: HOME HEALTH | Age: 57
End: 2017-12-22
Payer: COMMERCIAL

## 2017-12-26 ENCOUNTER — HOME CARE VISIT (OUTPATIENT)
Dept: SCHEDULING | Facility: HOME HEALTH | Age: 57
End: 2017-12-26
Payer: COMMERCIAL

## 2017-12-26 VITALS
TEMPERATURE: 97.4 F | OXYGEN SATURATION: 97 % | HEART RATE: 101 BPM | RESPIRATION RATE: 20 BRPM | DIASTOLIC BLOOD PRESSURE: 84 MMHG | SYSTOLIC BLOOD PRESSURE: 120 MMHG

## 2017-12-26 PROCEDURE — G0300 HHS/HOSPICE OF LPN EA 15 MIN: HCPCS

## 2017-12-28 ENCOUNTER — HOME CARE VISIT (OUTPATIENT)
Dept: HOME HEALTH SERVICES | Facility: HOME HEALTH | Age: 57
End: 2017-12-28
Payer: COMMERCIAL

## 2018-01-01 ENCOUNTER — TELEPHONE (OUTPATIENT)
Dept: FAMILY MEDICINE CLINIC | Age: 58
End: 2018-01-01

## 2018-01-01 ENCOUNTER — OFFICE VISIT (OUTPATIENT)
Dept: FAMILY MEDICINE CLINIC | Age: 58
End: 2018-01-01

## 2018-01-01 VITALS
DIASTOLIC BLOOD PRESSURE: 66 MMHG | HEIGHT: 57 IN | OXYGEN SATURATION: 97 % | WEIGHT: 124 LBS | HEART RATE: 79 BPM | TEMPERATURE: 98.1 F | RESPIRATION RATE: 18 BRPM | SYSTOLIC BLOOD PRESSURE: 98 MMHG | BODY MASS INDEX: 26.75 KG/M2

## 2018-01-01 VITALS
HEIGHT: 57 IN | HEART RATE: 92 BPM | SYSTOLIC BLOOD PRESSURE: 133 MMHG | TEMPERATURE: 98.5 F | DIASTOLIC BLOOD PRESSURE: 85 MMHG | WEIGHT: 128 LBS | OXYGEN SATURATION: 99 % | RESPIRATION RATE: 18 BRPM | BODY MASS INDEX: 27.61 KG/M2

## 2018-01-01 VITALS
RESPIRATION RATE: 16 BRPM | DIASTOLIC BLOOD PRESSURE: 88 MMHG | WEIGHT: 132 LBS | HEART RATE: 84 BPM | HEIGHT: 57 IN | SYSTOLIC BLOOD PRESSURE: 132 MMHG | OXYGEN SATURATION: 95 % | BODY MASS INDEX: 28.48 KG/M2 | TEMPERATURE: 97.7 F

## 2018-01-01 DIAGNOSIS — S91.002S WOUND OF LEFT ANKLE, SEQUELA: ICD-10-CM

## 2018-01-01 DIAGNOSIS — R10.9 ABDOMINAL PAIN, UNSPECIFIED ABDOMINAL LOCATION: Primary | ICD-10-CM

## 2018-01-01 DIAGNOSIS — E11.40 TYPE 2 DIABETES MELLITUS WITH DIABETIC NEUROPATHY, WITH LONG-TERM CURRENT USE OF INSULIN (HCC): ICD-10-CM

## 2018-01-01 DIAGNOSIS — R82.90 CLOUDY URINE: ICD-10-CM

## 2018-01-01 DIAGNOSIS — Z23 ENCOUNTER FOR IMMUNIZATION: ICD-10-CM

## 2018-01-01 DIAGNOSIS — Z59.9 FINANCIAL DIFFICULTIES: Primary | ICD-10-CM

## 2018-01-01 DIAGNOSIS — Z79.4 TYPE 2 DIABETES MELLITUS WITH DIABETIC NEUROPATHY, WITH LONG-TERM CURRENT USE OF INSULIN (HCC): ICD-10-CM

## 2018-01-01 DIAGNOSIS — K21.9 GASTROESOPHAGEAL REFLUX DISEASE WITHOUT ESOPHAGITIS: ICD-10-CM

## 2018-01-01 DIAGNOSIS — E11.51 CONTROLLED TYPE 2 DM WITH PERIPHERAL CIRCULATORY DISORDER (HCC): ICD-10-CM

## 2018-01-01 DIAGNOSIS — Z76.0 MEDICATION REFILL: Primary | ICD-10-CM

## 2018-01-01 DIAGNOSIS — N39.41 URGE INCONTINENCE: ICD-10-CM

## 2018-01-01 LAB
ALBUMIN SERPL-MCNC: 4 G/DL (ref 3.5–5.5)
ALBUMIN SERPL-MCNC: 4.1 G/DL (ref 3.6–4.8)
ALBUMIN/GLOB SERPL: 1.4 {RATIO} (ref 1.2–2.2)
ALBUMIN/GLOB SERPL: 1.4 {RATIO} (ref 1.2–2.2)
ALP SERPL-CCNC: 107 IU/L (ref 39–117)
ALP SERPL-CCNC: 158 IU/L (ref 39–117)
ALT SERPL-CCNC: 14 IU/L (ref 0–32)
ALT SERPL-CCNC: 16 IU/L (ref 0–32)
AMYLASE SERPL-CCNC: 18 U/L (ref 31–124)
AST SERPL-CCNC: 12 IU/L (ref 0–40)
AST SERPL-CCNC: 13 IU/L (ref 0–40)
BARBITURATES UR POC: NEGATIVE
BASOPHILS # BLD AUTO: 0 X10E3/UL (ref 0–0.2)
BASOPHILS # BLD AUTO: 0.1 X10E3/UL (ref 0–0.2)
BASOPHILS NFR BLD AUTO: 0 %
BASOPHILS NFR BLD AUTO: 1 %
BENZODIAZEPINES UR POC: NEGATIVE
BILIRUB SERPL-MCNC: 0.2 MG/DL (ref 0–1.2)
BILIRUB SERPL-MCNC: <0.2 MG/DL (ref 0–1.2)
BILIRUB UR QL STRIP: NEGATIVE
BILIRUB UR QL STRIP: NEGATIVE
BUN SERPL-MCNC: 16 MG/DL (ref 6–24)
BUN SERPL-MCNC: 21 MG/DL (ref 8–27)
BUN/CREAT SERPL: 25 (ref 12–28)
BUN/CREAT SERPL: 25 (ref 9–23)
CALCIUM SERPL-MCNC: 10.4 MG/DL (ref 8.7–10.3)
CALCIUM SERPL-MCNC: 8.9 MG/DL (ref 8.7–10.2)
CHLORIDE SERPL-SCNC: 104 MMOL/L (ref 96–106)
CHLORIDE SERPL-SCNC: 97 MMOL/L (ref 96–106)
CO2 SERPL-SCNC: 16 MMOL/L (ref 20–29)
CO2 SERPL-SCNC: 17 MMOL/L (ref 20–29)
COCAINE QL URINE POC: NEGATIVE
CREAT SERPL-MCNC: 0.65 MG/DL (ref 0.57–1)
CREAT SERPL-MCNC: 0.85 MG/DL (ref 0.57–1)
EOSINOPHIL # BLD AUTO: 0.3 X10E3/UL (ref 0–0.4)
EOSINOPHIL # BLD AUTO: 0.5 X10E3/UL (ref 0–0.4)
EOSINOPHIL NFR BLD AUTO: 2 %
EOSINOPHIL NFR BLD AUTO: 4 %
ERYTHROCYTE [DISTWIDTH] IN BLOOD BY AUTOMATED COUNT: 13.7 % (ref 12.3–15.4)
ERYTHROCYTE [DISTWIDTH] IN BLOOD BY AUTOMATED COUNT: 14 % (ref 12.3–15.4)
EST. AVERAGE GLUCOSE BLD GHB EST-MCNC: 289 MG/DL
GLOBULIN SER CALC-MCNC: 2.8 G/DL (ref 1.5–4.5)
GLOBULIN SER CALC-MCNC: 2.9 G/DL (ref 1.5–4.5)
GLUCOSE SERPL-MCNC: 300 MG/DL (ref 65–99)
GLUCOSE SERPL-MCNC: 580 MG/DL (ref 65–99)
GLUCOSE UR-MCNC: NORMAL MG/DL
GLUCOSE UR-MCNC: NORMAL MG/DL
HBA1C MFR BLD: 11.7 % (ref 4.8–5.6)
HCT VFR BLD AUTO: 31.7 % (ref 34–46.6)
HCT VFR BLD AUTO: 38.2 % (ref 34–46.6)
HGB BLD-MCNC: 10.5 G/DL (ref 11.1–15.9)
HGB BLD-MCNC: 12 G/DL (ref 11.1–15.9)
IMM GRANULOCYTES # BLD: 0 X10E3/UL (ref 0–0.1)
IMM GRANULOCYTES # BLD: 0 X10E3/UL (ref 0–0.1)
IMM GRANULOCYTES NFR BLD: 0 %
IMM GRANULOCYTES NFR BLD: 0 %
KETONES P FAST UR STRIP-MCNC: NEGATIVE MG/DL
KETONES P FAST UR STRIP-MCNC: NEGATIVE MG/DL
LIPASE SERPL-CCNC: 37 U/L (ref 14–72)
LOT EXP DATE POC: NORMAL
LOT NUMBER POC: NORMAL
LYMPHOCYTES # BLD AUTO: 2.7 X10E3/UL (ref 0.7–3.1)
LYMPHOCYTES # BLD AUTO: 3.6 X10E3/UL (ref 0.7–3.1)
LYMPHOCYTES NFR BLD AUTO: 19 %
LYMPHOCYTES NFR BLD AUTO: 29 %
Lab: POSITIVE
MARIJUANA (THC) QL URINE POC: NEGATIVE
MCH RBC QN AUTO: 27.6 PG (ref 26.6–33)
MCH RBC QN AUTO: 28.3 PG (ref 26.6–33)
MCHC RBC AUTO-ENTMCNC: 31.4 G/DL (ref 31.5–35.7)
MCHC RBC AUTO-ENTMCNC: 33.1 G/DL (ref 31.5–35.7)
MCV RBC AUTO: 85 FL (ref 79–97)
MCV RBC AUTO: 88 FL (ref 79–97)
MDMA/ECSTASY UR POC: NEGATIVE
METHADONE QL URINE POC: NEGATIVE
METHAMPHETAMINE QL URINE POC: NEGATIVE
MICROALBUMIN UR TEST STR-MCNC: 150 MG/L
MICROALBUMIN/CREAT RATIO POC: <30 MG/G
MONOCYTES # BLD AUTO: 0.4 X10E3/UL (ref 0.1–0.9)
MONOCYTES # BLD AUTO: 0.5 X10E3/UL (ref 0.1–0.9)
MONOCYTES NFR BLD AUTO: 3 %
MONOCYTES NFR BLD AUTO: 4 %
NEUTROPHILS # BLD AUTO: 10.8 X10E3/UL (ref 1.4–7)
NEUTROPHILS # BLD AUTO: 7.9 X10E3/UL (ref 1.4–7)
NEUTROPHILS NFR BLD AUTO: 62 %
NEUTROPHILS NFR BLD AUTO: 76 %
NTI OTHER MICRO TRANSPORT 977598: NEGATIVE
OPIATES QL URINE POC: NEGATIVE
OXYCODONE UR POC: NEGATIVE
PH UR STRIP: 7 [PH] (ref 4.6–8)
PH UR STRIP: 7 [PH] (ref 4.6–8)
PLATELET # BLD AUTO: 385 X10E3/UL (ref 150–379)
PLATELET # BLD AUTO: 438 X10E3/UL (ref 150–379)
POTASSIUM SERPL-SCNC: 3.2 MMOL/L (ref 3.5–5.2)
POTASSIUM SERPL-SCNC: 3.3 MMOL/L (ref 3.5–5.2)
PROT SERPL-MCNC: 6.8 G/DL (ref 6–8.5)
PROT SERPL-MCNC: 7 G/DL (ref 6–8.5)
PROT UR QL STRIP: NORMAL
PROT UR QL STRIP: NORMAL
RBC # BLD AUTO: 3.71 X10E6/UL (ref 3.77–5.28)
RBC # BLD AUTO: 4.35 X10E6/UL (ref 3.77–5.28)
SODIUM SERPL-SCNC: 135 MMOL/L (ref 134–144)
SODIUM SERPL-SCNC: 138 MMOL/L (ref 134–144)
SP GR UR STRIP: 1.02 (ref 1–1.03)
SP GR UR STRIP: 1.02 (ref 1–1.03)
UA UROBILINOGEN AMB POC: NORMAL (ref 0.2–1)
UA UROBILINOGEN AMB POC: NORMAL (ref 0.2–1)
URINALYSIS CLARITY POC: NORMAL
URINALYSIS CLARITY POC: NORMAL
URINALYSIS COLOR POC: YELLOW
URINALYSIS COLOR POC: YELLOW
URINE BLOOD POC: NORMAL
URINE BLOOD POC: NORMAL
URINE LEUKOCYTES POC: NORMAL
URINE LEUKOCYTES POC: NORMAL
URINE NITRITES POC: NEGATIVE
URINE NITRITES POC: POSITIVE
VALID INTERNAL CONTROL?: YES
WBC # BLD AUTO: 12.6 X10E3/UL (ref 3.4–10.8)
WBC # BLD AUTO: 14.2 X10E3/UL (ref 3.4–10.8)

## 2018-01-01 RX ORDER — OXYBUTYNIN CHLORIDE 5 MG/1
TABLET ORAL
Qty: 120 TAB | Refills: 5 | Status: SHIPPED | OUTPATIENT
Start: 2018-01-01 | End: 2019-01-01 | Stop reason: SDUPTHER

## 2018-01-01 RX ORDER — PROMETHAZINE HYDROCHLORIDE 25 MG/1
25 TABLET ORAL
Qty: 30 TAB | Refills: 5 | Status: SHIPPED | OUTPATIENT
Start: 2018-01-01 | End: 2019-01-01 | Stop reason: SDUPTHER

## 2018-01-01 RX ORDER — CIPROFLOXACIN 500 MG/1
500 TABLET ORAL 2 TIMES DAILY
Qty: 20 TAB | Refills: 0 | Status: SHIPPED | OUTPATIENT
Start: 2018-01-01 | End: 2018-01-01

## 2018-01-01 RX ORDER — SILVER SULFADIAZINE 10 G/1000G
CREAM TOPICAL DAILY
Qty: 50 G | Refills: 0 | Status: SHIPPED | OUTPATIENT
Start: 2018-01-01 | End: 2018-01-01 | Stop reason: SDUPTHER

## 2018-01-01 RX ORDER — OXYCODONE AND ACETAMINOPHEN 5; 325 MG/1; MG/1
1 TABLET ORAL
Qty: 45 TAB | Refills: 0 | Status: SHIPPED | OUTPATIENT
Start: 2018-01-01 | End: 2018-01-01 | Stop reason: SDUPTHER

## 2018-01-01 RX ORDER — FLUCONAZOLE 150 MG/1
150 TABLET ORAL DAILY
Qty: 2 TAB | Refills: 0 | Status: SHIPPED | OUTPATIENT
Start: 2018-01-01 | End: 2018-01-01 | Stop reason: SDUPTHER

## 2018-01-01 RX ORDER — SILVER SULFADIAZINE 10 G/1000G
CREAM TOPICAL
Qty: 50 G | Refills: 3 | Status: ON HOLD | OUTPATIENT
Start: 2018-01-01 | End: 2019-01-01

## 2018-01-01 RX ORDER — CIPROFLOXACIN 500 MG/1
TABLET ORAL
Qty: 14 TAB | Refills: 0 | Status: ON HOLD | OUTPATIENT
Start: 2018-01-01 | End: 2019-01-01

## 2018-01-01 RX ORDER — ROPINIROLE 0.5 MG/1
0.5 TABLET, FILM COATED ORAL
Qty: 30 TAB | Refills: 5 | Status: SHIPPED | OUTPATIENT
Start: 2018-01-01 | End: 2019-01-01 | Stop reason: SDUPTHER

## 2018-01-01 RX ORDER — SILVER SULFADIAZINE 10 G/1000G
CREAM TOPICAL
Qty: 50 G | Refills: 3 | Status: SHIPPED | OUTPATIENT
Start: 2018-01-01 | End: 2018-01-01 | Stop reason: SDUPTHER

## 2018-01-01 RX ORDER — FLUCONAZOLE 150 MG/1
TABLET ORAL
Qty: 2 TAB | Refills: 0 | Status: SHIPPED | OUTPATIENT
Start: 2018-01-01 | End: 2019-01-01 | Stop reason: SDUPTHER

## 2018-01-01 RX ORDER — CIPROFLOXACIN 500 MG/1
500 TABLET ORAL 2 TIMES DAILY
Qty: 14 TAB | Refills: 0 | Status: SHIPPED | OUTPATIENT
Start: 2018-01-01 | End: 2018-01-01 | Stop reason: SDUPTHER

## 2018-01-01 RX ORDER — PANTOPRAZOLE SODIUM 40 MG/1
40 TABLET, DELAYED RELEASE ORAL DAILY
Qty: 30 TAB | Refills: 2 | Status: SHIPPED | OUTPATIENT
Start: 2018-01-01

## 2018-01-01 RX ORDER — ZINC GLUCONATE 13.3 MG
200 LOZENGE ORAL 3 TIMES DAILY
Qty: 90 TAB | Refills: 1 | Status: SHIPPED | OUTPATIENT
Start: 2018-01-01 | End: 2018-01-01 | Stop reason: ALTCHOICE

## 2018-01-01 RX ORDER — AMITRIPTYLINE HYDROCHLORIDE 50 MG/1
TABLET, FILM COATED ORAL
Qty: 30 TAB | Refills: 2 | Status: SHIPPED | OUTPATIENT
Start: 2018-01-01 | End: 2019-01-01 | Stop reason: SDUPTHER

## 2018-01-01 RX ORDER — POTASSIUM CHLORIDE 750 MG/1
10 TABLET, EXTENDED RELEASE ORAL DAILY
Qty: 30 TAB | Refills: 5 | Status: SHIPPED | OUTPATIENT
Start: 2018-01-01 | End: 2019-01-01 | Stop reason: DRUGHIGH

## 2018-01-01 RX ORDER — PANTOPRAZOLE SODIUM 40 MG/1
40 TABLET, DELAYED RELEASE ORAL DAILY
Qty: 30 TAB | Refills: 2 | Status: SHIPPED | OUTPATIENT
Start: 2018-01-01 | End: 2018-01-01 | Stop reason: SDUPTHER

## 2018-01-01 SDOH — ECONOMIC STABILITY - INCOME SECURITY: PROBLEM RELATED TO HOUSING AND ECONOMIC CIRCUMSTANCES, UNSPECIFIED: Z59.9

## 2018-01-02 ENCOUNTER — HOME CARE VISIT (OUTPATIENT)
Dept: HOME HEALTH SERVICES | Facility: HOME HEALTH | Age: 58
End: 2018-01-02
Payer: COMMERCIAL

## 2018-01-03 ENCOUNTER — HOME CARE VISIT (OUTPATIENT)
Dept: SCHEDULING | Facility: HOME HEALTH | Age: 58
End: 2018-01-03
Payer: COMMERCIAL

## 2018-01-03 PROCEDURE — G0300 HHS/HOSPICE OF LPN EA 15 MIN: HCPCS

## 2018-01-03 PROCEDURE — A6446 CONFORM BAND S W>=3" <5"/YD: HCPCS

## 2018-01-03 PROCEDURE — A6402 STERILE GAUZE <= 16 SQ IN: HCPCS

## 2018-01-03 PROCEDURE — A4216 STERILE WATER/SALINE, 10 ML: HCPCS

## 2018-01-03 PROCEDURE — A4452 WATERPROOF TAPE: HCPCS

## 2018-01-04 ENCOUNTER — TELEPHONE (OUTPATIENT)
Dept: FAMILY MEDICINE CLINIC | Age: 58
End: 2018-01-04

## 2018-01-04 VITALS
SYSTOLIC BLOOD PRESSURE: 124 MMHG | TEMPERATURE: 97.6 F | DIASTOLIC BLOOD PRESSURE: 86 MMHG | HEART RATE: 105 BPM | RESPIRATION RATE: 20 BRPM | OXYGEN SATURATION: 98 %

## 2018-01-04 DIAGNOSIS — S91.002S WOUND OF LEFT ANKLE, SEQUELA: ICD-10-CM

## 2018-01-04 RX ORDER — OXYCODONE AND ACETAMINOPHEN 5; 325 MG/1; MG/1
1 TABLET ORAL
Qty: 45 TAB | Refills: 0 | Status: SHIPPED | OUTPATIENT
Start: 2018-01-04 | End: 2018-02-04 | Stop reason: SDUPTHER

## 2018-01-04 NOTE — TELEPHONE ENCOUNTER
From: Kristy Clark  To: Belkis Lyons MD  Sent: 1/4/2018 1:53 PM EST  Subject: Medication Renewal Request    Original authorizing provider: MD Ashvin Sheth.  Florencio Branham would like a refill of the following medications:  oxyCODONE-acetaminophen (PERCOCET) 5-325 mg per tablet Belkis Lyons MD]    Preferred pharmacy: [address] [place] [address] [place]:

## 2018-01-05 ENCOUNTER — HOME CARE VISIT (OUTPATIENT)
Dept: SCHEDULING | Facility: HOME HEALTH | Age: 58
End: 2018-01-05
Payer: COMMERCIAL

## 2018-01-05 VITALS
RESPIRATION RATE: 20 BRPM | OXYGEN SATURATION: 97 % | DIASTOLIC BLOOD PRESSURE: 70 MMHG | HEART RATE: 102 BPM | TEMPERATURE: 97.7 F | SYSTOLIC BLOOD PRESSURE: 110 MMHG

## 2018-01-05 PROCEDURE — G0299 HHS/HOSPICE OF RN EA 15 MIN: HCPCS

## 2018-01-09 ENCOUNTER — HOME CARE VISIT (OUTPATIENT)
Dept: SCHEDULING | Facility: HOME HEALTH | Age: 58
End: 2018-01-09
Payer: COMMERCIAL

## 2018-01-09 VITALS
OXYGEN SATURATION: 97 % | RESPIRATION RATE: 18 BRPM | HEART RATE: 105 BPM | TEMPERATURE: 97.9 F | SYSTOLIC BLOOD PRESSURE: 128 MMHG | DIASTOLIC BLOOD PRESSURE: 86 MMHG

## 2018-01-09 PROCEDURE — G0300 HHS/HOSPICE OF LPN EA 15 MIN: HCPCS

## 2018-01-10 ENCOUNTER — OFFICE VISIT (OUTPATIENT)
Dept: FAMILY MEDICINE CLINIC | Age: 58
End: 2018-01-10

## 2018-01-10 VITALS
HEIGHT: 57 IN | SYSTOLIC BLOOD PRESSURE: 122 MMHG | RESPIRATION RATE: 15 BRPM | HEART RATE: 110 BPM | OXYGEN SATURATION: 94 % | DIASTOLIC BLOOD PRESSURE: 70 MMHG | BODY MASS INDEX: 29.12 KG/M2 | WEIGHT: 135 LBS | TEMPERATURE: 98.9 F

## 2018-01-10 DIAGNOSIS — R11.2 NAUSEA AND VOMITING, INTRACTABILITY OF VOMITING NOT SPECIFIED, UNSPECIFIED VOMITING TYPE: Primary | ICD-10-CM

## 2018-01-10 DIAGNOSIS — G89.29 OTHER CHRONIC PAIN: ICD-10-CM

## 2018-01-10 LAB
BARBITURATES UR POC: NEGATIVE
BENZODIAZEPINES UR POC: NEGATIVE
COCAINE QL URINE POC: NEGATIVE
LOT EXP DATE POC: NORMAL
LOT NUMBER POC: NORMAL
MARIJUANA (THC) QL URINE POC: NEGATIVE
MDMA/ECSTASY UR POC: NEGATIVE
METHADONE QL URINE POC: NEGATIVE
METHAMPHETAMINE QL URINE POC: NEGATIVE
NTI OTHER MICRO TRANSPORT 977598: NEGATIVE
OPIATES QL URINE POC: NORMAL
OXYCODONE UR POC: NORMAL
VALID INTERNAL CONTROL?: YES

## 2018-01-10 RX ORDER — ONDANSETRON 4 MG/1
4 TABLET, ORALLY DISINTEGRATING ORAL
Qty: 20 TAB | Refills: 1 | Status: SHIPPED | OUTPATIENT
Start: 2018-01-10

## 2018-01-10 NOTE — PROGRESS NOTES
Chief Complaint   Patient presents with    Vomiting     x's 4 days    Cough       Patient seen in the office today with spouse present for c/o of nausea and vomiting x's 1 week  Reports decreased appetite and elevated Bs's.   Also states coughing

## 2018-01-10 NOTE — PROGRESS NOTES
Chief Complaint   Patient presents with    Vomiting     x's 4 days    Cough     Patient seen in the office today with spouse present for c/o of nausea and vomiting x's 1 week  Reports decreased appetite and elevated Bs's. Also states coughing. Pt reports that her blood sugar has been in the 4-500s for several days, has not taken her insulin due to not being able to keep any food down. Subjective: (As above and below)     Chief Complaint   Patient presents with    Vomiting     x's 4 days    Cough     she is a 59y.o. year old female who presents for evaluation. Reviewed PmHx, RxHx, FmHx, SocHx, AllgHx and updated in chart. Review of Systems - negative except as listed above    Objective:     Vitals:    01/10/18 1356   BP: 122/70   Pulse: (!) 110   Resp: 15   Temp: 98.9 °F (37.2 °C)   TempSrc: Oral   SpO2: 94%   Weight: 135 lb (61.2 kg)   Height: 4' 9\" (1.448 m)     Physical Examination: General appearance - alert, well appearing, and in no distress  Mental status - normal mood, behavior, speech, dress, motor activity, and thought processes  Mouth - mucous membranes moist, pharynx normal without lesions  Chest - clear to auscultation, no wheezes, rales or rhonchi, symmetric air entry  Heart - normal rate, regular rhythm, normal S1, S2, no murmurs, rubs, clicks or gallops  Abdomen - upper abdominal tenderness, minimal exam possible due to pain and guarding  Musculoskeletal - in wheelchair    Assessment/ Plan:   1. Nausea and vomiting, intractability of vomiting not specified, unspecified vomiting type  -check labs, zofran as needed, stick to bland diet    Need to consider imaging if symptoms do not improve. I have discussed the diagnosis with the patient and the intended plan as seen in the above orders. The patient has received an after-visit summary and questions were answered concerning future plans.      Medication Side Effects and Warnings were discussed with patient: yes  Patient Labs were reviewed: yes  Patient Past Records were reviewed:  yes    Jackie Russell M.D.

## 2018-01-10 NOTE — MR AVS SNAPSHOT
Visit Information Date & Time Provider Department Dept. Phone Encounter #  
 1/10/2018  2:00 PM Dutch Lyons MD [address] [facility] [phone] [id number] Upcoming Health Maintenance Date Due  
 EYE EXAM RETINAL OR DILATED Q1 9/24/1968 COLONOSCOPY 9/24/1976 BREAST CANCER SCRN MAMMOGRAM 9/24/2008 MICROALBUMIN Q1 2/8/2017 FOOT EXAM Q1 3/4/2017 PAP AKA CERVICAL CYTOLOGY 6/19/2017 HEMOGLOBIN A1C Q6M 4/3/2018 LIPID PANEL Q1 10/3/2018 DTaP/Tdap/Td series (2 - Td) 8/29/2025 Allergies as of 1/10/2018  Review Complete On: 1/10/2018 By: Eric Wagoner MD  
  
 Severity Noted Reaction Type Reactions Potassium Medium 03/03/2016    Other (comments) Patient states she will refuse IV Potassium due to irritation-Patient will accept PO form. Anaprox [Naproxen Sodium]  10/05/2011    Other (comments) Blood pressure drops Tolerates ibuprofen Codeine  10/05/2011    Nausea and Vomiting Tolerates oxycodone/acetaminophen Mobic [Meloxicam]  10/05/2011    Other (comments) Blood pressure drops Tolerates ibuprofen Tylenol [Acetaminophen]  08/29/2015    Other (comments) \"Stomach hurts\", hx of Crohn's disease Current Immunizations  Reviewed on 10/3/2017 Name Date Influenza Vaccine 1/14/2014 Influenza Vaccine (Quad) PF 10/3/2017, 10/25/2016, 10/14/2015, 12/17/2014 Influenza Vaccine Split 10/4/2012, 10/5/2011 Pneumococcal Polysaccharide (PPSV-23) 1/14/2014 Tdap 8/29/2015  8:35 PM  
 ZZZ-RETIRED (DO NOT USE) Pneumococcal Vaccine (Unspecified Type) 10/5/2011 Not reviewed this visit You Were Diagnosed With   
  
 Codes Comments Nausea and vomiting, intractability of vomiting not specified, unspecified vomiting type    -  Primary ICD-10-CM: R11.2 ICD-9-CM: 787.01 Vitals BP Pulse Temp Resp Height(growth percentile) Weight(growth percentile) 122/70 (BP 1 Location: Left arm, BP Patient Position: Sitting) (!) 110 98.9 °F (37.2 °C) (Oral) 15 4' 9\" (1.448 m) 135 lb (61.2 kg) SpO2 BMI OB Status Smoking Status 94% 29.21 kg/m2 Hysterectomy Current Every Day Smoker Vitals History BMI and BSA Data Body Mass Index Body Surface Area  
 29.21 kg/m 2 1.57 m 2 Preferred Pharmacy Pharmacy Name Phone [facility] [phone] Your Updated Medication List  
  
   
This list is accurate as of: 1/10/18  2:42 PM.  Always use your most recent med list.  
  
  
  
  
 aspirin delayed-release 81 mg tablet Commonly known as:  ZKYOB-64 Take 1 Tab by mouth daily. diphenhydrAMINE 25 mg tablet Commonly known as:  BENADRYL Take 50 mg by mouth nightly as needed for Itching. docusate sodium 100 mg capsule Commonly known as:  Vita Ralls Take 100 mg by mouth two (2) times daily as needed for Constipation. fish oil-omega-3 fatty acids 340-1,000 mg capsule Take 1 Cap by mouth daily. fluconazole 150 mg tablet Commonly known as:  DIFLUCAN Take 1 Tab by mouth daily. Repeat in 3 days if needed. ibuprofen 800 mg tablet Commonly known as:  MOTRIN Take 1 Tab by mouth every eight (8) hours as needed for Pain. insulin glargine 100 unit/mL (3 mL) Inpn Commonly known as:  LANTUS,BASAGLAR  
35 Units by SubCUTAneous route daily. lisinopril 20 mg tablet Commonly known as:  PRINIVIL, ZESTRIL  
TAKE ONE TABLET BY MOUTH ONCE DAILY  
  
 metFORMIN 1,000 mg tablet Commonly known as:  GLUCOPHAGE Take 1 Tab by mouth two (2) times daily (with meals). ondansetron 4 mg disintegrating tablet Commonly known as:  ZOFRAN ODT Take 1 Tab by mouth every eight (8) hours as needed for Nausea. oxybutynin 5 mg tablet Commonly known as:  DITROPAN  
TAKE ONE TABLET BY MOUTH 4 TIMES DAILY oxyCODONE-acetaminophen 5-325 mg per tablet Commonly known as:  PERCOCET Take 1 Tab by mouth every four (4) hours as needed for Pain. Max Daily Amount: 6 Tabs. pioglitazone 30 mg tablet Commonly known as:  ACTOS Take 1 Tab by mouth daily. pravastatin 40 mg tablet Commonly known as:  PRAVACHOL Take 1 Tab by mouth daily. rOPINIRole 0.5 mg tablet Commonly known as:  Rodrigue Ifeoma Take 1 Tab by mouth nightly. VITAMIN D3 1,000 unit tablet Generic drug:  cholecalciferol Take 1,000 Units by mouth daily. Prescriptions Sent to Pharmacy Refills  
 ondansetron (ZOFRAN ODT) 4 mg disintegrating tablet 1 Sig: Take 1 Tab by mouth every eight (8) hours as needed for Nausea. Class: Normal  
 Pharmacy: [facility] [address] NICKOLAS ACOSTA [address] [place] [address] Ph #: [phone] Route: Oral  
  
We Performed the Following AMYLASE [id number] CPT(R)] CBC WITH AUTOMATED DIFF [80150 CPT(R)] LIPASE [id number] CPT(R)] METABOLIC PANEL, COMPREHENSIVE [49629 CPT(R)] To-Do List   
 01/12/2018 To Be Determined Appointment with Tiffany Mckinney LPN at [place] [address]  
  
 01/16/2018 To Be Determined Appointment with Tiffany Mckinney LPN at [place] [address]  
  
 01/19/2018 To Be Determined Appointment with Tiffany Mckinney LPN at [place] [address]  
  
 01/23/2018 To Be Determ[facility]  
  
 01/26/2018 To Be Determined Appointment with Tiffany Mckinney LPN at [place] [address]  
  
 01/30/2018 To Be Determined Appointment with Tiffany Mckinney LPN at [place] [address]  
  
 02/02/2018 To Be Determined Appointment with Tiffany Mckinney LPN at [place] [address]  
  
 02/06/2018 To Be Determined Appointment with Floresita Spar, LPN at [place] [address]  
  
 02/09/2018 To Be Determined Appointment with Tallulah Spar, LPN at [place] [address]  
  
 02/13/2018 To Be Determined Appointment with Floresita Spar, LPN at [place] [address]  
  
 02/16/2018 To Be Determined Appointment with Tallulah Spar, LPN at [place] [address]  
  
 02/20/2018 To Be Determined Appointment with Tallulah Spar, LPN at [place] [address]  
  
 02/23/2018 To Be Determined Appointment with Floresita Spar, LPN at [place] [address]  
  
 02/27/2018 To Be Determined Appointment with Tallulah Spar, LPN at [place] [address]  
  
 03/02/2018 To Be Determined Appointment with Tallulah Spar, LPN at [address]! Dear Divya Arguello: 
Thank you for requesting a [employer]stik account. Our records indicate that you already have an active [employer]stik account. You can access your account anytime at [email or web address] Did you know that you can access your hospital and ER discharge instructions at any time in Paystik? You can also review all of your test results from your hospital stay or ER visit. Additional Information If you have questions, please visit the Frequently Asked Questions section of the Paystik website at [email or web address] Remember, Paystik is NOT to be used for urgent needs. For medical emergencies, dial 911. Now available from your iPhone and Android! Please provide this summary of care documentation to your next provider. Your primary care clinician is listed as Janee Lyons. If you have any questions after today's visit, please call [phone].

## 2018-01-11 ENCOUNTER — TELEPHONE (OUTPATIENT)
Dept: FAMILY MEDICINE CLINIC | Age: 58
End: 2018-01-11

## 2018-01-11 ENCOUNTER — HOSPITAL ENCOUNTER (INPATIENT)
Age: 58
LOS: 3 days | Discharge: HOME HEALTH | DRG: 439 | End: 2018-01-16
Attending: EMERGENCY MEDICINE | Admitting: FAMILY MEDICINE
Payer: SELF-PAY

## 2018-01-11 ENCOUNTER — HOME CARE VISIT (OUTPATIENT)
Dept: HOME HEALTH SERVICES | Facility: HOME HEALTH | Age: 58
End: 2018-01-11
Payer: COMMERCIAL

## 2018-01-11 DIAGNOSIS — K85.90 ACUTE PANCREATITIS, UNSPECIFIED COMPLICATION STATUS, UNSPECIFIED PANCREATITIS TYPE: Primary | ICD-10-CM

## 2018-01-11 DIAGNOSIS — R11.10 VOMITING, INTRACTABILITY OF VOMITING NOT SPECIFIED, PRESENCE OF NAUSEA NOT SPECIFIED, UNSPECIFIED VOMITING TYPE: ICD-10-CM

## 2018-01-11 LAB
ALBUMIN SERPL-MCNC: 3.1 G/DL (ref 3.5–5)
ALBUMIN SERPL-MCNC: 3.9 G/DL (ref 3.5–5.5)
ALBUMIN/GLOB SERPL: 0.8 {RATIO} (ref 1.1–2.2)
ALBUMIN/GLOB SERPL: 1.4 {RATIO} (ref 1.2–2.2)
ALP SERPL-CCNC: 118 IU/L (ref 39–117)
ALP SERPL-CCNC: 135 U/L (ref 45–117)
ALT SERPL-CCNC: 30 IU/L (ref 0–32)
ALT SERPL-CCNC: 33 U/L (ref 12–78)
AMPHET UR QL SCN: NEGATIVE
AMYLASE SERPL-CCNC: 251 U/L (ref 31–124)
ANION GAP SERPL CALC-SCNC: 13 MMOL/L (ref 5–15)
APPEARANCE UR: ABNORMAL
AST SERPL-CCNC: 12 IU/L (ref 0–40)
AST SERPL-CCNC: 15 U/L (ref 15–37)
BACTERIA URNS QL MICRO: ABNORMAL /HPF
BARBITURATES UR QL SCN: NEGATIVE
BASOPHILS # BLD AUTO: 0 X10E3/UL (ref 0–0.2)
BASOPHILS # BLD: 0 K/UL (ref 0–0.1)
BASOPHILS NFR BLD AUTO: 0 %
BASOPHILS NFR BLD: 0 % (ref 0–1)
BENZODIAZ UR QL: NEGATIVE
BILIRUB SERPL-MCNC: 0.3 MG/DL (ref 0.2–1)
BILIRUB SERPL-MCNC: 0.3 MG/DL (ref 0–1.2)
BILIRUB UR QL: NEGATIVE
BUN SERPL-MCNC: 13 MG/DL (ref 6–24)
BUN SERPL-MCNC: 18 MG/DL (ref 6–20)
BUN/CREAT SERPL: 19 (ref 9–23)
BUN/CREAT SERPL: 23 (ref 12–20)
CALCIUM SERPL-MCNC: 9.3 MG/DL (ref 8.5–10.1)
CALCIUM SERPL-MCNC: 9.4 MG/DL (ref 8.7–10.2)
CANNABINOIDS UR QL SCN: NEGATIVE
CHLORIDE SERPL-SCNC: 91 MMOL/L (ref 96–106)
CHLORIDE SERPL-SCNC: 95 MMOL/L (ref 97–108)
CHOLEST SERPL-MCNC: 182 MG/DL
CO2 SERPL-SCNC: 18 MMOL/L (ref 18–29)
CO2 SERPL-SCNC: 22 MMOL/L (ref 21–32)
COCAINE UR QL SCN: NEGATIVE
COLOR UR: ABNORMAL
CREAT SERPL-MCNC: 0.67 MG/DL (ref 0.57–1)
CREAT SERPL-MCNC: 0.78 MG/DL (ref 0.55–1.02)
DRUG SCRN COMMENT,DRGCM: ABNORMAL
EOSINOPHIL # BLD AUTO: 0.3 X10E3/UL (ref 0–0.4)
EOSINOPHIL # BLD: 0.4 K/UL (ref 0–0.4)
EOSINOPHIL NFR BLD AUTO: 2 %
EOSINOPHIL NFR BLD: 3 % (ref 0–7)
EPITH CASTS URNS QL MICRO: ABNORMAL /LPF
ERYTHROCYTE [DISTWIDTH] IN BLOOD BY AUTOMATED COUNT: 13 % (ref 11.5–14.5)
ERYTHROCYTE [DISTWIDTH] IN BLOOD BY AUTOMATED COUNT: 13.3 % (ref 12.3–15.4)
EST. AVERAGE GLUCOSE BLD GHB EST-MCNC: 286 MG/DL
ETHANOL SERPL-MCNC: <10 MG/DL
GLOBULIN SER CALC-MCNC: 2.8 G/DL (ref 1.5–4.5)
GLOBULIN SER CALC-MCNC: 4.1 G/DL (ref 2–4)
GLUCOSE BLD STRIP.AUTO-MCNC: 224 MG/DL (ref 65–100)
GLUCOSE SERPL-MCNC: 491 MG/DL (ref 65–100)
GLUCOSE SERPL-MCNC: 502 MG/DL (ref 65–99)
GLUCOSE UR STRIP.AUTO-MCNC: >1000 MG/DL
HBA1C MFR BLD: 11.6 % (ref 4.2–6.3)
HCT VFR BLD AUTO: 39 % (ref 35–47)
HCT VFR BLD AUTO: 41.1 % (ref 34–46.6)
HDLC SERPL-MCNC: 22 MG/DL
HDLC SERPL: 8.3 {RATIO} (ref 0–5)
HGB BLD-MCNC: 13.1 G/DL (ref 11.5–16)
HGB BLD-MCNC: 13.7 G/DL (ref 11.1–15.9)
HGB UR QL STRIP: ABNORMAL
IMM GRANULOCYTES # BLD: 0 X10E3/UL (ref 0–0.1)
IMM GRANULOCYTES NFR BLD: 0 %
KETONES UR QL STRIP.AUTO: NEGATIVE MG/DL
LACTATE SERPL-SCNC: 0.9 MMOL/L (ref 0.4–2)
LDLC SERPL CALC-MCNC: 100.8 MG/DL (ref 0–100)
LEUKOCYTE ESTERASE UR QL STRIP.AUTO: NEGATIVE
LIPASE SERPL-CCNC: 386 U/L (ref 73–393)
LIPASE SERPL-CCNC: 801 U/L (ref 14–72)
LIPID PROFILE,FLP: ABNORMAL
LYMPHOCYTES # BLD AUTO: 1.3 X10E3/UL (ref 0.7–3.1)
LYMPHOCYTES # BLD: 2.5 K/UL (ref 0.8–3.5)
LYMPHOCYTES NFR BLD AUTO: 8 %
LYMPHOCYTES NFR BLD: 17 % (ref 12–49)
MCH RBC QN AUTO: 28.5 PG (ref 26–34)
MCH RBC QN AUTO: 29.2 PG (ref 26.6–33)
MCHC RBC AUTO-ENTMCNC: 33.3 G/DL (ref 31.5–35.7)
MCHC RBC AUTO-ENTMCNC: 33.6 G/DL (ref 30–36.5)
MCV RBC AUTO: 84.8 FL (ref 80–99)
MCV RBC AUTO: 88 FL (ref 79–97)
METHADONE UR QL: NEGATIVE
MONOCYTES # BLD AUTO: 0.4 X10E3/UL (ref 0.1–0.9)
MONOCYTES # BLD: 0.4 K/UL (ref 0–1)
MONOCYTES NFR BLD AUTO: 2 %
MONOCYTES NFR BLD: 3 % (ref 5–13)
NEUTROPHILS # BLD AUTO: 14.3 X10E3/UL (ref 1.4–7)
NEUTROPHILS NFR BLD AUTO: 88 %
NEUTS SEG # BLD: 11.1 K/UL (ref 1.8–8)
NEUTS SEG NFR BLD: 77 % (ref 32–75)
NITRITE UR QL STRIP.AUTO: POSITIVE
OPIATES UR QL: POSITIVE
PCP UR QL: NEGATIVE
PH UR STRIP: 5.5 [PH] (ref 5–8)
PLATELET # BLD AUTO: 294 X10E3/UL (ref 150–379)
PLATELET # BLD AUTO: 302 K/UL (ref 150–400)
POTASSIUM SERPL-SCNC: 3.4 MMOL/L (ref 3.5–5.1)
POTASSIUM SERPL-SCNC: 3.5 MMOL/L (ref 3.5–5.2)
PROT SERPL-MCNC: 6.7 G/DL (ref 6–8.5)
PROT SERPL-MCNC: 7.2 G/DL (ref 6.4–8.2)
PROT UR STRIP-MCNC: 30 MG/DL
RBC # BLD AUTO: 4.6 M/UL (ref 3.8–5.2)
RBC # BLD AUTO: 4.69 X10E6/UL (ref 3.77–5.28)
RBC #/AREA URNS HPF: ABNORMAL /HPF (ref 0–5)
SERVICE CMNT-IMP: ABNORMAL
SODIUM SERPL-SCNC: 129 MMOL/L (ref 134–144)
SODIUM SERPL-SCNC: 130 MMOL/L (ref 136–145)
SP GR UR REFRACTOMETRY: 1.01 (ref 1–1.03)
TRIGL SERPL-MCNC: 296 MG/DL (ref ?–150)
UROBILINOGEN UR QL STRIP.AUTO: 1 EU/DL (ref 0.2–1)
VLDLC SERPL CALC-MCNC: 59.2 MG/DL
WBC # BLD AUTO: 14.4 K/UL (ref 3.6–11)
WBC # BLD AUTO: 16.4 X10E3/UL (ref 3.4–10.8)
WBC URNS QL MICRO: ABNORMAL /HPF (ref 0–4)

## 2018-01-11 PROCEDURE — 80307 DRUG TEST PRSMV CHEM ANLYZR: CPT

## 2018-01-11 PROCEDURE — 87086 URINE CULTURE/COLONY COUNT: CPT | Performed by: EMERGENCY MEDICINE

## 2018-01-11 PROCEDURE — 99218 HC RM OBSERVATION: CPT

## 2018-01-11 PROCEDURE — 96365 THER/PROPH/DIAG IV INF INIT: CPT

## 2018-01-11 PROCEDURE — 96361 HYDRATE IV INFUSION ADD-ON: CPT

## 2018-01-11 PROCEDURE — 85025 COMPLETE CBC W/AUTO DIFF WBC: CPT | Performed by: STUDENT IN AN ORGANIZED HEALTH CARE EDUCATION/TRAINING PROGRAM

## 2018-01-11 PROCEDURE — 74011250636 HC RX REV CODE- 250/636: Performed by: EMERGENCY MEDICINE

## 2018-01-11 PROCEDURE — 81001 URINALYSIS AUTO W/SCOPE: CPT | Performed by: STUDENT IN AN ORGANIZED HEALTH CARE EDUCATION/TRAINING PROGRAM

## 2018-01-11 PROCEDURE — 99284 EMERGENCY DEPT VISIT MOD MDM: CPT

## 2018-01-11 PROCEDURE — 80061 LIPID PANEL: CPT

## 2018-01-11 PROCEDURE — 83036 HEMOGLOBIN GLYCOSYLATED A1C: CPT

## 2018-01-11 PROCEDURE — 87077 CULTURE AEROBIC IDENTIFY: CPT | Performed by: EMERGENCY MEDICINE

## 2018-01-11 PROCEDURE — 96372 THER/PROPH/DIAG INJ SC/IM: CPT

## 2018-01-11 PROCEDURE — 74011250636 HC RX REV CODE- 250/636: Performed by: FAMILY MEDICINE

## 2018-01-11 PROCEDURE — 87040 BLOOD CULTURE FOR BACTERIA: CPT

## 2018-01-11 PROCEDURE — 36415 COLL VENOUS BLD VENIPUNCTURE: CPT | Performed by: EMERGENCY MEDICINE

## 2018-01-11 PROCEDURE — 80053 COMPREHEN METABOLIC PANEL: CPT | Performed by: STUDENT IN AN ORGANIZED HEALTH CARE EDUCATION/TRAINING PROGRAM

## 2018-01-11 PROCEDURE — 82962 GLUCOSE BLOOD TEST: CPT

## 2018-01-11 PROCEDURE — 74011000258 HC RX REV CODE- 258: Performed by: FAMILY MEDICINE

## 2018-01-11 PROCEDURE — 83690 ASSAY OF LIPASE: CPT | Performed by: STUDENT IN AN ORGANIZED HEALTH CARE EDUCATION/TRAINING PROGRAM

## 2018-01-11 PROCEDURE — 83605 ASSAY OF LACTIC ACID: CPT

## 2018-01-11 PROCEDURE — 96375 TX/PRO/DX INJ NEW DRUG ADDON: CPT

## 2018-01-11 PROCEDURE — 74011636637 HC RX REV CODE- 636/637: Performed by: FAMILY MEDICINE

## 2018-01-11 PROCEDURE — 87186 SC STD MICRODIL/AGAR DIL: CPT | Performed by: EMERGENCY MEDICINE

## 2018-01-11 RX ORDER — INSULIN LISPRO 100 [IU]/ML
10 INJECTION, SOLUTION INTRAVENOUS; SUBCUTANEOUS
Status: COMPLETED | OUTPATIENT
Start: 2018-01-11 | End: 2018-01-11

## 2018-01-11 RX ORDER — MAGNESIUM SULFATE 100 %
4 CRYSTALS MISCELLANEOUS AS NEEDED
Status: DISCONTINUED | OUTPATIENT
Start: 2018-01-11 | End: 2018-01-16 | Stop reason: HOSPADM

## 2018-01-11 RX ORDER — INSULIN GLARGINE 100 [IU]/ML
35 INJECTION, SOLUTION SUBCUTANEOUS DAILY
Status: DISCONTINUED | OUTPATIENT
Start: 2018-01-12 | End: 2018-01-15

## 2018-01-11 RX ORDER — DEXTROSE 50 % IN WATER (D50W) INTRAVENOUS SYRINGE
12.5-25 AS NEEDED
Status: DISCONTINUED | OUTPATIENT
Start: 2018-01-11 | End: 2018-01-16 | Stop reason: HOSPADM

## 2018-01-11 RX ORDER — SODIUM CHLORIDE 0.9 % (FLUSH) 0.9 %
5-10 SYRINGE (ML) INJECTION EVERY 8 HOURS
Status: DISCONTINUED | OUTPATIENT
Start: 2018-01-11 | End: 2018-01-16 | Stop reason: HOSPADM

## 2018-01-11 RX ORDER — INSULIN LISPRO 100 [IU]/ML
INJECTION, SOLUTION INTRAVENOUS; SUBCUTANEOUS
Status: DISCONTINUED | OUTPATIENT
Start: 2018-01-11 | End: 2018-01-16 | Stop reason: HOSPADM

## 2018-01-11 RX ORDER — SODIUM CHLORIDE, SODIUM LACTATE, POTASSIUM CHLORIDE, CALCIUM CHLORIDE 600; 310; 30; 20 MG/100ML; MG/100ML; MG/100ML; MG/100ML
125 INJECTION, SOLUTION INTRAVENOUS CONTINUOUS
Status: DISCONTINUED | OUTPATIENT
Start: 2018-01-11 | End: 2018-01-16 | Stop reason: HOSPADM

## 2018-01-11 RX ORDER — ENOXAPARIN SODIUM 100 MG/ML
40 INJECTION SUBCUTANEOUS EVERY 24 HOURS
Status: DISCONTINUED | OUTPATIENT
Start: 2018-01-11 | End: 2018-01-16 | Stop reason: HOSPADM

## 2018-01-11 RX ORDER — ONDANSETRON 2 MG/ML
4 INJECTION INTRAMUSCULAR; INTRAVENOUS
Status: DISCONTINUED | OUTPATIENT
Start: 2018-01-11 | End: 2018-01-13

## 2018-01-11 RX ORDER — SODIUM CHLORIDE 9 MG/ML
100 INJECTION, SOLUTION INTRAVENOUS CONTINUOUS
Status: DISCONTINUED | OUTPATIENT
Start: 2018-01-11 | End: 2018-01-11

## 2018-01-11 RX ORDER — MORPHINE SULFATE 2 MG/ML
2 INJECTION, SOLUTION INTRAMUSCULAR; INTRAVENOUS
Status: COMPLETED | OUTPATIENT
Start: 2018-01-11 | End: 2018-01-11

## 2018-01-11 RX ORDER — HYDROMORPHONE HYDROCHLORIDE 2 MG/ML
0.2 INJECTION, SOLUTION INTRAMUSCULAR; INTRAVENOUS; SUBCUTANEOUS
Status: DISCONTINUED | OUTPATIENT
Start: 2018-01-11 | End: 2018-01-12

## 2018-01-11 RX ORDER — SODIUM CHLORIDE 0.9 % (FLUSH) 0.9 %
5-10 SYRINGE (ML) INJECTION AS NEEDED
Status: DISCONTINUED | OUTPATIENT
Start: 2018-01-11 | End: 2018-01-16 | Stop reason: HOSPADM

## 2018-01-11 RX ORDER — ONDANSETRON 2 MG/ML
4 INJECTION INTRAMUSCULAR; INTRAVENOUS
Status: COMPLETED | OUTPATIENT
Start: 2018-01-11 | End: 2018-01-11

## 2018-01-11 RX ADMIN — SODIUM CHLORIDE, SODIUM LACTATE, POTASSIUM CHLORIDE, AND CALCIUM CHLORIDE 250 ML/HR: 600; 310; 30; 20 INJECTION, SOLUTION INTRAVENOUS at 23:32

## 2018-01-11 RX ADMIN — SODIUM CHLORIDE 100 ML/HR: 900 INJECTION, SOLUTION INTRAVENOUS at 18:20

## 2018-01-11 RX ADMIN — MORPHINE SULFATE 2 MG: 2 INJECTION, SOLUTION INTRAMUSCULAR; INTRAVENOUS at 16:23

## 2018-01-11 RX ADMIN — ENOXAPARIN SODIUM 40 MG: 40 INJECTION SUBCUTANEOUS at 21:00

## 2018-01-11 RX ADMIN — INSULIN LISPRO 10 UNITS: 100 INJECTION, SOLUTION INTRAVENOUS; SUBCUTANEOUS at 20:41

## 2018-01-11 RX ADMIN — INSULIN LISPRO 2 UNITS: 100 INJECTION, SOLUTION INTRAVENOUS; SUBCUTANEOUS at 23:33

## 2018-01-11 RX ADMIN — HYDROMORPHONE HYDROCHLORIDE 0.2 MG: 2 INJECTION INTRAMUSCULAR; INTRAVENOUS; SUBCUTANEOUS at 20:42

## 2018-01-11 RX ADMIN — Medication 10 ML: at 23:32

## 2018-01-11 RX ADMIN — ONDANSETRON 4 MG: 2 INJECTION INTRAMUSCULAR; INTRAVENOUS at 16:23

## 2018-01-11 RX ADMIN — CEFTRIAXONE SODIUM 1 G: 1 INJECTION, POWDER, FOR SOLUTION INTRAMUSCULAR; INTRAVENOUS at 21:00

## 2018-01-11 RX ADMIN — SODIUM CHLORIDE 1000 ML: 900 INJECTION, SOLUTION INTRAVENOUS at 16:23

## 2018-01-11 NOTE — IP AVS SNAPSHOT
303 [facility] 104 1007 [facility] 
[phone] Patient: Jose J Nair MRN: [id number] About your hospitalization You were admitted on:  January 11, 2018 You last received care in the:  [facility]  MED SURG 2 You were discharged on:  January 16, 2018 Why you were hospitalized Your primary diagnosis was:  Pancreatitis Your diagnoses also included:  Acute Cystitis, Wound Of Left Ankle, Arterial Insufficiency With Ischemic Ulcer (Hcc) Follow-up Information Follow up With Details Comments Contact Info Anita Lyons MD Go on 1/18/2018 1:30 pm. transitional care appointment for acute pancreatitis  [address] [address] [phone] [facility] HOME CARE [place]  PT/OT, nursing for wound care and [address] 
1st Floor [facility] [address] 
[phone] Your Scheduled Appointments Thursday January 18, 2018  1:30 PM EST  
ESTABLISHED PATIENT with Teresa Armstrong MD  
[address] [facility] ([address]) [address] [address] [phone] Discharge Orders None A check flora indicates which time of day the medication should be taken. My Medications CHANGE how you take these medications Instructions Each Dose to Equal  
 Morning Noon Evening Bedtime  
 fluconazole 150 mg tablet Commonly known as:  DIFLUCAN What changed:  additional instructions Your last dose was: Your next dose is: Take 1 Tab by mouth daily. Repeat in 3 days if needed. 150 mg CONTINUE taking these medications Instructions Each Dose to Equal  
 Morning Noon Evening Bedtime  
 aspirin delayed-release 81 mg tablet Commonly known as:  GOONE-71 Your last dose was: Your next dose is: Take 1 Tab by mouth daily. 81 mg  
    
   
   
   
  
 diphenhydrAMINE 25 mg tablet Commonly known as:  BENADRYL Your last dose was: Your next dose is: Take 50 mg by mouth nightly as needed for Itching. 50 mg  
    
   
   
   
  
 docusate sodium 100 mg capsule Commonly known as:  Theola Duet Your last dose was: Your next dose is: Take 100 mg by mouth two (2) times daily as needed for Constipation. 100 mg  
    
   
   
   
  
 fish oil-omega-3 fatty acids 340-1,000 mg capsule Your last dose was: Your next dose is: Take 1 Cap by mouth daily. 1 Cap  
    
   
   
   
  
 ibuprofen 800 mg tablet Commonly known as:  MOTRIN Your last dose was: Your next dose is: Take 1 Tab by mouth every eight (8) hours as needed for Pain. 800 mg  
    
   
   
   
  
 lisinopril 20 mg tablet Commonly known as:  Alcario Apollo Your last dose was: Your next dose is: TAKE ONE TABLET BY MOUTH ONCE DAILY  
     
   
   
   
  
 metFORMIN 1,000 mg tablet Commonly known as:  GLUCOPHAGE Your last dose was: Your next dose is: Take 1 Tab by mouth two (2) times daily (with meals). 1000 mg NovoLOG Mix 70-30 100 unit/mL (70-30) injection Generic drug:  insulin aspart protamine/insulin aspart Your last dose was: Your next dose is:    
   
   
 10 Units by SubCUTAneous route Before breakfast and dinner. 10 Units  
    
   
   
   
  
 ondansetron 4 mg disintegrating tablet Commonly known as:  ZOFRAN ODT Your last dose was: Your next dose is: Take 1 Tab by mouth every eight (8) hours as needed for Nausea. 4 mg  
    
   
   
   
  
 oxybutynin 5 mg tablet Commonly known as:  UQFDNABO Your last dose was: Your next dose is: TAKE ONE TABLET BY MOUTH 4 TIMES DAILY oxyCODONE-acetaminophen 5-325 mg per tablet Commonly known as:  PERCOCET Your last dose was: Your next dose is: Take 1 Tab by mouth every four (4) hours as needed for Pain. Max Daily Amount: 6 Tabs. 1 Tab  
    
   
   
   
  
 pioglitazone 30 mg tablet Commonly known as:  ACTOS Your last dose was: Your next dose is: Take 1 Tab by mouth daily. 30 mg  
    
   
   
   
  
 pravastatin 40 mg tablet Commonly known as:  PRAVACHOL Your last dose was: Your next dose is: Take 1 Tab by mouth daily. 40 mg  
    
   
   
   
  
 rOPINIRole 0.5 mg tablet Commonly known as:  Twylla Ramus Your last dose was: Your next dose is: Take 1 Tab by mouth nightly. 0.5 mg  
    
   
   
   
  
 VITAMIN D3 1,000 unit tablet Generic drug:  cholecalciferol Your last dose was: Your next dose is: Take 1,000 Units by mouth daily. 1000 Units Discharge Instructions HOME DISCHARGE INSTRUCTIONS Gerry Parker / [id number] : 1960 Admission date: 2018 Discharge date: 2018 10:13 AM  
 
Please bring this form with you to show your care provider at your follow-up appointment. Primary care provider:  Josh Townsend MD 
 
Discharging provider:  Rickey Atkins MD  - Family Medicine Resident Brooks Gayle MD - Family Medicine Attending You have been admitted to the hospital with the following diagnoses: 
 
ACUTE DIAGNOSES: 
· Pancreatitis FOLLOW-UP CARE RECOMMENDATIONS:  
Please adhere to the diet recommendations made in this document. Appointments Follow-up Information Follow up With Details Comments Contact Info Mila Lyons MD In 3 days transitional care appointment for acute pancreatitis  [address] [place] [address] 
[phone] Follow-up tests needed: Pending test results: At the time of your discharge the following test results are still pending: None. Please make sure you review these results with your outpatient follow-up provider(s). Specific symptoms to watch for: chest pain, shortness of breath, fever, chills, nausea, vomiting, diarrhea, change in mentation, falling, weakness, bleeding. DIET/what to eat:  Diabetic Diet ACTIVITY:  Activity as tolerated Wound care: Per PCP Equipment needed:  None What to do if new or unexpected symptoms occur? If you experience any of the above symptoms (or should other concerns or questions arise after discharge) please call your primary care physician. Return to the emergency room if you cannot get hold of your doctor. · It is very important that you keep your follow-up appointment(s). · Please bring discharge papers, medication list (and/or medication bottles) to your follow-up appointments for review by your outpatient provider(s). · Please check the list of medications and be sure it includes every medication (even non-prescription medications) that your provider wants you to take. · It is important that you take the medication exactly as they are prescribed. · Keep your medication in the bottles provided by the pharmacist and keep a list of the medication names, dosages, and times to be taken in your wallet. · Do not take other medications without consulting your doctor. · If you have any questions about your medications or other instructions, please talk to your nurse or care provider before you leave the hospital.  
 
Information obtained by:  
 
I understand that if any problems occur once I am at home I am to contact my physician. These instructions were explained to me and I had the opportunity to ask questions. I understand and acknowledge receipt of the instructions indicated above. Physician's or R.N.'s Signature                                                                  Date/Time Patient or Representative Signature                                                          Date/Time Introducing [facility]! Dear Ginger Bland: 
Thank you for requesting a Goodwall account. Our records indicate that you already have an active Goodwall account. You can access your account anytime at [email or web address] Did you know that you can access your hospital and ER discharge instructions at any time in Goodwall? You can also review all of your test results from your hospital stay or ER visit. Additional Information If you have questions, please visit the Frequently Asked Questions section of the Goodwall website at [email or web address] Remember, Goodwall is NOT to be used for urgent needs. For medical emergencies, dial 911. Now available from your iPhone and Android! Unresulted Labs-Please follow up with your PCP about these lab tests Order Current Status CULTURE, BLOOD Preliminary result Providers Seen During Your Hospitalization Provider Specialty Primary office phone Jonny Boykin MD Emergency Medicine [phone] Marshall Sweeney MD Family Practice [phone] Elle Garcia MD Family Practice [phone] Your Primary Care Physician (PCP) Primary Care Physician Office Phone Office Fax Dk Jarrell [phone] You are allergic to the following Allergen Reactions Potassium Other (comments) Patient states she will refuse IV Potassium due to irritation-Patient will accept PO form. Anaprox (Naproxen Sodium) Other (comments) Blood pressure drops Tolerates ibuprofen Codeine Nausea and Vomiting Tolerates oxycodone/acetaminophen Mobic (Meloxicam) Other (comments) Blood pressure drops Tolerates ibuprofen Tylenol (Acetaminophen) Other (comments) \"Stomach hurts\", hx of Crohn's disease. Tolerates percocet. Recent Documentation Height Weight Breastfeeding? BMI OB Status Smoking Status 1.448 m 65.7 kg No 31.34 kg/m2 Hysterectomy Current Every Day Smoker Emergency Contacts Name Discharge Info Relation Home Work Mobile Scooter Martell DISCHARGE CAREGIVER [3] Spouse [phone] Patient Belongings The following personal items are in your possession at time of discharge: 
  Dental Appliances: None  Visual Aid: Glasses      Home Medications: None   Jewelry: Watch (With )  Clothing: At bedside, Pants, Shirt, Socks, Undergarments    Other Valuables: Cell Phone Discharge Instructions Attachments/References PANCREATITIS: CHRONIC DIET (ENGLISH) PANCREATITIS (ENGLISH) Patient Handouts Diet for Chronic Pancreatitis: Care Instructions Your Care Instructions The pancreas is an organ behind the stomach that makes hormones and enzymes to help your body digest food. Sometimes the enzymes attack another part of the pancreas, which can cause pain and swelling. This is called pancreatitis. Chronic pancreatitis may cause you to be in pain much of the time. You may be able to help the pain by avoiding alcohol and eating a low-fat diet. Your doctor and dietitian can help you make an eating plan that does not irritate your digestive system. Always talk with your doctor or dietitian before you make changes in your diet. Follow-up care is a key part of your treatment and safety. Be sure to make and go to all appointments, and call your doctor if you are having problems.  It's also a good idea to know your test results and keep a list of the medicines you take. How can you care for yourself at home? · Do not drink alcohol. It may make your pain worse and cause other problems. Tell your doctor if you need help to quit. Counseling, support groups, and sometimes medicines can help you stay sober. · Ask your doctor if you need to take pancreatic enzyme pills to help your body digest fat and protein. · Drink plenty of fluids, enough so that your urine is light yellow or clear like water. If you have kidney, heart, or liver disease and have to limit fluids, talk with your doctor before you increase the amount of fluids you drink. Eat a low-fat diet · Eat many small meals and snacks each day instead of three large meals. · Choose lean meats. ¨ Eat no more than 5 to 6½ ounces of meat a day. ¨ Cut off all fat you can see. ¨ Eat chicken and turkey without the skin. ¨ Many types of fish, such as salmon, lake trout, tuna, and herring, provide healthy omega-3 fat. But avoid fish canned in oil, such as sardines in olive oil. ¨ Bake, broil, or grill meats, poultry, or fish instead of frying them in butter or fat. · Drink or eat nonfat or low-fat milk, yogurt, cheese, or other milk products each day. ¨ Read the labels on cheeses, and choose those with less than 5 grams of fat an ounce. ¨ Try fat-free sour cream, cream cheese, or yogurt. ¨ Avoid cream soups and cream sauces on pasta. ¨ Eat low-fat ice cream, frozen yogurt, or sorbet. Avoid regular ice cream. 
· Eat whole-grain cereals, breads, crackers, rice, or pasta. Avoid high-fat foods such as croissants, scones, biscuits, waffles, doughnuts, muffins, granola, and high-fat breads. · Flavor your foods with herbs and spices (such as basil, tarragon, or mint), fat-free sauces, or lemon juice instead of butter. You can also use butter substitutes, fat-free mayonnaise, or fat-free dressing. · Try applesauce, prune puree, or mashed bananas to replace some or all of the fat when you bake. · Limit fats and oils, such as butter, margarine, mayonnaise, and salad dressing, to no more than 1 tablespoon a meal. 
· Avoid high-fat foods, such as: 
¨ Chocolate, whole milk, ice cream, processed cheese, and egg yolks. ¨ Fried or buttered foods. ¨ Sausage, salami, and vigil. ¨ Cinnamon rolls, cakes, pies, cookies, and other pastries. ¨ Prepared snack foods, such as potato chips, nut and granola bars, and mixed nuts. ¨ Coconut and avocado. · Learn how to read food labels for serving sizes and ingredients. Fast-food and convenience-food meals often have lots of fat. Where can you learn more? Go to http://rissa-tawny.info/. Enter B228 in the search box to learn more about \"Diet for Chronic Pancreatitis: Care Instructions. \" Current as of: May 12, 2017 Content Version: 11.4 © 7754-3500 [employer]. Care instructions adapted under license by [employer] (which disclaims liability or warranty for this information). If you have questions about a medical condition or this instruction, always ask your healthcare professional. [place] [address] any warranty or liability for your use of this information. Pancreatitis: Care Instructions Your Care Instructions The pancreas is an organ behind the stomach. It makes hormones and enzymes to help your body digest food. But if these enzymes attack the pancreas, it can get inflamed. This is called pancreatitis. Most cases are caused by gallstones or by heavy alcohol use. If you take care of yourself at home, it will help you get better. It will also help you avoid more problems with your pancreas. Follow-up care is a key part of your treatment and safety. Be sure to make and go to all appointments, and call your doctor if you are having problems. It's also a good idea to know your test results and keep a list of the medicines you take. How can you care for yourself at home? · Drink clear liquids and eat bland foods until you feel better. [place] foods include rice, dry toast, and crackers. They also include bananas and applesauce. · Eat a low-fat diet until your doctor says your pancreas is healed. · Do not drink alcohol. Tell your doctor if you need help to quit. Counseling, support groups, and sometimes medicines can help you stay sober. · Be safe with medicines. Read and follow all instructions on the label. ¨ If the doctor gave you a prescription medicine for pain, take it as prescribed. ¨ If you are not taking a prescription pain medicine, ask your doctor if you can take an over-the-counter medicine. · If your doctor prescribed antibiotics, take them as directed. Do not stop taking them just because you feel better. You need to take the full course of antibiotics. · Get extra rest until you feel better. To prevent future problems with your pancreas · Do not drink alcohol. · Tell your doctors and pharmacist that you've had pancreatitis. They can help you avoid medicines that may cause this problem again. When should you call for help? Call 911 anytime you think you may need emergency care. For example, call if: 
? · You vomit blood or what looks like coffee grounds. ? · Your stools are maroon or very bloody. ?Call your doctor now or seek immediate medical care if: 
? · You have new or worse belly pain. ? · Your stools are black and look like tar, or they have streaks of blood. ? · You are vomiting. ? Watch closely for changes in your health, and be sure to contact your doctor if: 
? · You do not get better as expected. Where can you learn more? Go to http://rissa-tawny.info/. Enter G171 in the search box to learn more about \"Pancreatitis: Care Instructions. \" Current as of: May 12, 2017 Content Version: 11.4 © 8804-8700 [employer].  Care instructions adapted under license by [address] (which disclaims liability or warranty for this information). If you have questions about a medical condition or this instruction, always ask your healthcare professional. [address] any warranty or liability for your use of this information. Please provide this summary of care documentation to your next provider. Signatures-by signing, you are acknowledging that this After Visit Summary has been reviewed with you and you have received a copy. Patient Signature:  ____________________________________________________________ Date:  ____________________________________________________________  
  
[facility] Provider Signature:  ____________________________________________________________ Date:  ____________________________________________________________

## 2018-01-11 NOTE — IP AVS SNAPSHOT
[address] 
[phone] Patient: Yamilex [facility] MRN: [id number] A check flora indicates which time of day the medication should be taken. My Medications CHANGE how you take these medications Instructions Each Dose to Equal  
 Morning Noon Evening Bedtime  
 fluconazole 150 mg tablet Commonly known as:  DIFLUCAN What changed:  additional instructions Your last dose was: Your next dose is: Take 1 Tab by mouth daily. Repeat in 3 days if needed. 150 mg CONTINUE taking these medications Instructions Each Dose to Equal  
 Morning Noon Evening Bedtime  
 aspirin delayed-release 81 mg tablet Commonly known as:  HLQWG-89 Your last dose was: Your next dose is: Take 1 Tab by mouth daily. 81 mg  
    
   
   
   
  
 diphenhydrAMINE 25 mg tablet Commonly known as:  BENADRYL Your last dose was: Your next dose is: Take 50 mg by mouth nightly as needed for Itching. 50 mg  
    
   
   
   
  
 docusate sodium 100 mg capsule Commonly known as:  Gibson Cabreraa Your last dose was: Your next dose is: Take 100 mg by mouth two (2) times daily as needed for Constipation. 100 mg  
    
   
   
   
  
 fish oil-omega-3 fatty acids 340-1,000 mg capsule Your last dose was: Your next dose is: Take 1 Cap by mouth daily. 1 Cap  
    
   
   
   
  
 ibuprofen 800 mg tablet Commonly known as:  MOTRIN Your last dose was: Your next dose is: Take 1 Tab by mouth every eight (8) hours as needed for Pain. 800 mg  
    
   
   
   
  
 lisinopril 20 mg tablet Commonly known as:  [employer] [place] Your last dose was: Your next dose is: TAKE ONE TABLET BY MOUTH ONCE DAILY metFORMIN 1,000 mg tablet Commonly known as:  GLUCOPHAGE Your last dose was: Your next dose is: Take 1 Tab by mouth two (2) times daily (with meals). 1000 mg NovoLOG Mix 70-30 100 unit/mL (70-30) injection Generic drug:  insulin aspart protamine/insulin aspart Your last dose was: Your next dose is:    
   
   
 10 Units by SubCUTAneous route Before breakfast and dinner. 10 Units  
    
   
   
   
  
 ondansetron 4 mg disintegrating tablet Commonly known as:  ZOFRAN ODT Your last dose was: Your next dose is: Take 1 Tab by mouth every eight (8) hours as needed for Nausea. 4 mg  
    
   
   
   
  
 oxybutynin 5 mg tablet Commonly known as:  LAFUKSOK Your last dose was: Your next dose is: TAKE ONE TABLET BY MOUTH 4 TIMES DAILY  
     
   
   
   
  
 oxyCODONE-acetaminophen 5-325 mg per tablet Commonly known as:  PERCOCET Your last dose was: Your next dose is: Take 1 Tab by mouth every four (4) hours as needed for Pain. Max Daily Amount: 6 Tabs. 1 Tab  
    
   
   
   
  
 pioglitazone 30 mg tablet Commonly known as:  ACTOS Your last dose was: Your next dose is: Take 1 Tab by mouth daily. 30 mg  
    
   
   
   
  
 pravastatin 40 mg tablet Commonly known as:  PRAVACHOL Your last dose was: Your next dose is: Take 1 Tab by mouth daily. 40 mg  
    
   
   
   
  
 rOPINIRole 0.5 mg tablet Commonly known as:  Juancho File Your last dose was: Your next dose is: Take 1 Tab by mouth nightly. 0.5 mg  
    
   
   
   
  
 VITAMIN D3 1,000 unit tablet Generic drug:  cholecalciferol Your last dose was: Your next dose is: Take 1,000 Units by mouth daily. 1000 Units

## 2018-01-11 NOTE — TELEPHONE ENCOUNTER
This writer placed a call to the patient due to critical glucose labs of 502 with repeat analysis . Patient stated \"im ok. I am on my way out the door\". Advised patient  to call back directly if there are further questions, or if symptoms associated with her bs's fail to improve or worsen.  Patient expressed understanding

## 2018-01-11 NOTE — ED PROVIDER NOTES
Patient is a 59 y.o. female presenting with abdominal pain. Abdominal Pain           59y F with hx of cerebral palsy, crohns, polio here from PMD for elevated lipase. Has been having epigastric abd pain x 1 week with vomiting and nausea. Not eating/drinking well. No hx of similar. No chest pain. No fever. No trouble breathing. Nothing makes sx's better or worse. No back or flank pain.     Past Medical History:   Diagnosis Date    Acid indigestion     or heartburn    Arthritis     scince childhood    Cerebral palsy ([address]     Crohn disease ([address]     Diabetes ([address]     Financial difficulties 10/4/2012    GERD (gastroesophageal reflux disease)     Hemorrhoids     Hernia of unspecified site of abdominal cavity without mention of obstruction or gangrene     Hypertension     Mitral regurgitation 10/5/2011    Muscle spasm 6/19/2014    chronic    PAD (peripheral artery disease) ([address] 6/19/2014    Polio     Primary osteoarthritis of both hips 8/9/2012    Skin disease, bullous     frequent    Spastic paralysis ([address]     Stiff joint     Stroke ([address]     Tobacco abuse 8/10/2012    Urine incontinence     controlling    Weakness     of an arm or leg       Past Surgical History:   Procedure Laterality Date    HX APPENDECTOMY      pt states surgery at 27 y.o    HX GYN  1995    hysterectomy    HX HEENT      toncillectomy childhood    HX ORTHOPAEDIC      Bone transplants, tendons stretched, legs turned    HX TRANSPLANT      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     MUSCLE-SKIN FLAP,LEG      [place] BONE MARROW HARVEST TRANSPLANTATION ALLOGENEIC           Family History:   Problem Relation Age of Onset    Diabetes Mother     Alcohol abuse Mother     Heart Disease Mother     Hypertension Mother     Cancer Father     Alcohol abuse Father     Diabetes Maternal Grandmother     Stroke Maternal Grandmother        Social History     Social History    Marital status:      Spouse name: N/A    Number of children: N/A    Years of education: N/A     Occupational History    Not on file. Social History Main Topics    Smoking status: Current Every Day Smoker     Packs/day: 1.50     Years: 38.00    Smokeless tobacco: Former User    Alcohol use No    Drug use: No    Sexual activity: Yes     Partners: Male     Other Topics Concern    Not on file     Social History Narrative         ALLERGIES: Potassium; Anaprox [naproxen sodium]; Codeine; Mobic [meloxicam]; and Tylenol [acetaminophen]    Review of Systems   Gastrointestinal: Positive for abdominal pain. Review of Systems   Constitutional: (-) weight loss. HEENT: (-) stiff neck   Eyes: (-) discharge. Respiratory: (-) for cough. Cardiovascular: (-) syncope. Gastrointestinal: (-) blood in stool. Genitourinary: (-) hematuria. Musculoskeletal: (-) myalgias. Neurological: (-) seizure. Skin: (-) petechiae  Lymph/Immunologic: (-) enlarged lymph nodes  All other systems reviewed and are negative. Vitals:    01/11/18 1438   BP: 106/69   Pulse: (!) 105   Resp: 17   Temp: 98.2 °F (36.8 °C)   SpO2: 100%   Weight: 61.2 kg (135 lb)   Height: 4' 9\" (1.448 m)            Physical Exam Nursing note and vitals reviewed. Constitutional: oriented to person, place, and time. appears elderly and frail. No distress. Head: Normocephalic and atraumatic. Sclera anicteric  Nose: No rhinorrhea  Mouth/Throat: Oropharynx is clear and moist. Pharynx normal  Eyes: Conjunctivae are normal. Pupils are equal, round, and reactive to light. Right eye exhibits no discharge. Left eye exhibits no discharge. Neck: Painless normal range of motion. Neck supple. No LAD. Cardiovascular: Normal rate, regular rhythm, normal heart sounds and intact distal pulses. Exam reveals no gallop and no friction rub. No murmur heard. Pulmonary/Chest:  No respiratory distress. No wheezes. No rales. No rhonchi. No increased work of breathing. No accessory muscle use.  Good air exchange throughout. Abdominal: soft, tender to palpation in the epigastric region, no rebound or guarding. No hepatosplenomegaly. Normal bowel sounds throughout. Back: no tenderness to palpation, no deformities, no CVA tenderness  Extremities/Musculoskeletal: Normal range of motion in upper ext. no tenderness. No edema. Distal extremities with good perfusion and spastic paralysis (baseline). Lymphadenopathy:   No adenopathy. Neurological:  Alert and oriented to person, place, and time. Coordination normal. CN 2-12 intact. Motor and sensory function intact. Skin: Skin is warm and dry. No rash noted. No pallor. MDM 59y F here with new onset pancreatitis. Sent here by PMD after labs came back abnormal. Will give fluids, zofran, morphine, recheck labs and d/w FP.     ED Course       Procedures

## 2018-01-11 NOTE — PROGRESS NOTES
Please advise pt that labs show acute pancreatitis, hospitalization is recommended if pt is not able to keep foods and fluids in her system.    Elevated WBC indicates active infection  Please inform

## 2018-01-11 NOTE — PROGRESS NOTES
Pt idx3 notified and verbalized understanding. Pt states she will go to [address] [place] [address].

## 2018-01-12 ENCOUNTER — HOME CARE VISIT (OUTPATIENT)
Dept: HOME HEALTH SERVICES | Facility: HOME HEALTH | Age: 58
End: 2018-01-12
Payer: COMMERCIAL

## 2018-01-12 PROBLEM — N30.00 ACUTE CYSTITIS: Status: ACTIVE | Noted: 2018-01-12

## 2018-01-12 LAB
ANION GAP SERPL CALC-SCNC: 10 MMOL/L (ref 5–15)
BUN SERPL-MCNC: 18 MG/DL (ref 6–20)
BUN/CREAT SERPL: 28 (ref 12–20)
CALCIUM SERPL-MCNC: 8.4 MG/DL (ref 8.5–10.1)
CHLORIDE SERPL-SCNC: 103 MMOL/L (ref 97–108)
CO2 SERPL-SCNC: 22 MMOL/L (ref 21–32)
CREAT SERPL-MCNC: 0.64 MG/DL (ref 0.55–1.02)
ERYTHROCYTE [DISTWIDTH] IN BLOOD BY AUTOMATED COUNT: 12.7 % (ref 11.5–14.5)
GLUCOSE BLD STRIP.AUTO-MCNC: 244 MG/DL (ref 65–100)
GLUCOSE BLD STRIP.AUTO-MCNC: 245 MG/DL (ref 65–100)
GLUCOSE BLD STRIP.AUTO-MCNC: 277 MG/DL (ref 65–100)
GLUCOSE BLD STRIP.AUTO-MCNC: 323 MG/DL (ref 65–100)
GLUCOSE SERPL-MCNC: 234 MG/DL (ref 65–100)
HCT VFR BLD AUTO: 33.4 % (ref 35–47)
HGB BLD-MCNC: 11.5 G/DL (ref 11.5–16)
MCH RBC QN AUTO: 28.2 PG (ref 26–34)
MCHC RBC AUTO-ENTMCNC: 34.4 G/DL (ref 30–36.5)
MCV RBC AUTO: 81.9 FL (ref 80–99)
PLATELET # BLD AUTO: 277 K/UL (ref 150–400)
POTASSIUM SERPL-SCNC: 3.4 MMOL/L (ref 3.5–5.1)
RBC # BLD AUTO: 4.08 M/UL (ref 3.8–5.2)
SERVICE CMNT-IMP: ABNORMAL
SODIUM SERPL-SCNC: 135 MMOL/L (ref 136–145)
WBC # BLD AUTO: 11.9 K/UL (ref 3.6–11)

## 2018-01-12 PROCEDURE — 99218 HC RM OBSERVATION: CPT

## 2018-01-12 PROCEDURE — 80048 BASIC METABOLIC PNL TOTAL CA: CPT

## 2018-01-12 PROCEDURE — 74011250636 HC RX REV CODE- 250/636: Performed by: FAMILY MEDICINE

## 2018-01-12 PROCEDURE — 36415 COLL VENOUS BLD VENIPUNCTURE: CPT

## 2018-01-12 PROCEDURE — G8988 SELF CARE GOAL STATUS: HCPCS

## 2018-01-12 PROCEDURE — 74011636637 HC RX REV CODE- 636/637: Performed by: FAMILY MEDICINE

## 2018-01-12 PROCEDURE — 97116 GAIT TRAINING THERAPY: CPT

## 2018-01-12 PROCEDURE — 74011000258 HC RX REV CODE- 258: Performed by: FAMILY MEDICINE

## 2018-01-12 PROCEDURE — 74011250637 HC RX REV CODE- 250/637: Performed by: STUDENT IN AN ORGANIZED HEALTH CARE EDUCATION/TRAINING PROGRAM

## 2018-01-12 PROCEDURE — G8987 SELF CARE CURRENT STATUS: HCPCS

## 2018-01-12 PROCEDURE — 82962 GLUCOSE BLOOD TEST: CPT

## 2018-01-12 PROCEDURE — G8989 SELF CARE D/C STATUS: HCPCS

## 2018-01-12 PROCEDURE — 97165 OT EVAL LOW COMPLEX 30 MIN: CPT

## 2018-01-12 PROCEDURE — 85027 COMPLETE CBC AUTOMATED: CPT

## 2018-01-12 PROCEDURE — 97161 PT EVAL LOW COMPLEX 20 MIN: CPT

## 2018-01-12 PROCEDURE — 77030011254 HC DRSG HYDRGEL S&N -A

## 2018-01-12 PROCEDURE — 97535 SELF CARE MNGMENT TRAINING: CPT

## 2018-01-12 RX ORDER — INSULIN ASPART 100 [IU]/ML
10 INJECTION, SUSPENSION SUBCUTANEOUS
COMMUNITY

## 2018-01-12 RX ORDER — HYDROMORPHONE HYDROCHLORIDE 2 MG/ML
1 INJECTION, SOLUTION INTRAMUSCULAR; INTRAVENOUS; SUBCUTANEOUS
Status: DISCONTINUED | OUTPATIENT
Start: 2018-01-12 | End: 2018-01-13

## 2018-01-12 RX ORDER — POTASSIUM CHLORIDE 1.5 G/1.77G
40 POWDER, FOR SOLUTION ORAL
Status: DISCONTINUED | OUTPATIENT
Start: 2018-01-12 | End: 2018-01-12

## 2018-01-12 RX ORDER — POTASSIUM CHLORIDE 750 MG/1
40 TABLET, FILM COATED, EXTENDED RELEASE ORAL ONCE
Status: COMPLETED | OUTPATIENT
Start: 2018-01-12 | End: 2018-01-12

## 2018-01-12 RX ADMIN — INSULIN GLARGINE 35 UNITS: 100 INJECTION, SOLUTION SUBCUTANEOUS at 10:23

## 2018-01-12 RX ADMIN — HYDROMORPHONE HYDROCHLORIDE 0.2 MG: 2 INJECTION INTRAMUSCULAR; INTRAVENOUS; SUBCUTANEOUS at 10:19

## 2018-01-12 RX ADMIN — ONDANSETRON 4 MG: 2 INJECTION INTRAMUSCULAR; INTRAVENOUS at 14:47

## 2018-01-12 RX ADMIN — SODIUM CHLORIDE, SODIUM LACTATE, POTASSIUM CHLORIDE, AND CALCIUM CHLORIDE 200 ML/HR: 600; 310; 30; 20 INJECTION, SOLUTION INTRAVENOUS at 15:29

## 2018-01-12 RX ADMIN — INSULIN LISPRO 4 UNITS: 100 INJECTION, SOLUTION INTRAVENOUS; SUBCUTANEOUS at 07:30

## 2018-01-12 RX ADMIN — INSULIN LISPRO 10 UNITS: 100 INJECTION, SOLUTION INTRAVENOUS; SUBCUTANEOUS at 12:32

## 2018-01-12 RX ADMIN — INSULIN LISPRO 4 UNITS: 100 INJECTION, SOLUTION INTRAVENOUS; SUBCUTANEOUS at 22:07

## 2018-01-12 RX ADMIN — CEFEPIME HYDROCHLORIDE 2 G: 2 INJECTION, POWDER, FOR SOLUTION INTRAVENOUS at 17:21

## 2018-01-12 RX ADMIN — POTASSIUM CHLORIDE 40 MEQ: 750 TABLET, FILM COATED, EXTENDED RELEASE ORAL at 13:40

## 2018-01-12 RX ADMIN — Medication 10 ML: at 13:42

## 2018-01-12 RX ADMIN — INSULIN LISPRO 4 UNITS: 100 INJECTION, SOLUTION INTRAVENOUS; SUBCUTANEOUS at 17:19

## 2018-01-12 RX ADMIN — HYDROMORPHONE HYDROCHLORIDE 0.2 MG: 2 INJECTION INTRAMUSCULAR; INTRAVENOUS; SUBCUTANEOUS at 04:52

## 2018-01-12 RX ADMIN — Medication 10 ML: at 21:22

## 2018-01-12 RX ADMIN — SODIUM CHLORIDE, SODIUM LACTATE, POTASSIUM CHLORIDE, AND CALCIUM CHLORIDE 250 ML/HR: 600; 310; 30; 20 INJECTION, SOLUTION INTRAVENOUS at 10:56

## 2018-01-12 RX ADMIN — SODIUM CHLORIDE, SODIUM LACTATE, POTASSIUM CHLORIDE, AND CALCIUM CHLORIDE 200 ML/HR: 600; 310; 30; 20 INJECTION, SOLUTION INTRAVENOUS at 21:20

## 2018-01-12 RX ADMIN — HYDROMORPHONE HYDROCHLORIDE 0.2 MG: 2 INJECTION INTRAMUSCULAR; INTRAVENOUS; SUBCUTANEOUS at 00:43

## 2018-01-12 RX ADMIN — HYDROMORPHONE HYDROCHLORIDE 0.2 MG: 2 INJECTION INTRAMUSCULAR; INTRAVENOUS; SUBCUTANEOUS at 20:41

## 2018-01-12 RX ADMIN — HYDROMORPHONE HYDROCHLORIDE 0.2 MG: 2 INJECTION INTRAMUSCULAR; INTRAVENOUS; SUBCUTANEOUS at 14:47

## 2018-01-12 RX ADMIN — ENOXAPARIN SODIUM 40 MG: 40 INJECTION SUBCUTANEOUS at 20:41

## 2018-01-12 NOTE — PROGRESS NOTES
[facility]   Daily Progress Note    Date: 1/12/2018    Assessment/Plan:     Rome Ward is a 59 y.o. female who is admitted for acute pancreatitis in the setting of uncontrolled diabetes and UTI     24 Hr Events: No acute events overnight. Pancreatitis  Patient has no previous Hx of pancreatitis, and had past cholecystectomy. Abd. Pain and N/V x 1 week, lipase at PCP office was 801, down to 386  - Tolerated clear liquid diet this am. Will advance as needed. - Continue LR @ 250 mL/hr  - Continue Dilaudid 0.2mg q4h IV for pain control  - Continue Zofran 4mg IV q4h  - UDS pending     UTI  - Pt. Asymptomatic, UA with 2+ Bacteria and Nitrites  - Prelim cultures with G- rods. Follow final cultures  - Cefepime while awaiting final cultures     Diabetic Foot Ulcer  Outpatient wound care by PCP and Home Health per patient, currently wound is dressed, patient repeatedly refused to allow for examination of wound and LLE to assess for infection, in setting of 2/4 SIRS criteria (, WBC 14.4). Has had multiple past hospitalizations for left foot wound infections and associated LLE cellulitis. -Met SIRS criteria on admission, LA wnl, blood cultures pending  -Wound care and podiatry consulted  -Risk management on consult.     Hypertension - stable  - Will resume home BP meds (Lisinopril) as BPs trend. - Will continue to monitor at this time and readjust as BP's trend.     Diabetes Mellitus T2  Per chart review, patient has previously uncontrolled diabetes, on Lantus daily.    - A1C this admission of 11.6  - Discontinued home medications of Metformin and Actos. - Continue home Lantus 35U units qhs  - Insulin Sliding Scale normal sensitivity with AC&HS glucose checks. - Hypoglycemia protocols ordered.     Cerebral Palsy  -Continue home Requip     FEN/GI - NPO. LR at 250 mL/hr.   Activity - Out of bed with assistance  DVT prophylaxis - Lovenox  GI prophylaxis - Protonix  Fall prophylaxis - Not indicated at this time. Disposition -Plan to d/c to Home. Consulting PT/OT/CM  Code Status - DNR, discussed with patient / caregivers. Next of Lenard [address] Name and Jacques ()- Will be staying with patient for entire stay     Patient Mora Lieberman will be discussed Dr. Darryl Martin. Yokasta Rousseau MD  Family Medicine Resident         CC:     Subjective  No acute events overnight. Inpatient Medications  Current Facility-Administered Medications   Medication Dose Route Frequency    cefepime (MAXIPIME) 1 g in 0.9% sodium chloride (MBP/ADV) 50 mL  1 g IntraVENous Q12H    sodium chloride (NS) flush 5-10 mL  5-10 mL IntraVENous Q8H    sodium chloride (NS) flush 5-10 mL  5-10 mL IntraVENous PRN    HYDROmorphone (DILAUDID) injection 0.2 mg  0.2 mg IntraVENous Q4H PRN    ondansetron (ZOFRAN) injection 4 mg  4 mg IntraVENous Q4H PRN    enoxaparin (LOVENOX) injection 40 mg  40 mg SubCUTAneous Q24H    insulin glargine (LANTUS) injection 35 Units  35 Units SubCUTAneous DAILY    insulin lispro (HUMALOG) injection   SubCUTAneous AC&HS    glucose chewable tablet 16 g  4 Tab Oral PRN    dextrose (D50W) injection syrg 12.5-25 g  12.5-25 g IntraVENous PRN    glucagon (GLUCAGEN) injection 1 mg  1 mg IntraMUSCular PRN    lactated Ringers infusion  200 mL/hr IntraVENous CONTINUOUS         Allergies  Allergies   Allergen Reactions    Potassium Other (comments)     Patient states she will refuse IV Potassium due to irritation-Patient will accept PO form.     Anaprox [Naproxen Sodium] Other (comments)     Blood pressure drops  Tolerates ibuprofen    Codeine Nausea and Vomiting     Tolerates oxycodone/acetaminophen    Mobic [Meloxicam] Other (comments)     Blood pressure drops  Tolerates ibuprofen    Tylenol [Acetaminophen] Other (comments)     \"Stomach hurts\", hx of Crohn's disease          Objective  Vitals:  Patient Vitals for the past 8 hrs:   Temp Pulse Resp BP SpO2 01/12/18 1133 98.1 °F (36.7 °C) 89 17 133/78 92 %   01/12/18 0735 97.8 °F (36.6 °C) 85 18 159/90 95 %         I/O:    Intake/Output Summary (Last 24 hours) at 01/12/18 1506  Last data filed at 01/12/18 0744   Gross per 24 hour   Intake          2050.01 ml   Output              400 ml   Net          1650.01 ml     Last shift:    01/12 0701 - 01/12 1900  In: 429.2 [I.V.:429.2]  Out: -   Last 3 shifts:    01/10 1901 - 01/12 0700  In: 1620.8 [I.V.:1620.8]  Out: 400 [Urine:400]    Physical Exam:  General: No acute distress. Alert. Cooperative. Head: Normocephalic. Atraumatic. Eyes:  Conjunctiva pink. Sclera white. PERRL. Nose:  Septum midline. Mucosa pink. No drainage. Throat: Mucosa pink. Moist mucous membranes. No tonsillar exudates or erythema. Palate movement equal bilaterally. Respiratory: CTAB. No w/r/r/c.   Cardiovascular: RRR. Normal S1,S2. No m/r/g. Pulses 2+ throughout. GI: + bowel sounds. Nontender. No rebound tenderness or guarding. Non distended. Midline incisional hernia. Extremities: No edema. No palpable cord. No tenderness. Left foot wound dressing in place.      Laboratory Data  Recent Results (from the past 12 hour(s))   GLUCOSE, POC    Collection Time: 01/12/18  7:34 AM   Result Value Ref Range    Glucose (POC) 244 (H) 65 - 100 mg/dL    Performed by Rosey Ding (PCT)    GLUCOSE, POC    Collection Time: 01/12/18 11:57 AM   Result Value Ref Range    Glucose (POC) 323 (H) 65 - 100 mg/dL    Performed by Raisa Sánchez  PCT        Imaging  None      Hospital Problems:  Hospital Problems  Date Reviewed: 1/10/2018          Codes Class Noted POA    Pancreatitis ICD-10-CM: K85.90  ICD-9-CM: 153.2  1/11/2018 Unknown

## 2018-01-12 NOTE — PROGRESS NOTES
Bedside and Verbal shift change report given to Casey Nelson RN (oncoming nurse) by Mikel Lam RN (offgoing nurse). Report included the following information SBAR and Kardex.

## 2018-01-12 NOTE — CONSULTS
Bree Paz, [place] - Laren Crigler K. Leighton Maier, [address] [facility], [facility]                                                 Podiatric Surgery Consultation  Assessment/Plan:     Left lateral foot ulceration     - Patient refusing evaluation.   - Discussed with patient, we will be unable to help her without a thorough evaluation. Patient states she is still deciding.  - Dressing intact. No strike trough. - Thank you for this consultation. Subjective:  59 y.o. female with known cerebral palsy, IDDM, GERD, Crohn's, Hx of  polio who presents to the [facility] ER yesterday complaining of abdominal pain and nausea for the past week. She notes that her last BM was yesterday. She complains of diffuse abdomen pain. Patient had an ulceration on her left lateral ankle ulceration since 2014. Has been treated on and off at the wound care center with no definite resolution. States it is now being treated by PCP for the ulcerations. Denies any N/F/V/C/SOB. History:  Pancreatitis  Allergies   Allergen Reactions    Potassium Other (comments)     Patient states she will refuse IV Potassium due to irritation-Patient will accept PO form.     Anaprox [Naproxen Sodium] Other (comments)     Blood pressure drops  Tolerates ibuprofen    Codeine Nausea and Vomiting     Tolerates oxycodone/acetaminophen    Mobic [Meloxicam] Other (comments)     Blood pressure drops  Tolerates ibuprofen    Tylenol [Acetaminophen] Other (comments)     \"Stomach hurts\", hx of Crohn's disease      Family History   Problem Relation Age of Onset    Diabetes Mother     Alcohol abuse Mother     Heart Disease Mother     Hypertension Mother     Cancer Father     Alcohol abuse Father     Diabetes Maternal Grandmother     Stroke Maternal Grandmother       Past Medical History:   Diagnosis Date    Acid indigestion     or heartburn    Arthritis     scince childhood    [facility]     Crohn disease ([facility]  Diabetes ([facility]     Financial difficulties 10/4/2012    GERD (gastroesophageal reflux disease)     Hemorrhoids     Hernia of unspecified site of abdominal cavity without mention of obstruction or gangrene     Hypertension     Mitral regurgitation 10/5/2011    Muscle spasm 6/19/2014    chronic    PAD (peripheral artery disease) ([facility] 6/19/2014    Polio     Primary osteoarthritis of both hips 8/9/2012    Skin disease, bullous     frequent    Spastic paralysis ([facility])     Stiff joint     Stroke ([facility]     Tobacco abuse 8/10/2012    Urine incontinence     controlling    Weakness     of an arm or leg     Past Surgical History:   Procedure Laterality Date    HX APPENDECTOMY      pt states surgery at 27 y.o    HX GYN  1995    hysterectomy    HX HEENT      toncillectomy childhood    HX ORTHOPAEDIC      Bone transplants, tendons stretched, legs turned    HX TRANSPLANT      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     MUSCLE-SKIN FLAP,LEG      [place] BONE MARROW HARVEST TRANSPLANTATION ALLOGENEIC       Social History   Substance Use Topics    Smoking status: Current Every Day Smoker     Packs/day: 1.50     Years: 38.00    Smokeless tobacco: Former User    Alcohol use No       History   Alcohol Use No     History   Drug Use No      History   Smoking Status    Current Every Day Smoker    Packs/day: 1.50    Years: 38.00   Smokeless Tobacco    Former User     Current Facility-Administered Medications   Medication Dose Route Frequency    cefepime (MAXIPIME) 2 g in 0.9% sodium chloride (MBP/ADV) 50 mL  2 g IntraVENous Q12H    sodium chloride (NS) flush 5-10 mL  5-10 mL IntraVENous Q8H    sodium chloride (NS) flush 5-10 mL  5-10 mL IntraVENous PRN    HYDROmorphone (DILAUDID) injection 0.2 mg  0.2 mg IntraVENous Q4H PRN    ondansetron (ZOFRAN) injection 4 mg  4 mg IntraVENous Q4H PRN    enoxaparin (LOVENOX) injection 40 mg  40 mg SubCUTAneous Q24H    insulin glargine (LANTUS) injection 35 Units  35 Units SubCUTAneous DAILY    insulin lispro (HUMALOG) injection   SubCUTAneous AC&HS    glucose chewable tablet 16 g  4 Tab Oral PRN    dextrose (D50W) injection syrg 12.5-25 g  12.5-25 g IntraVENous PRN    glucagon (GLUCAGEN) injection 1 mg  1 mg IntraMUSCular PRN    lactated Ringers infusion  200 mL/hr IntraVENous CONTINUOUS        Objective:  Vitals: Patient Vitals for the past 12 hrs:   BP Temp Pulse Resp SpO2 Weight   01/12/18 1133 133/78 98.1 °F (36.7 °C) 89 17 92 % -   01/12/18 0740 - - - - - 67.6 kg (149 lb 1.6 oz)   01/12/18 0735 159/90 97.8 °F (36.6 °C) 85 18 95 % -   01/12/18 0449 124/79 98.3 °F (36.8 °C) 85 16 98 % -       Vascular:  RLE  DP 2/4; PT 2/4  capillary fill time brisk, pitting edema is abseont, skin temperature is cool, varicosities are present. Dermatological:  Nails are thickened, elongated, discolored,  3mm thick, with subungual debris. B/L LE     Skin is dry and scaly. Unable to assess LLE ulceration     Neurological:  patient is AAOx3, mood is normal. Epicritic sensation is intact. Orthopedic:  B/L LE are asymmetric, ROM of RLE ankle, STJ, 1st MTPJ is limited, RLE MMT 5 out of 5 for B/L LE.    LLE unable to assess. Constitutional: Pt is a well developed elderly obese female. Lymphatics: negative tenderness to palpation of neck/axillary/inguinal nodes. Imaging / Cx / Px:  LLE XR- questionable osteo of the heel.      Labs:  Recent Labs      01/12/18   0038   WBC  11.9*   CREA  0.64   BUN  18   GLU  234*   HGB  11.5   HCT  33.4*   NA  135*   K  3.4*   CL  103   CO2  22

## 2018-01-12 NOTE — PROGRESS NOTES
If pt is discharged over the weekend, nursing please notify [address] at [phone]. Thank you.   Lissa De LCSW

## 2018-01-12 NOTE — PROGRESS NOTES
CM met with pt and her  for initial assessment and discharge planning. Pt lives with her  and brother in a one story house with four entry steps. Pt is currently open with [id number] for nursing and will need a resumption order prior to discharge. A preliminary referral has been sent in [facility]. Pt is uninsured and reportedly applied for the [facility] back in July. [facility] has been notified. DME at home includes a rollator, w/c, shower chair, transfer chair and bsc. Pt does not have insurance coverage and was given a discount prescription drug card. Her pharmacy is [facility] in [place]. PCP is Dr. Ady Lyons. Nurse navigator, [facility], was informed of pt's hospitalization. Pt was given an observation letter. No questions or concerns were noted. Selene Felipe LCSW    Care Management Interventions  PCP Verified by CM:  Yes (Dr. Santa Stevens)  Discharge Durable Medical Equipment: No  Physical Therapy Consult: Yes  Occupational Therapy Consult: Yes  Speech Therapy Consult: No  Current Support Network: Lives with Spouse  Confirm Follow Up Transport: Family  Plan discussed with Pt/Family/Caregiver: Yes  Freedom of Choice Offered: Yes  Discharge Location  Discharge Placement: Home with home health

## 2018-01-12 NOTE — ED NOTES
Spoke to patient states she wants to go home and sign out AM. I told patient I would call family practice. Spoke to family preactice staes they will be down to talk to her. seperately family practice called and states he spoke to the patiwent a few moments before and wanted to look at her ankle wound, according to family practice patient became irrate and said he did not need to look at it.  Patient then asked asked for her IV to be removed and  that she wants to go home

## 2018-01-12 NOTE — PROGRESS NOTES
Occupational Therapy EVALUATION/discharge  Patient: Tyson Kelley (89 y.o. female)  Date: 1/12/2018  Primary Diagnosis: Pancreatitis        Precautions: fall       ASSESSMENT:   Based on the objective data described below, the patient presents with hospital admission for pancreatitis. Patient also with left lateral foot wound but unwilling to allow hospital staff to assess. Patient reports MD advised NWB however patient reports baseline balance is poor secondary to polio and CP. Patient reports she has been putting weight on her L foot and able to transfer with assistance of spouse. Patient educated regarding use of sliding board for transfers to eliminate weight to L foot and patient declines. She is unable to stand without bilateral feet on floor but unwilling to attempt alternate options. Patient agreeable to OOB, but only if therapist left the room to allow spouse to assist and ensure patient fully covered with gown. Patient with attempt to stand with spouse when allowing therapists to reenter room and education provided regarding donning socks/shoes for decreased risk of fall. Patient refusing both socks and shoes report she cannot wear a shoe and hates socks. Patient walking short distance in room, full weight bearing on foot. Patient reports spouse can assist at home and spouse confirms. Patient and spouse not interested in further OT services. Will sign off. Discharge Recommendations: None  Further Equipment Recommendations for Discharge: none       SUBJECTIVE:   Patient stated I have to put weight on it. I need both feet, my balance is already bad.     OBJECTIVE DATA SUMMARY:   HISTORY:   Past Medical History:   Diagnosis Date    Acid indigestion     or heartburn    Arthritis     scince childhood    Cerebral palsy (HCC)     [facility]     Diabetes ([facility]     Financial difficulties 10/4/2012    GERD (gastroesophageal reflux disease)     Hemorrhoids     Hernia of unspecified site of abdominal cavity without mention of obstruction or gangrene     Hypertension     Mitral regurgitation 10/5/2011    Muscle spasm 6/19/2014    chronic    PAD (peripheral artery disease) ([facility] 6/19/2014    Polio     Primary osteoarthritis of both hips 8/9/2012    Skin disease, bullous     frequent    Spastic paralysis (HCC)     Stiff joint     Stroke ([facility]     Tobacco abuse 8/10/2012    Urine incontinence     controlling    Weakness     of an arm or leg     Past Surgical History:   Procedure Laterality Date    HX APPENDECTOMY      pt states surgery at 27 y.o   [address]    hysterectomy    HX HEENT      toncillectomy childhood    HX ORTHOPAEDIC      Bone transplants, tendons stretched, legs turned    HX TRANSPLANT      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     MUSCLE-SKIN FLAP,LEG      VA BONE MARROW HARVEST TRANSPLANTATION ALLOGENEIC         Prior Level of Function/Environment/Context: assist with ADLs from spouse   Occupations in which the patient is/was successful, what are the barriers preventing that success:   Performance Patterns (routines, roles, habits, and rituals):   Personal Interests and/or values:   Expanded or extensive additional review of patient history:     Home Situation  Home Environment: Private residence  Wheelchair Ramp: Yes  One/Two Story Residence: One story  Living Alone: No  Support Systems: Spouse/Significant Other/Partner  Patient Expects to be Discharged to[de-identified] Private residence  Current DME Used/Available at Home: Commode, bedside, Shower chair, Walker, rolling, Wheelchair  Tub or Shower Type: Shower  [x]  Right hand dominant   []  Left hand dominant    EXAMINATION OF PERFORMANCE DEFICITS:  Cognitive/Behavioral Status:  Neurologic State: Alert  Orientation Level: Oriented X4  Cognition: Follows commands  Perception: Appears intact  Perseveration: No perseveration noted  Safety/Judgement: Awareness of environment    Skin: intact as seen    Edema: none noted Hearing: Auditory  Auditory Impairment: None    Vision/Perceptual:      Range of Motion:  AROM: Generally decreased, functional  PROM: Within functional limits                      Strength:  Strength: Generally decreased, functional                Coordination:  Coordination: Within functional limits  Fine Motor Skills-Upper: Left Intact; Right Intact    Gross Motor Skills-Upper: Left Intact; Right Intact    Tone & Sensation:  Tone: Normal  Sensation: Intact                      Balance:  Sitting: Intact  Standing: Impaired; With support  Standing - Static: Good  Standing - Dynamic : Good    Functional Mobility and Transfers for ADLs:  Bed Mobility:  Rolling: Contact guard assistance  Supine to Sit: Contact guard assistance  Sit to Supine: Contact guard assistance  Scooting: Contact guard assistance    Transfers:  Sit to Stand: Stand-by asssistance  Stand to Sit: Stand-by asssistance  Bed to Chair: Stand-by asssistance  Toilet Transfer : Contact guard assistance    ADL Assessment:  Feeding: Independent    Oral Facial Hygiene/Grooming: Supervision (seated )    Bathing: Minimum assistance    Upper Body Dressing: Minimum assistance    Lower Body Dressing: Moderate assistance    Toileting: Moderate assistance                ADL Intervention and task modifications:       Cognitive Retraining  Safety/Judgement: Awareness of environment    Therapeutic Exercise:     Functional Measure:    Barthel Index:    Bathin  Bladder: 5  Bowels: 10  Groomin  Dressin  Feeding: 10  Mobility: 5  Stairs: 0  Toilet Use: 5  Transfer (Bed to Chair and Back): 10  Total: 55       Barthel and G-code impairment scale:  Percentage of impairment CH  0% CI  1-19% CJ  20-39% CK  40-59% CL  60-79% CM  80-99% CN  100%   Barthel Score 0-100 100 99-80 79-60 59-40 20-39 1-19   0   Barthel Score 0-20 20 17-19 13-16 9-12 5-8 1-4 0      The Barthel ADL Index: Guidelines  1.  The index should be used as a record of what a patient does, not as a record of what a patient could do. 2. The main aim is to establish degree of independence from any help, physical or verbal, however minor and for whatever reason. 3. The need for supervision renders the patient not independent. 4. A patient's performance should be established using the best available evidence. Asking the patient, friends/relatives and nurses are the usual sources, but direct observation and common sense are also important. However direct testing is not needed. 5. Usually the patient's performance over the preceding 24-48 hours is important, but occasionally longer periods will be relevant. 6. Middle categories imply that the patient supplies over 50 per cent of the effort. 7. Use of aids to be independent is allowed. Aracelis Nichols., Barthel, D.W. (0162). Functional evaluation: the Barthel Index. [address] [facility] (14)2. Viviana Core jin DERIK Navarro, Carole Shafer., Oriana Cancino., [facility], [address] [place] [address] (1999). Measuring the change indisability after inpatient rehabilitation; comparison of the responsiveness of the Barthel Index and Functional [place] Measure. Journal of Neurology, Neurosurgery, and Psychiatry, 66(4), 985-134. Delmer Quiñones, N.J.A, JESSICA Cote.UMANG, & Ese Cohn, M.A. (2004.) Assessment of post-stroke quality of life in cost-effectiveness studies: The usefulness of the Barthel Index and the EuroQoL-5D. Quality of Life Research, 13, 447-70       G codes: In compliance with CMSs Claims Based Outcome Reporting, the following G-code set was chosen for this patient based on their primary functional limitation being treated: The outcome measure chosen to determine the severity of the functional limitation was the Barthel Index with a score of 55/100 which was correlated with the impairment scale. ?  Self Care:     - CURRENT STATUS: CK - 40%-59% impaired, limited or restricted    - GOAL STATUS: CK - 40%-59% impaired, limited or restricted    - D/C STATUS:  CK - 40%-59% impaired, limited or restricted     Occupational Therapy Evaluation Charge Determination   History Examination Decision-Making   LOW Complexity : Brief history review  LOW Complexity : 1-3 performance deficits relating to physical, cognitive , or psychosocial skils that result in activity limitations and / or participation restrictions  LOW Complexity : No comorbidities that affect functional and no verbal or physical assistance needed to complete eval tasks       Based on the above components, the patient evaluation is determined to be of the following complexity level: LOW   Pain:  Pain Scale 1: Numeric (0 - 10)  Pain Intensity 1: 10  Pain Location 1: Abdomen  Pain Orientation 1: Anterior  Pain Description 1: Aching  Pain Intervention(s) 1: Medication (see MAR)  Activity Tolerance:   VSS  Please refer to the flowsheet for vital signs taken during this treatment. After treatment:   []  Patient left in no apparent distress sitting up in chair  [x]  Patient left in no apparent distress in bed  [x]  Call bell left within reach  [x]  Nursing notified  [x]  Caregiver present  []  Bed alarm activated    COMMUNICATION/EDUCATION:   Communication/Collaboration:  [x]      Home safety education was provided and the patient/caregiver indicated understanding. [x]      Patient/family have participated as able and agree with findings and recommendations. []      Patient is unable to participate in plan of care at this time.   Findings and recommendations were discussed with: Physical Therapist and Registered Nurse    DEV Marino/L  Time Calculation: 16 mins

## 2018-01-12 NOTE — PROGRESS NOTES
TRANSFER - IN REPORT:    Verbal report received from Crow Conway RN(name) on Baldemar  being received from ER(unit) for routine progression of care      Report consisted of patients Situation, Background, Assessment and   Recommendations(SBAR). Information from the following report(s) SBAR, Kardex, Procedure Summary and Intake/Output was reviewed with the receiving nurse. Opportunity for questions and clarification was provided. Assessment completed upon patients arrival to unit and care assumed. Primary Nurse Lucila Wade RN and Crow Conway RN performed a dual skin assessment on this patient Impairment noted- see wound doc flow sheet  Balta score is 18    Upon assessment of patient, wound noted to Left lower leg that is covered with a dry dressing. Patient notified of dual eye skin assessment and adamantly refused to allow staff to assess wound. Family Practice residents aware. Patient also stating \"I will leave this hospital if anyone but my  touches my leg\".

## 2018-01-12 NOTE — DIABETES MGMT
DTC Consult Note     Patient was able to give return demonstration of []    glucometer, []    saline injection with []    no/ []    minimal assistance needed. [x]    Nurse to have patient self inject prior to discharge. Alycia/ Tyra Garcia was agreeable to suggestions, engaged during interview. Was given an opportunity to ask questions. Verbalized understanding of suggestions. Mr. Alonzo Vásquez () was also present at interview, engaged and asked appropriate questions and verbalized understanding. Please consider a mealtime insulin while inpatient, such as Humalog 2 units with meals (0.1 units/kg/day). Please consider discharging Mrs. Alonzo Vásquez on an increased dose of her home insulin at a minimum from -  from 10 units before meals to 12 units before meals. This is a 20% uptitration. Channing De León will require the following diabetes prescriptions prior to discharge (Please specify Reli-On brand.):    ReliOn Insulin Syringes - 31 G  (1/3 cc)  Humulin/ ReliOn 70/30    These medications can only be filled at [facility], to take advantage to the low cost.           Estimated Creatinine Clearance: 81.1 mL/min (based on Cr of 0.64). POC Glucose last 24hrs:   Lab Results   Component Value Date/Time     (H) 01/12/2018 12:38 AM     (H) 01/11/2018 04:09 PM    GLUCPOC 323 (H) 01/12/2018 11:57 AM    GLUCPOC 244 (H) 01/12/2018 07:34 AM    GLUCPOC 224 (H) 01/11/2018 11:14 PM        Inpatient medications for glucose management:  1. Correction Scale: Lispro (Humalog) Insulin Resistant Sensitivity scale to cover for glucose > 139 mg/dL before meals and for glucose >199 at bedtime      2.  Lantus 35 units     Consult received from Loc Schmitt MD  for  [x]    Assessment of home management  []    Meal planning  []    Meter / Monitoring  []    New Diagnosis  []    Insulin education  []    Insulin pump notification  []    Medication recommendations  []    Hospital glucose management  [] Gerald Guevara  []    Outpatient Education - Information forwarded to [employer] who will follow-up with pt 1 week after discharge    Chart reviewed and initial evaluation complete on [address] . Abhishek Underwood is a 58 yo  female with a past medical history relevant for  DM type 2, per Dr. Leola Connolly MD's H&P dated 2018. Mrs. Kyra Keen monitors blood glucose at home 2 (times) per day, and denies difficulty obtaining diabetes supplies. Patient reports glucose readings are: in the 500's recently. Currently, pt eats 3 meals a day. She states she used to be on Toujeo, as Dr. Hyatt May used to provide samples for her. She now takes Novolog Mix 70/30, as these are the samples Dr. Hyatt May provides. Med Rec has been updated accordingly. Outpatient medications for glucose management per patient and spouse/SO   1.  Novolog Mix 70/30 - 10 units before meals        Lab Results   Component Value Date/Time    Hemoglobin A1c 11.6 2018 06:48 PM    Hemoglobin A1c 10.5 10/03/2017 01:49 PM         Assessed and provided patient verbal and/or written information on the following  · Diabetes: disease process   · Blood sugar goals   · Causes of high / low blood glucose and treatment  · Lab results: A1C - target   · Blood sugar monitoring  · Site rotation  · Use of insulin pen  · Self-injection of insulin   · Use of sliding scale / correction formula  · Use of insulin to carbohydrate ratio  · Sharps disposal  · Sick day guidelines  · Meal planning:   · Activity guidelines   · Foot care  · Chronic complications and Standards of Care  · Delayed healing      Encouraged the following:   · increased exercise  · dietary modifications   -Eat 3 meals a day   -Eat a protein at each meal    · regular blood sugar monitorin times daily      Provided patient with the following: [x]    Diabetes Self-Care Guide                [x]    Outpatient DTC contact number               []    Glucometer                [] Insulin Education Guide               []    Carbohydrate Counting Guide               [x]    Sample meal plan                 []    Chair exercises guide               []    [facility] Reli-On Product Catalog            Thank you. Beth Rachel.  Kelly Balbuena, MPH, BSN, [address]

## 2018-01-12 NOTE — H&P
[address] [facility] [address] [place] [address]   Office [phone]  Fax [phone]       Admission H&P     Name: Mora Lieberman MRN: [id number]  Sex: Female   YOB: 1960  Age: 59 y.o. PCP: Tee Reza MD     Source of Information: patient, medical records    Chief complaint: Abdominal pain, nausea and vomiting    History of Present Illness  Mora Lieberman is a 59 y.o. female with known cerebral palsy, Diabetes requiring insulin, GERD, Crohn's, Hx of  polio who presents to the ER complaining of abdominal pain and nausea x 1 week. She notes that her last BM was yesterday. She describes the pain as diffuse in her abdomen, and states she hasn't been \"able to eat in 2 weeks\". She noted that she went to her PCP yesterday for these symptoms and labs were done, showing an elevated lipase. She was told that she had pancreatitis. Patient notes that she had her gallbladder removed a long time ago and denies any alcohol use recently. Patient denies any burning with urination. She also notes that her left heel wound has been treated by her PCP, and her  changes dressings daily, she denies any redness, swelling or discharge from wound. In the ER, vital signs were remarkable for tachycardia of 105 bpm. Labs were remarkable for glucose of 491, 502 yesterday in PCP. Anion gap is 13. Lipase in PCP's office was 801, down to 386 in ED. Patient had leukocytosis of 14.4 on admission. Home Medications   Prior to Admission medications    Medication Sig Start Date End Date Taking? Authorizing Provider   ondansetron (ZOFRAN ODT) 4 mg disintegrating tablet Take 1 Tab by mouth every eight (8) hours as needed for Nausea. 1/10/18   Carmita Lyons MD   oxyCODONE-acetaminophen (PERCOCET) 5-325 mg per tablet Take 1 Tab by mouth every four (4) hours as needed for Pain. Max Daily Amount: 6 Tabs.  1/4/18   Carmita Lyons MD   insulin glargine (LANTUS,BASAGLAR) 100 unit/mL (3 mL) inpn 35 Units by SubCUTAneous route daily. 11/7/17   Charol Duane Risser, MD   diphenhydrAMINE (BENADRYL) 25 mg tablet Take 50 mg by mouth nightly as needed for Itching. 11/10/17   Charol Duane Risser, MD   fluconazole (DIFLUCAN) 150 mg tablet Take 1 Tab by mouth daily. Repeat in 3 days if needed. Patient taking differently: Take 1 Tab by mouth daily. Repeat in 3 days if needed. Indications: itching 11/9/17   Janee Lyons MD   lisinopril (PRINIVIL, ZESTRIL) 20 mg tablet TAKE ONE TABLET BY MOUTH ONCE DAILY 11/8/17   Charol Duane Risser, MD   pravastatin (PRAVACHOL) 40 mg tablet Take 1 Tab by mouth daily. 11/7/17   Charol Duane Risser, MD   pioglitazone (ACTOS) 30 mg tablet Take 1 Tab by mouth daily. 11/7/17   Charol Duane Risser, MD   ibuprofen (MOTRIN) 800 mg tablet Take 1 Tab by mouth every eight (8) hours as needed for Pain. 11/7/17   Charol Duane Risser, MD   rOPINIRole (REQUIP) 0.5 mg tablet Take 1 Tab by mouth nightly. 11/7/17   Charol Duane Risser, MD   metFORMIN (GLUCOPHAGE) 1,000 mg tablet Take 1 Tab by mouth two (2) times daily (with meals). 11/7/17   Charol Duane Risser, MD   docusate sodium (COLACE) 100 mg capsule Take 100 mg by mouth two (2) times daily as needed for Constipation. Historical Provider   oxybutynin (DITROPAN) 5 mg tablet TAKE ONE TABLET BY MOUTH 4 TIMES DAILY 10/3/17   Charol Duane Risser, MD   fish oil-omega-3 fatty acids 340-1,000 mg capsule Take 1 Cap by mouth daily. Historical Provider   cholecalciferol (VITAMIN D3) 1,000 unit tablet Take 1,000 Units by mouth daily. Historical Provider   aspirin delayed-release (ASPIR-81) 81 mg tablet Take 1 Tab by mouth daily. 7/28/14   Anne Moran MD       Allergies  Allergies   Allergen Reactions    Potassium Other (comments)     Patient states she will refuse IV Potassium due to irritation-Patient will accept PO form.     Anaprox [Naproxen Sodium] Other (comments)     Blood pressure drops  Tolerates ibuprofen    Codeine Nausea and Vomiting     Tolerates oxycodone/acetaminophen    Mobic [Meloxicam] Other (comments)     Blood pressure drops  Tolerates ibuprofen    Tylenol [Acetaminophen] Other (comments)     \"Stomach hurts\", hx of Crohn's disease        Past Medical History:   Diagnosis Date    Acid indigestion     or heartburn    Arthritis     scince childhood    Cerebral palsy ([address]     Crohn disease ([address]     Diabetes ([address]     Financial difficulties 10/4/2012    GERD (gastroesophageal reflux disease)     Hemorrhoids     Hernia of unspecified site of abdominal cavity without mention of obstruction or gangrene     Hypertension     Mitral regurgitation 10/5/2011    Muscle spasm 6/19/2014    chronic    PAD (peripheral artery disease) ([address] 6/19/2014    Polio     Primary osteoarthritis of both hips 8/9/2012    Skin disease, bullous     frequent    Spastic paralysis ([address]     Stiff joint     Stroke ([address]     Tobacco abuse 8/10/2012    Urine incontinence     controlling    Weakness     of an arm or leg       Previous Hospitalization(s)    10/31/17- Pt admitted for non healing diabetic foot ulcer with LLE cellulitis, treated with IV Vanc and ceftriaxone, switched to Cipro.  Wound Cx grew pseudomonas    6/28/16- Pt admitted for cellulitis 2/2 to chronic foot ulcer      Past Surgical History:   Procedure Laterality Date    HX APPENDECTOMY      pt states surgery at 27 y.o    HX GYN  1995    hysterectomy    HX HEENT      toncillectomy childhood    HX ORTHOPAEDIC      Bone transplants, tendons stretched, legs turned    HX TRANSPLANT      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     MUSCLE-SKIN FLAP,LEG      AR BONE MARROW HARVEST TRANSPLANTATION ALLOGENEIC         Family History   Problem Relation Age of Onset    Diabetes Mother     Alcohol abuse Mother     Heart Disease Mother     Hypertension Mother     Cancer Father     Alcohol abuse Father     Diabetes Maternal Grandmother     Stroke Maternal Grandmother        Social History  Social History     Social History    Marital status:      Spouse name: N/A    Number of children: N/A    Years of education: N/A     Occupational History    Not on file. Social History Main Topics    Smoking status: Current Every Day Smoker     Packs/day: 1.50     Years: 38.00    Smokeless tobacco: Former User    Alcohol use No    Drug use: No    Sexual activity: Yes     Partners: Male     Other Topics Concern    Not on file     Social History Narrative       Alcohol history: Not at all  Smoking history: Smokes 1 ppd x 40 years  Illicit drug history: Not at all    Living arrangement: patient lives with their spouse. Ambulates: Assisted walker    Review of Systems  Review of Systems   10 point ROS reviewed and discussed with patient and negative unless otherwise specified in HPI    Physical Exam  Objective:  General Appearance:  Comfortable and well-appearing. Vital signs: (most recent): Blood pressure 106/69, pulse (!) 105, temperature 98.2 °F (36.8 °C), resp. rate 17, height 4' 9\" (1.448 m), weight 61.2 kg (135 lb), SpO2 100 %. HEENT: Normal HEENT exam.    Lungs:  Normal respiratory rate and normal effort. Breath sounds clear to auscultation. No wheezes, rales or rhonchi. Heart: Normal rate. Regular rhythm. No murmur, gallop or friction rub. Extremities: (Patient refused to allow for examination of lower extremities to assess foe edema, erythema, or to check on reported left foot ulcer)  Neurological: Patient is alert and oriented to person, place and time. Skin:  Warm and dry. No rash. Abdomen: Abdomen is soft. (Hernia noted mid abdomen, with scarring from surgeries)Hypoactive bowel sounds. There is no abdominal tenderness.          O2 Device: Room air     Laboratory Data  Recent Results (from the past 8 hour(s))   URINALYSIS W/ RFLX MICROSCOPIC    Collection Time: 01/11/18  3:02 PM   Result Value Ref Range    Color YELLOW/STRAW      Appearance CLOUDY (A) CLEAR      Specific gravity 1.015 1.003 - 1.030      pH (UA) 5.5 5.0 - 8.0      Protein 30 (A) NEG mg/dL    Glucose >1000 (A) NEG mg/dL    Ketone NEGATIVE  NEG mg/dL    Bilirubin NEGATIVE  NEG      Blood TRACE (A) NEG      Urobilinogen 1.0 0.2 - 1.0 EU/dL    Nitrites POSITIVE (A) NEG      Leukocyte Esterase NEGATIVE  NEG      WBC 20-50 0 - 4 /hpf    RBC 0-5 0 - 5 /hpf    Epithelial cells MODERATE (A) FEW /lpf    Bacteria 2+ (A) NEG /hpf   CBC WITH AUTOMATED DIFF    Collection Time: 01/11/18  4:09 PM   Result Value Ref Range    WBC 14.4 (H) 3.6 - 11.0 K/uL    RBC 4.60 3.80 - 5.20 M/uL    HGB 13.1 11.5 - 16.0 g/dL    HCT 39.0 35.0 - 47.0 %    MCV 84.8 80.0 - 99.0 FL    MCH 28.5 26.0 - 34.0 PG    MCHC 33.6 30.0 - 36.5 g/dL    RDW 13.0 11.5 - 14.5 %    PLATELET 151 636 - 060 K/uL    NEUTROPHILS 77 (H) 32 - 75 %    LYMPHOCYTES 17 12 - 49 %    MONOCYTES 3 (L) 5 - 13 %    EOSINOPHILS 3 0 - 7 %    BASOPHILS 0 0 - 1 %    ABS. NEUTROPHILS 11.1 (H) 1.8 - 8.0 K/UL    ABS. LYMPHOCYTES 2.5 0.8 - 3.5 K/UL    ABS. MONOCYTES 0.4 0.0 - 1.0 K/UL    ABS. EOSINOPHILS 0.4 0.0 - 0.4 K/UL    ABS. BASOPHILS 0.0 0.0 - 0.1 K/UL   METABOLIC PANEL, COMPREHENSIVE    Collection Time: 01/11/18  4:09 PM   Result Value Ref Range    Sodium 130 (L) 136 - 145 mmol/L    Potassium 3.4 (L) 3.5 - 5.1 mmol/L    Chloride 95 (L) 97 - 108 mmol/L    CO2 22 21 - 32 mmol/L    Anion gap 13 5 - 15 mmol/L    Glucose 491 (H) 65 - 100 mg/dL    BUN 18 6 - 20 MG/DL    Creatinine 0.78 0.55 - 1.02 MG/DL    BUN/Creatinine ratio 23 (H) 12 - 20      GFR est AA >60 >60 ml/min/1.73m2    GFR est non-AA >60 >60 ml/min/1.73m2    Calcium 9.3 8.5 - 10.1 MG/DL    Bilirubin, total 0.3 0.2 - 1.0 MG/DL    ALT (SGPT) 33 12 - 78 U/L    AST (SGOT) 15 15 - 37 U/L    Alk.  phosphatase 135 (H) 45 - 117 U/L    Protein, total 7.2 6.4 - 8.2 g/dL    Albumin 3.1 (L) 3.5 - 5.0 g/dL    Globulin 4.1 (H) 2.0 - 4.0 g/dL    A-G Ratio 0.8 (L) 1.1 - 2. 2     LIPASE    Collection Time: 01/11/18  4:09 PM   Result Value Ref Range    Lipase 386 73 - 393 U/L       Imaging  CXR Results  (Last 48 hours)    None        CT Results  (Last 48 hours)    None              Assessment and Plan     Alisha Clark is a 59 y.o. female who is admitted for acute pancreatitis in the setting of uncontrolled diabetes and UTI      Pancreatitis  Patient has no previous Hx of pancreatitis, and had past cholecystectomy. Abd. Pain and N/V x 1 week, lipase at PCP office was 801, down to 386  -Pt made NPO, can have minimal ice chips  -Continue LR @ 250 mL/hr  -Continue Dilaudid 0.2mg q4h IV for pain control  -Continue Zofran 4mg IV q4h  -UDS pending    UTI  Pt. Asymptomatic, UA in ED was + for UTI (+ nitrites, 20-50 WBCs)  -Will treat with 1g Rocephin x 1    Diabetic Foot Ulcer  Outpatient wound care by PCP and Home Health per patient, currently wound is dressed, patient repeatedly refused to allow for examination of wound and LLE to assess for infection, in setting of 2/4 SIRS criteria (, WBC 14.4). Has had multiple past hospitalizations for left foot wound infections and associated LLE cellulitis. -Meets SIRS criteria, LA ordered, blood cultures pending  -Wound care consulted  -Pt refusing exam of LLE and wound, will consider treatment with empiric Vanc and Zosyn if patient does not leave AMA    Hypertension  - BP on admission 106/69   - Patient NPO, holding home BP meds (Lisinopril)  - Will continue to monitor at this time and readjust as BP's trend. Diabetes Mellitus T2  Per chart review, patient has previously uncontrolled diabetes, on Lantus daily. Blood glucose elvated at 502 yesterday in PCP office, continues to be elevated in ED at 491  - Last HgA1c 10.5 on 10/3/17  - Discontinued home medications of Metformin and Actos.   - Given 1 time dose 10U Lispro, continue home Lantus 35U units at bedtime, given uncontrolled nature of diabetes  - Insulin Sliding Scale normal sensitivity with AC&HS glucose checks. - Hypoglycemia protocols ordered. Cerebral Palsy  -Holding home Requip  -Pt uses walker to ambulate    FEN/GI - NPO. LR at 250 mL/hr. Activity - Out of bed with assistance  DVT prophylaxis - Lovenox  GI prophylaxis - Not indicated at this time  Fall prophylaxis - Not indicated at this time. Disposition - Admit to Medical. Plan to d/c to Home. Consulting PT/OT/CM  Code Status - DNR, discussed with patient / caregivers.   Next of Len[facility] [address] Name and [place] ()- Will be staying with patient for entire stay    Patient Wiley Badder will be discussed Dr. Álvaro Donnelly.    7:20 PM, 01/11/18  Karyn Barnes MD  Family Medicine Resident       For Billing    Chief Complaint   Patient presents with   [facility] Abdominal Pain       Hospital Problems  Date Reviewed: 1/10/2018          Codes Class Noted POA    Pancreatitis ICD-10-CM: K85.90  ICD-9-CM: 874.7  1/11/2018 Unknown

## 2018-01-12 NOTE — PROGRESS NOTES
physical Therapy EVALUATION/DISCHARGE  Patient: Crissy Doll (19 y.o. female)  Date: 1/12/2018  Primary Diagnosis: Pancreatitis        Precautions: fall and NWB on the left LE for wound healing     ASSESSMENT :  Based on the objective data described below, the patient presents with generalized weakness and some left lateral wound on the foot. which MD adviced to not to put any weight on it for wound healing. Patient adamant that she cannot do that because of her polio, CP and many more. patient declined Physical Therapy service, explained to patient and spouse that we need to document her base line level before discharge, then patient finally agreed, however patient told Physical Therapist to step out of the room when she sit on the edge of bed, told them I need to see that they are safe to sit on the edge of bed. Patient still insist. Step out of the room with the door slightly open to see how spouse assist patient up on the edge of bed. Sit to stand with spouse using a rolling walker. Reminded patient not to put any weight on the left foot. Patient insist she need to put weight on it and refuse to wear any hospital socks or shoes. Offered to be OOB to chair as tolerated patient declined. Assisted supine on bed. Patient and spouse stated they do not need any Physical Therapy while in the hospital spouse has been assisting her for over 30 years and they ok with it. Offered sliding board training for transfer patient declined. patient at her base line level of function spouse assisting with all transfers and activity, confirmed multiple times that they do not want skilled Physical Therapy. Notified nurse and agreed to monitor patient. Skilled physical therapy is not indicated at this time. PLAN :  Discharge Recommendations: None  Further Equipment Recommendations for Discharge: already has DME's     SUBJECTIVE:   Patient stated I'm fine I do not need any Physical Therapy.     OBJECTIVE DATA SUMMARY:   HISTORY: Past Medical History:   Diagnosis Date    Acid indigestion     or heartburn    Arthritis     scince childhood    Cerebral palsy (HCC)     [facility]     Diabetes ([facility]     Financial difficulties 10/4/2012    GERD (gastroesophageal reflux disease)     Hemorrhoids     Hernia of unspecified site of abdominal cavity without mention of obstruction or gangrene     Hypertension     Mitral regurgitation 10/5/2011    Muscle spasm 6/19/2014    chronic    PAD (peripheral artery disease) ([facility] 6/19/2014    Polio     Primary osteoarthritis of both hips 8/9/2012    Skin disease, bullous     frequent    Spastic paralysis (HCC)     Stiff joint     Stroke ([facility]     Tobacco abuse 8/10/2012    Urine incontinence     controlling    Weakness     of an arm or leg     Past Surgical History:   Procedure Laterality Date    HX APPENDECTOMY      pt states surgery at 27 y.o    HX GYN  1995    hysterectomy    HX HEENT      toncillectomy childhood    HX ORTHOPAEDIC      Bone transplants, tendons stretched, legs turned    HX TRANSPLANT      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     MUSCLE-SKIN FLAP,LEG      DC BONE MARROW HARVEST TRANSPLANTATION ALLOGENEIC       Prior Level of Function/Home Situation: modified independent with transfer to and from the wheelchair, ambualte with rollator inside the house has ramp to enter.   Personal factors and/or comorbidities impacting plan of care:     Home Situation  Home Environment: Private residence  Wheelchair Ramp: Yes  One/Two Story Residence: One story  Living Alone: No  Support Systems: Spouse/Significant Other/Partner  Patient Expects to be Discharged to[de-identified] Private residence  Current DME Used/Available at Home: Commode, bedside, Shower chair, Walker, rolling, Wheelchair  Tub or Shower Type: Shower    EXAMINATION/PRESENTATION/DECISION MAKING:   Critical Behavior:  Neurologic State: Alert  Orientation Level: Oriented X4  Cognition: Follows commands Hearing: Auditory  Auditory Impairment: None    Range Of Motion:  AROM: Generally decreased, functional           PROM: Within functional limits           Strength:    Strength: Generally decreased, functional                    Tone & Sensation:                                  Coordination:  Coordination: Within functional limits  Vision:      Functional Mobility:  Bed Mobility:  Rolling: Contact guard assistance  Supine to Sit: Contact guard assistance  Sit to Supine: Contact guard assistance  Scooting: Contact guard assistance  Transfers:  Sit to Stand: Stand-by asssistance  Stand to Sit: Stand-by asssistance  Stand Pivot Transfers: Stand-by asssistance     Bed to Chair: Stand-by asssistance              Balance:   Sitting: Intact  Standing: Impaired; With support  Standing - Static: Good  Standing - Dynamic : Good  Ambulation/Gait Training:  Distance (ft): 15 Feet (ft)  Assistive Device: Walker, rolling;Gait belt  Ambulation - Level of Assistance: Modified independent     Gait Description (WDL): Within defined limits  Gait Abnormalities: Path deviations; Step to gait     Left Side Weight Bearing: Non-weight bearing  Base of Support: Widened     Speed/Carmen: Slow                           Therapeutic Exercises:    Instructed patient to continue active range of motion exercise on both legs while up on chair or on bed.        Functional Measure:  Tinetti test:    Sitting Balance: 1  Arises: 1  Attempts to Rise: 2  Immediate Standing Balance: 1  Standing Balance: 1  Nudged: 2  Eyes Closed: 1  Turn 360 Degrees - Continuous/Discontinuous: 1  Turn 360 Degrees - Steady/Unsteady: 1  Sitting Down: 2  Balance Score: 13  Indication of Gait: 1  R Step Length/Height: 0  L Step Length/Height: 0  R Foot Clearance: 1  L Foot Clearance: 1  Step Symmetry: 0  Step Continuity: 0  Path: 1  Trunk: 1  Walking Time: 0  Gait Score: 5  Total Score: 18       Tinetti Test and G-code impairment scale:  Percentage of Impairment CH    0% CI    1-19% CJ    20-39% CK    40-59% CL    60-79% CM    80-99% CN     100%   Tinetti  Score 0-28 28 23-27 17-22 12-16 6-11 1-5 0       Tinetti Tool Score Risk of Falls  <19 = High Fall Risk  19-24 = Moderate Fall Risk  25-28 = Low Fall Risk  Tinetti ME. Performance-Oriented Assessment of Mobility Problems in Elderly Patients. [address]; [id number]. (Scoring Description: PT Bulletin Feb. 10, 1993)    Older adults: Juarez Garcia et al, 2009; n = 1000 [facility] elderly evaluated with ABC, ALIDA, ADL, and IADL)  · Mean ALIDA score for males aged 69-68 years = 26.21(3.40)  · Mean ALIDA score for females age 69-68 years = 25.16(4.30)  · Mean ALIDA score for males over 80 years = 23.29(6.02)  · Mean ALIDA score for females over 80 years = 17.20(8.32)       G codes: In compliance with CMSs Claims Based Outcome Reporting, the following G-code set was chosen for this patient based on their primary functional limitation being treated: The outcome measure chosen to determine the severity of the functional limitation was the tinetti with a score of 18/28 which was correlated with the impairment scale.     ? Mobility - Walking and Moving Around:     - CURRENT STATUS: CJ - 20%-39% impaired, limited or restricted    - GOAL STATUS: CJ - 20%-39% impaired, limited or restricted    - D/C STATUS:  CJ - 20%-39% impaired, limited or restricted        Physical Therapy Evaluation Charge Determination   History Examination Presentation Decision-Making   MEDIUM  Complexity : 1-2 comorbidities / personal factors will impact the outcome/ POC  MEDIUM Complexity : 3 Standardized tests and measures addressing body structure, function, activity limitation and / or participation in recreation  MEDIUM Complexity : Evolving with changing characteristics  Other outcome measures tinetti  MEDIUM      Based on the above components, the patient evaluation is determined to be of the following complexity level: MEDIUM    Pain:  Pain Scale 1: Numeric (0 - 10)  Pain Intensity 1: 10  Pain Location 1: Abdomen  Activity Tolerance:   Good. Please refer to the flowsheet for vital signs taken during this treatment. After treatment:   []   Patient left in no apparent distress sitting up in chair  [x]   Patient left in no apparent distress in bed  [x]   Call bell left within reach  [x]   Nursing notified  [x]   Caregiver present  []   Bed alarm activated    COMMUNICATION/EDUCATION:   Communication/Collaboration:  [x]   Fall prevention education was provided and the patient/caregiver indicated understanding. [x]   Patient/family have participated as able and agree with findings and recommendations. []   Patient is unable to participate in plan of care at this time. Findings and recommendations were discussed with: Occupational Therapist, Registered Nurse and patient    Thank you for this referral.  Isaac Hinson, PT,WCC.    Time Calculation: 25 mins

## 2018-01-12 NOTE — WOUND CARE
Wound care  MD order to assess the left heel wound present on admission. Patient and spouse state they have [facility] for wound care and follow Dr Arnel Morrison for wound care at clinic. Patient called the [facility] nurse for name of product used, states order is for hydrogel and dry dressing, spouse does care at home. Patient prefers to have spouse provide care and refused my assessment. Advised attending  of current care and patient refusal for nursing care  Orders written for spouse to perform care, supplies gathered and placed at bedside.     Nadeen Kramer RN [facility].

## 2018-01-12 NOTE — PROGRESS NOTES
Author of note went to re-examine patient based on patient history of left foot wound treated as an outpatient. Discussed that in setting of tachycardia and elevated WBC count, there was a possibility of infection. Despite patient being asymptomatic for UTI, discussed that her UA indicated an infection that would need to be treated. Also stated that her chronic wound would need to be examined to assess for infection. At that time, patient repeatedly refused to allow for examination of her left lower extremity, as she stated it was not the reason she was here. Reiterated that it was important to assess so the proper antibiotics could be administered. Patient then repeatedly said that she wanted to leave, discussed that it would not be prudent to leave with her medical issues.   She has not yet signed AMA paperwork and the night team will discuss risks of leaving AMA with patient

## 2018-01-13 LAB
ALBUMIN SERPL-MCNC: 2.6 G/DL (ref 3.5–5)
ALBUMIN/GLOB SERPL: 0.8 {RATIO} (ref 1.1–2.2)
ALP SERPL-CCNC: 119 U/L (ref 45–117)
ALT SERPL-CCNC: 40 U/L (ref 12–78)
ANION GAP SERPL CALC-SCNC: 11 MMOL/L (ref 5–15)
AST SERPL-CCNC: 35 U/L (ref 15–37)
BACTERIA SPEC CULT: ABNORMAL
BILIRUB SERPL-MCNC: 0.2 MG/DL (ref 0.2–1)
BUN SERPL-MCNC: 6 MG/DL (ref 6–20)
BUN/CREAT SERPL: 12 (ref 12–20)
CALCIUM SERPL-MCNC: 8.5 MG/DL (ref 8.5–10.1)
CC UR VC: ABNORMAL
CHLORIDE SERPL-SCNC: 105 MMOL/L (ref 97–108)
CO2 SERPL-SCNC: 25 MMOL/L (ref 21–32)
CREAT SERPL-MCNC: 0.51 MG/DL (ref 0.55–1.02)
ERYTHROCYTE [DISTWIDTH] IN BLOOD BY AUTOMATED COUNT: 12.9 % (ref 11.5–14.5)
GLOBULIN SER CALC-MCNC: 3.4 G/DL (ref 2–4)
GLUCOSE BLD STRIP.AUTO-MCNC: 148 MG/DL (ref 65–100)
GLUCOSE BLD STRIP.AUTO-MCNC: 188 MG/DL (ref 65–100)
GLUCOSE BLD STRIP.AUTO-MCNC: 231 MG/DL (ref 65–100)
GLUCOSE BLD STRIP.AUTO-MCNC: 256 MG/DL (ref 65–100)
GLUCOSE SERPL-MCNC: 144 MG/DL (ref 65–100)
HCT VFR BLD AUTO: 34.6 % (ref 35–47)
HGB BLD-MCNC: 11.3 G/DL (ref 11.5–16)
MCH RBC QN AUTO: 27.5 PG (ref 26–34)
MCHC RBC AUTO-ENTMCNC: 32.7 G/DL (ref 30–36.5)
MCV RBC AUTO: 84.2 FL (ref 80–99)
PLATELET # BLD AUTO: 331 K/UL (ref 150–400)
POTASSIUM SERPL-SCNC: 3.2 MMOL/L (ref 3.5–5.1)
PROT SERPL-MCNC: 6 G/DL (ref 6.4–8.2)
RBC # BLD AUTO: 4.11 M/UL (ref 3.8–5.2)
SERVICE CMNT-IMP: ABNORMAL
SODIUM SERPL-SCNC: 141 MMOL/L (ref 136–145)
WBC # BLD AUTO: 10.3 K/UL (ref 3.6–11)

## 2018-01-13 PROCEDURE — 80053 COMPREHEN METABOLIC PANEL: CPT | Performed by: FAMILY MEDICINE

## 2018-01-13 PROCEDURE — 65270000029 HC RM PRIVATE

## 2018-01-13 PROCEDURE — 74011250636 HC RX REV CODE- 250/636: Performed by: FAMILY MEDICINE

## 2018-01-13 PROCEDURE — 74011250636 HC RX REV CODE- 250/636: Performed by: STUDENT IN AN ORGANIZED HEALTH CARE EDUCATION/TRAINING PROGRAM

## 2018-01-13 PROCEDURE — 74011636637 HC RX REV CODE- 636/637: Performed by: FAMILY MEDICINE

## 2018-01-13 PROCEDURE — 82962 GLUCOSE BLOOD TEST: CPT

## 2018-01-13 PROCEDURE — 99218 HC RM OBSERVATION: CPT

## 2018-01-13 PROCEDURE — 85027 COMPLETE CBC AUTOMATED: CPT | Performed by: FAMILY MEDICINE

## 2018-01-13 PROCEDURE — 77030038269 HC DRN EXT URIN PURWCK BARD -A

## 2018-01-13 PROCEDURE — 36415 COLL VENOUS BLD VENIPUNCTURE: CPT | Performed by: FAMILY MEDICINE

## 2018-01-13 PROCEDURE — 74011000258 HC RX REV CODE- 258: Performed by: FAMILY MEDICINE

## 2018-01-13 PROCEDURE — 74011000258 HC RX REV CODE- 258: Performed by: STUDENT IN AN ORGANIZED HEALTH CARE EDUCATION/TRAINING PROGRAM

## 2018-01-13 PROCEDURE — 74011250637 HC RX REV CODE- 250/637: Performed by: STUDENT IN AN ORGANIZED HEALTH CARE EDUCATION/TRAINING PROGRAM

## 2018-01-13 RX ORDER — OXYCODONE HYDROCHLORIDE 5 MG/1
10 TABLET ORAL
Status: DISCONTINUED | OUTPATIENT
Start: 2018-01-13 | End: 2018-01-16 | Stop reason: HOSPADM

## 2018-01-13 RX ORDER — CEPHALEXIN 250 MG/1
500 CAPSULE ORAL EVERY 12 HOURS
Status: DISCONTINUED | OUTPATIENT
Start: 2018-01-14 | End: 2018-01-16 | Stop reason: HOSPADM

## 2018-01-13 RX ORDER — MORPHINE SULFATE 2 MG/ML
1 INJECTION, SOLUTION INTRAMUSCULAR; INTRAVENOUS
Status: DISCONTINUED | OUTPATIENT
Start: 2018-01-13 | End: 2018-01-16 | Stop reason: HOSPADM

## 2018-01-13 RX ORDER — ONDANSETRON 4 MG/1
4 TABLET, ORALLY DISINTEGRATING ORAL
Status: DISCONTINUED | OUTPATIENT
Start: 2018-01-13 | End: 2018-01-16 | Stop reason: HOSPADM

## 2018-01-13 RX ADMIN — INSULIN GLARGINE 35 UNITS: 100 INJECTION, SOLUTION SUBCUTANEOUS at 09:26

## 2018-01-13 RX ADMIN — Medication 10 ML: at 21:18

## 2018-01-13 RX ADMIN — HYDROMORPHONE HYDROCHLORIDE 1 MG: 2 INJECTION INTRAMUSCULAR; INTRAVENOUS; SUBCUTANEOUS at 06:10

## 2018-01-13 RX ADMIN — CEFEPIME HYDROCHLORIDE 2 G: 2 INJECTION, POWDER, FOR SOLUTION INTRAVENOUS at 16:49

## 2018-01-13 RX ADMIN — HYDROMORPHONE HYDROCHLORIDE 1 MG: 2 INJECTION INTRAMUSCULAR; INTRAVENOUS; SUBCUTANEOUS at 13:06

## 2018-01-13 RX ADMIN — INSULIN LISPRO 4 UNITS: 100 INJECTION, SOLUTION INTRAVENOUS; SUBCUTANEOUS at 17:41

## 2018-01-13 RX ADMIN — SODIUM CHLORIDE, SODIUM LACTATE, POTASSIUM CHLORIDE, AND CALCIUM CHLORIDE 150 ML/HR: 600; 310; 30; 20 INJECTION, SOLUTION INTRAVENOUS at 03:30

## 2018-01-13 RX ADMIN — HYDROMORPHONE HYDROCHLORIDE 1 MG: 2 INJECTION INTRAMUSCULAR; INTRAVENOUS; SUBCUTANEOUS at 01:44

## 2018-01-13 RX ADMIN — SODIUM CHLORIDE, SODIUM LACTATE, POTASSIUM CHLORIDE, AND CALCIUM CHLORIDE 75 ML/HR: 600; 310; 30; 20 INJECTION, SOLUTION INTRAVENOUS at 21:17

## 2018-01-13 RX ADMIN — ENOXAPARIN SODIUM 40 MG: 40 INJECTION SUBCUTANEOUS at 21:18

## 2018-01-13 RX ADMIN — SODIUM CHLORIDE, SODIUM LACTATE, POTASSIUM CHLORIDE, AND CALCIUM CHLORIDE 150 ML/HR: 600; 310; 30; 20 INJECTION, SOLUTION INTRAVENOUS at 11:16

## 2018-01-13 RX ADMIN — INSULIN LISPRO 7 UNITS: 100 INJECTION, SOLUTION INTRAVENOUS; SUBCUTANEOUS at 14:07

## 2018-01-13 RX ADMIN — Medication 10 ML: at 06:10

## 2018-01-13 RX ADMIN — OXYCODONE HYDROCHLORIDE 10 MG: 5 TABLET ORAL at 18:46

## 2018-01-13 RX ADMIN — Medication 10 ML: at 13:06

## 2018-01-13 RX ADMIN — INSULIN LISPRO 2 UNITS: 100 INJECTION, SOLUTION INTRAVENOUS; SUBCUTANEOUS at 09:26

## 2018-01-13 RX ADMIN — CEFEPIME HYDROCHLORIDE 2 G: 2 INJECTION, POWDER, FOR SOLUTION INTRAVENOUS at 03:34

## 2018-01-13 RX ADMIN — MORPHINE SULFATE 1 MG: 2 INJECTION, SOLUTION INTRAMUSCULAR; INTRAVENOUS at 19:33

## 2018-01-13 NOTE — PROGRESS NOTES
Bedside and Verbal shift change report given to Jerad (oncoming nurse) by Ivette Lundy (offgoing nurse). Report included the following information SBAR, Intake/Output, MAR and Recent Results.

## 2018-01-13 NOTE — PROGRESS NOTES
[facility]   Daily Progress Note    Date: 1/13/2018    Assessment/Plan:     Kenton Turner is a 59 y.o. female with known cerebral palsy, Diabetes requiring insulin, GERD, Crohn's and Hx of  polio who is admitted for acute pancreatitis.     Overnight Events: No acute events overnight. Acute Pancreatitis  - No previous Hx of pancreatitis, and had past cholecystectomy. - Lipase  801, down to 386  - Tolerated full  liquid diet this am. Will advance to GI Lite diet. - Continue LR @ 75 mL/hr  - Oxicodone 10mg PO with Morphine 1mg PRN for breakthrough pain. - Continue Zofran 4mg PO q6h  - Protonix  - UDS with opiates     Uncomplicated UTI  - Pt. Asymptomatic, UA with 2+ Bacteria and Nitrites  - Urine cultures with Pan sensitive Klebsiela. - Cefepime for 1 more day and discontinue tomorrow.     Diabetic Foot Ulcer  Outpatient wound care by PCP and Home Health per patient, currently wound is dressed, patient repeatedly refused to allow for examination of wound and LLE to assess for infection, in setting of 2/4 SIRS criteria (, WBC 14.4). Has had multiple past hospitalizations for left foot wound infections and associated LLE cellulitis. -Met SIRS criteria on admission, LA wnl, blood cultures with no growth in 2 days  - Low suspicion for sepsis but warrants full evaluation.  -Wound care and podiatry consulted  -Risk management on consult.     Hypertension - stable  - Will resume home BP meds (Lisinopril) as BPs trend. - Will continue to monitor at this time and readjust as BP's trend.     Diabetes Mellitus T2  Per chart review, patient has previously uncontrolled diabetes, on Lantus daily.    - A1C this admission of 11.6  - Discontinued home medications of Metformin and Actos. - Continue home Lantus 35U units qhs  - Insulin Sliding Scale normal sensitivity with AC&HS glucose checks.   - Hypoglycemia protocols ordered.     Cerebral Palsy  -Continue home Requip     FEN/GI - NPO. LR at 250 mL/hr. Activity - Out of bed with assistance  DVT prophylaxis - Lovenox  GI prophylaxis - Protonix  Fall prophylaxis - Not indicated at this time. Disposition -Plan to d/c to Home. Consulting PT/OT/CM  Code Status - DNR, discussed with patient / caregivers. Next of Lenard 69 Name and Jacques ()- Will be staying with patient for entire stay     Patient Abdi Duarte will be discussed Dr. Charo Dowell. Vickey Nina MD  Family Medicine Resident         CC:     Subjective  No acute events overnight. Inpatient Medications  Current Facility-Administered Medications   Medication Dose Route Frequency    oxyCODONE IR (ROXICODONE) tablet 10 mg  10 mg Oral Q4H PRN    morphine injection 1 mg  1 mg IntraVENous Q4H PRN    cefepime (MAXIPIME) 2 g in 0.9% sodium chloride (MBP/ADV) 50 mL  2 g IntraVENous Q12H    sodium chloride (NS) flush 5-10 mL  5-10 mL IntraVENous Q8H    sodium chloride (NS) flush 5-10 mL  5-10 mL IntraVENous PRN    ondansetron (ZOFRAN) injection 4 mg  4 mg IntraVENous Q4H PRN    enoxaparin (LOVENOX) injection 40 mg  40 mg SubCUTAneous Q24H    insulin glargine (LANTUS) injection 35 Units  35 Units SubCUTAneous DAILY    insulin lispro (HUMALOG) injection   SubCUTAneous AC&HS    glucose chewable tablet 16 g  4 Tab Oral PRN    dextrose (D50W) injection syrg 12.5-25 g  12.5-25 g IntraVENous PRN    glucagon (GLUCAGEN) injection 1 mg  1 mg IntraMUSCular PRN    lactated Ringers infusion  150 mL/hr IntraVENous CONTINUOUS         Allergies  Allergies   Allergen Reactions    Potassium Other (comments)     Patient states she will refuse IV Potassium due to irritation-Patient will accept PO form.     Anaprox [Naproxen Sodium] Other (comments)     Blood pressure drops  Tolerates ibuprofen    Codeine Nausea and Vomiting     Tolerates oxycodone/acetaminophen    Mobic [Meloxicam] Other (comments)     Blood pressure drops  Tolerates ibuprofen  Tylenol [Acetaminophen] Other (comments)     \"Stomach hurts\", hx of Crohn's disease          Objective  Vitals:  Patient Vitals for the past 8 hrs:   Temp Pulse Resp BP SpO2   01/13/18 1113 98 °F (36.7 °C) 86 16 137/84 97 %   01/13/18 0723 97.7 °F (36.5 °C) 83 18 127/74 95 %         I/O:    Intake/Output Summary (Last 24 hours) at 01/13/18 1313  Last data filed at 01/13/18 0926   Gross per 24 hour   Intake          6494.67 ml   Output              250 ml   Net          6244.67 ml     Last shift:    01/13 0701 - 01/13 1900  In: 5006.7 [I.V.:5006.7]  Out: -   Last 3 shifts:    01/11 1901 - 01/13 0700  In: 1804 [I.V.:3538]  Out: 650 [Urine:650]    Physical Exam:  General: No acute distress. Alert. Cooperative. Head: Normocephalic. Atraumatic. Eyes:  Conjunctiva pink. Sclera white. PERRL. Nose:  Septum midline. Mucosa pink. No drainage. Throat: Mucosa pink. Moist mucous membranes. No tonsillar exudates or erythema. Palate movement equal bilaterally. Respiratory: CTAB. No w/r/r/c.   Cardiovascular: RRR. Normal S1,S2. No m/r/g. Pulses 2+ throughout. GI: + bowel sounds. Mild midline tenderness to deep palpation. No rebound tenderness or guarding. Non distended. Midline incisional hernia. Extremities: No edema. No tenderness. Left foot wound dressing in place. Left Heel ulcer with dressing in place. Pt denies evaluation of heel ulcer.      Laboratory Data  Recent Results (from the past 12 hour(s))   CBC W/O DIFF    Collection Time: 01/13/18  6:15 AM   Result Value Ref Range    WBC 10.3 3.6 - 11.0 K/uL    RBC 4.11 3.80 - 5.20 M/uL    HGB 11.3 (L) 11.5 - 16.0 g/dL    HCT 34.6 (L) 35.0 - 47.0 %    MCV 84.2 80.0 - 99.0 FL    MCH 27.5 26.0 - 34.0 PG    MCHC 32.7 30.0 - 36.5 g/dL    RDW 12.9 11.5 - 14.5 %    PLATELET 747 584 - 639 K/uL   METABOLIC PANEL, COMPREHENSIVE    Collection Time: 01/13/18  6:15 AM   Result Value Ref Range    Sodium 141 136 - 145 mmol/L    Potassium 3.2 (L) 3.5 - 5.1 mmol/L    Chloride 105 97 - 108 mmol/L    CO2 25 21 - 32 mmol/L    Anion gap 11 5 - 15 mmol/L    Glucose 144 (H) 65 - 100 mg/dL    BUN 6 6 - 20 MG/DL    Creatinine 0.51 (L) 0.55 - 1.02 MG/DL    BUN/Creatinine ratio 12 12 - 20      GFR est AA >60 >60 ml/min/1.73m2    GFR est non-AA >60 >60 ml/min/1.73m2    Calcium 8.5 8.5 - 10.1 MG/DL    Bilirubin, total 0.2 0.2 - 1.0 MG/DL    ALT (SGPT) 40 12 - 78 U/L    AST (SGOT) 35 15 - 37 U/L    Alk.  phosphatase 119 (H) 45 - 117 U/L    Protein, total 6.0 (L) 6.4 - 8.2 g/dL    Albumin 2.6 (L) 3.5 - 5.0 g/dL    Globulin 3.4 2.0 - 4.0 g/dL    A-G Ratio 0.8 (L) 1.1 - 2.2     GLUCOSE, POC    Collection Time: 01/13/18  7:06 AM   Result Value Ref Range    Glucose (POC) 148 (H) 65 - 100 mg/dL    Performed by Sai Singh Ei    GLUCOSE, POC    Collection Time: 01/13/18 10:49 AM   Result Value Ref Range    Glucose (POC) 256 (H) 65 - 100 mg/dL    Performed by [address]  None      Hospital Problems:  Hospital Problems  Date Reviewed: 1/10/2018          Codes Class Noted POA    Acute cystitis ICD-10-CM: N30.00  ICD-9-CM: 595.0  1/12/2018 Yes        Pancreatitis ICD-10-CM: K85.90  ICD-9-CM: 966.6  1/11/2018 Yes        Arterial insufficiency with ischemic ulcer ([facility] [address] ICD-10-CM: I77.1, L98.499  ICD-9-CM: 447.1, 707.9  3/3/2016 Yes        Wound of left ankle ICD-10-CM: P22.980Y  ICD-9-CM: 891.0  8/30/2015 Yes

## 2018-01-13 NOTE — ROUTINE PROCESS
Bedside shift change report given to  Zaria Clark (oncoming nurse) by Deni Hinson (offgoing nurse). Report included the following information SBAR.

## 2018-01-14 LAB
ALBUMIN SERPL-MCNC: 2.5 G/DL (ref 3.5–5)
ALBUMIN/GLOB SERPL: 0.7 {RATIO} (ref 1.1–2.2)
ALP SERPL-CCNC: 131 U/L (ref 45–117)
ALT SERPL-CCNC: 37 U/L (ref 12–78)
ANION GAP SERPL CALC-SCNC: 8 MMOL/L (ref 5–15)
AST SERPL-CCNC: 19 U/L (ref 15–37)
BILIRUB SERPL-MCNC: 0.2 MG/DL (ref 0.2–1)
BUN SERPL-MCNC: 3 MG/DL (ref 6–20)
BUN/CREAT SERPL: 6 (ref 12–20)
CALCIUM SERPL-MCNC: 8.7 MG/DL (ref 8.5–10.1)
CHLORIDE SERPL-SCNC: 103 MMOL/L (ref 97–108)
CO2 SERPL-SCNC: 28 MMOL/L (ref 21–32)
CREAT SERPL-MCNC: 0.52 MG/DL (ref 0.55–1.02)
ERYTHROCYTE [DISTWIDTH] IN BLOOD BY AUTOMATED COUNT: 12.7 % (ref 11.5–14.5)
GLOBULIN SER CALC-MCNC: 3.6 G/DL (ref 2–4)
GLUCOSE BLD STRIP.AUTO-MCNC: 213 MG/DL (ref 65–100)
GLUCOSE BLD STRIP.AUTO-MCNC: 273 MG/DL (ref 65–100)
GLUCOSE BLD STRIP.AUTO-MCNC: 312 MG/DL (ref 65–100)
GLUCOSE BLD STRIP.AUTO-MCNC: 333 MG/DL (ref 65–100)
GLUCOSE SERPL-MCNC: 235 MG/DL (ref 65–100)
HCT VFR BLD AUTO: 33.2 % (ref 35–47)
HGB BLD-MCNC: 11.5 G/DL (ref 11.5–16)
MCH RBC QN AUTO: 28.5 PG (ref 26–34)
MCHC RBC AUTO-ENTMCNC: 34.6 G/DL (ref 30–36.5)
MCV RBC AUTO: 82.4 FL (ref 80–99)
PLATELET # BLD AUTO: 324 K/UL (ref 150–400)
POTASSIUM SERPL-SCNC: 2.8 MMOL/L (ref 3.5–5.1)
PROT SERPL-MCNC: 6.1 G/DL (ref 6.4–8.2)
RBC # BLD AUTO: 4.03 M/UL (ref 3.8–5.2)
SERVICE CMNT-IMP: ABNORMAL
SODIUM SERPL-SCNC: 139 MMOL/L (ref 136–145)
WBC # BLD AUTO: 9.7 K/UL (ref 3.6–11)

## 2018-01-14 PROCEDURE — 80053 COMPREHEN METABOLIC PANEL: CPT | Performed by: FAMILY MEDICINE

## 2018-01-14 PROCEDURE — 85027 COMPLETE CBC AUTOMATED: CPT | Performed by: FAMILY MEDICINE

## 2018-01-14 PROCEDURE — 77030038269 HC DRN EXT URIN PURWCK BARD -A

## 2018-01-14 PROCEDURE — 74011250637 HC RX REV CODE- 250/637: Performed by: FAMILY MEDICINE

## 2018-01-14 PROCEDURE — 74011636637 HC RX REV CODE- 636/637: Performed by: FAMILY MEDICINE

## 2018-01-14 PROCEDURE — 74011250637 HC RX REV CODE- 250/637: Performed by: STUDENT IN AN ORGANIZED HEALTH CARE EDUCATION/TRAINING PROGRAM

## 2018-01-14 PROCEDURE — 74011250636 HC RX REV CODE- 250/636: Performed by: FAMILY MEDICINE

## 2018-01-14 PROCEDURE — 74011250636 HC RX REV CODE- 250/636: Performed by: STUDENT IN AN ORGANIZED HEALTH CARE EDUCATION/TRAINING PROGRAM

## 2018-01-14 PROCEDURE — 65270000029 HC RM PRIVATE

## 2018-01-14 PROCEDURE — 82962 GLUCOSE BLOOD TEST: CPT

## 2018-01-14 PROCEDURE — 36415 COLL VENOUS BLD VENIPUNCTURE: CPT | Performed by: FAMILY MEDICINE

## 2018-01-14 RX ORDER — POTASSIUM CHLORIDE 1.5 G/1.77G
40 POWDER, FOR SOLUTION ORAL 2 TIMES DAILY WITH MEALS
Status: DISCONTINUED | OUTPATIENT
Start: 2018-01-14 | End: 2018-01-15

## 2018-01-14 RX ADMIN — MORPHINE SULFATE 1 MG: 2 INJECTION, SOLUTION INTRAMUSCULAR; INTRAVENOUS at 13:02

## 2018-01-14 RX ADMIN — MORPHINE SULFATE 1 MG: 2 INJECTION, SOLUTION INTRAMUSCULAR; INTRAVENOUS at 22:13

## 2018-01-14 RX ADMIN — ENOXAPARIN SODIUM 40 MG: 40 INJECTION SUBCUTANEOUS at 22:13

## 2018-01-14 RX ADMIN — INSULIN LISPRO 4 UNITS: 100 INJECTION, SOLUTION INTRAVENOUS; SUBCUTANEOUS at 22:14

## 2018-01-14 RX ADMIN — CEPHALEXIN 500 MG: 250 CAPSULE ORAL at 22:13

## 2018-01-14 RX ADMIN — POTASSIUM CHLORIDE 40 MEQ: 1.5 POWDER, FOR SOLUTION ORAL at 08:08

## 2018-01-14 RX ADMIN — Medication 10 ML: at 05:35

## 2018-01-14 RX ADMIN — MORPHINE SULFATE 1 MG: 2 INJECTION, SOLUTION INTRAMUSCULAR; INTRAVENOUS at 01:27

## 2018-01-14 RX ADMIN — INSULIN LISPRO 4 UNITS: 100 INJECTION, SOLUTION INTRAVENOUS; SUBCUTANEOUS at 08:09

## 2018-01-14 RX ADMIN — MORPHINE SULFATE 1 MG: 2 INJECTION, SOLUTION INTRAMUSCULAR; INTRAVENOUS at 05:34

## 2018-01-14 RX ADMIN — POTASSIUM CHLORIDE 40 MEQ: 1.5 POWDER, FOR SOLUTION ORAL at 17:43

## 2018-01-14 RX ADMIN — INSULIN GLARGINE 35 UNITS: 100 INJECTION, SOLUTION SUBCUTANEOUS at 08:09

## 2018-01-14 RX ADMIN — Medication 10 ML: at 22:13

## 2018-01-14 RX ADMIN — INSULIN LISPRO 5 UNITS: 100 INJECTION, SOLUTION INTRAVENOUS; SUBCUTANEOUS at 12:15

## 2018-01-14 RX ADMIN — CEPHALEXIN 500 MG: 250 CAPSULE ORAL at 08:14

## 2018-01-14 RX ADMIN — INSULIN LISPRO 10 UNITS: 100 INJECTION, SOLUTION INTRAVENOUS; SUBCUTANEOUS at 17:43

## 2018-01-14 RX ADMIN — Medication 10 ML: at 14:00

## 2018-01-14 NOTE — PROGRESS NOTES
Bedside and Verbal shift change report given to [address] [place] [address] (oncoming nurse) by Domenic Gaviria (offgoing nurse). Report included the following information SBAR, Kardex, Procedure Summary, Intake/Output, Accordion and Recent Results.

## 2018-01-14 NOTE — PROGRESS NOTES
[facility] MEDICINE RESIDENCY PROGRAM   Daily Progress Note    Date: 1/14/2018    Assessment/Plan:   Eileen Benjamin is a 59 y.o. female with known cerebral palsy, Diabetes requiring insulin, GERD, Crohn's and Hx of  polio who is admitted for acute pancreatitis.     Overnight Events: Pt did not tolerate GI lite and was changed back to full liquid, due to n and abd pain. Acute Pancreatitis. No previous Hx of pancreatitis, and had past cholecystectomy. Lipase  801, down to 386. UDS with opiates. Did not tolerate Gi lite diet. - Continue onfull diet and advance as tolerated  - Increase LR to 125ml/hr from 75ml/hr.   - Continue Oxicodone 10mg PO with Morphine 1mg PRN for breakthrough pain. - Continue Zofran 4mg PO q6h and Protonix    Asymptomatic Uncomplicated UTI. UA with 2+ Bacteria and Nitrites. UCx Pan sensitive Klebsiela. Was on Cefepime for 2 days. - Continue Keflex for 1more day.      Diabetic Foot Ulcer. Outpatient wound care by PCP and Home Health per patient, currently wound is dressed, patient repeatedly refused to allow for examination of wound and LLE to assess for infection, in setting of 2/4 SIRS criteria (, WBC 14.4). Has had multiple past hospitalizations for left foot wound infections and associated LLE cellulitis. Met SIRS criteria on admission, LA wnl, blood cultures with no growth in 2 days.   -Wound care and podiatry following, appreciate recs. -Risk management on consult.     Hypertension - stable  - Will resume home BP meds (Lisinopril) as BPs trend.     Diabetes Mellitus T2. A1C 11.6. Per chart review, patient has previously uncontrolled diabetes (home Novolog mix 70-30, actos 30mg and metformin 1000mg BID). - Hold home medications of Metformin and Actos. - Continue home Lantus 35U units qhs  - Insulin Sliding Scale normal sensitivity with AC&HS glucose checks.   - Hypoglycemia protocols ordered.     Cerebral Palsy  -Continue home Requip     FEN/GI - Full liquid diet. LR at 125 mL/hr. Activity - Out of bed with assistance  DVT prophylaxis - Lovenox  GI prophylaxis - Protonix  Fall prophylaxis - Not indicated at this time. Disposition -Plan to d/c to Home. Consulting PT/OT/CM  Code Status - DNR, discussed with patient / caregivers. Next of Lenard 69 Name and Jacques ()- Will be staying with patient for entire stay     Patient Biju Almanza will be discussed Dr. Darling Coker. Evette Shankar MD  Family Medicine Resident         CC: Fluid    Subjective  Had abdominal complaint and was reduced to full liquid diet from GI lite. Says it is worse from yesterday and that the morphine is better than the roxicodone. Denies CP, sob, swelling. Inpatient Medications  Current Facility-Administered Medications   Medication Dose Route Frequency    potassium chloride (KLOR-CON) packet 40 mEq  40 mEq Oral BID WITH MEALS    oxyCODONE IR (ROXICODONE) tablet 10 mg  10 mg Oral Q4H PRN    morphine injection 1 mg  1 mg IntraVENous Q4H PRN    ondansetron (ZOFRAN ODT) tablet 4 mg  4 mg Oral Q6H PRN    cephALEXin (KEFLEX) capsule 500 mg  500 mg Oral Q12H    sodium chloride (NS) flush 5-10 mL  5-10 mL IntraVENous Q8H    sodium chloride (NS) flush 5-10 mL  5-10 mL IntraVENous PRN    enoxaparin (LOVENOX) injection 40 mg  40 mg SubCUTAneous Q24H    insulin glargine (LANTUS) injection 35 Units  35 Units SubCUTAneous DAILY    insulin lispro (HUMALOG) injection   SubCUTAneous AC&HS    glucose chewable tablet 16 g  4 Tab Oral PRN    dextrose (D50W) injection syrg 12.5-25 g  12.5-25 g IntraVENous PRN    glucagon (GLUCAGEN) injection 1 mg  1 mg IntraMUSCular PRN    lactated Ringers infusion  75 mL/hr IntraVENous CONTINUOUS         Allergies  Allergies   Allergen Reactions    Potassium Other (comments)     Patient states she will refuse IV Potassium due to irritation-Patient will accept PO form.     Anaprox [Naproxen Sodium] Other (comments)     Blood pressure drops  Tolerates ibuprofen    Codeine Nausea and Vomiting     Tolerates oxycodone/acetaminophen    Mobic [Meloxicam] Other (comments)     Blood pressure drops  Tolerates ibuprofen    Tylenol [Acetaminophen] Other (comments)     \"Stomach hurts\", hx of Crohn's disease          Objective  Vitals:  Patient Vitals for the past 8 hrs:   Temp Pulse Resp BP SpO2   01/14/18 0719 98.6 °F (37 °C) 86 18 162/75 97 %   01/14/18 0429 98.4 °F (36.9 °C) 85 16 161/78 96 %   01/14/18 0013 98.9 °F (37.2 °C) 80 16 154/76 96 %         I/O:    Intake/Output Summary (Last 24 hours) at 01/14/18 0747  Last data filed at 01/14/18 0536   Gross per 24 hour   Intake          7282.67 ml   Output             2650 ml   Net          4632.67 ml     Last shift:       Last 3 shifts:    01/12 1901 - 01/14 0700  In: 8770.7 [I.V.:8770.7]  Out: 2900 [Urine:2900]    Physical Exam:  General: No acute distress. Alert. Cooperative. Head: Normocephalic. Atraumatic. Eyes:  Conjunctiva pink. Sclera white. PERRL. Nose:  Septum midline. Mucosa pink. No drainage. Throat: Mucosa pink. Moist mucous membranes. No tonsillar exudates or erythema. Palate movement equal bilaterally. Respiratory: CTAB. No w/r/r/c.   Cardiovascular: RRR. Normal S1,S2. No m/r/g. Pulses 2+ throughout. GI: + bowel sounds. Mild tenderness to deep palpation. No rebound tenderness or guarding. Non distended. Midline incisional hernia. Extremities: No edema. No tenderness. Left foot wound dressing in place. Left Heel ulcer with dressing in place. Pt denies evaluation of heel ulcer.      Laboratory Data  Recent Results (from the past 12 hour(s))   GLUCOSE, POC    Collection Time: 01/13/18  9:02 PM   Result Value Ref Range    Glucose (POC) 188 (H) 65 - 100 mg/dL    Performed by Rox Prado    CBC W/O DIFF    Collection Time: 01/14/18  1:15 AM   Result Value Ref Range    WBC 9.7 3.6 - 11.0 K/uL    RBC 4.03 3.80 - 5.20 M/uL    HGB 11.5 11.5 - 16.0 g/dL    HCT 33.2 (L) 35.0 - 47.0 % MCV 82.4 80.0 - 99.0 FL    MCH 28.5 26.0 - 34.0 PG    MCHC 34.6 30.0 - 36.5 g/dL    RDW 12.7 11.5 - 14.5 %    PLATELET 357 547 - 014 K/uL   METABOLIC PANEL, COMPREHENSIVE    Collection Time: 01/14/18  1:15 AM   Result Value Ref Range    Sodium 139 136 - 145 mmol/L    Potassium 2.8 (L) 3.5 - 5.1 mmol/L    Chloride 103 97 - 108 mmol/L    CO2 28 21 - 32 mmol/L    Anion gap 8 5 - 15 mmol/L    Glucose 235 (H) 65 - 100 mg/dL    BUN 3 (L) 6 - 20 MG/DL    Creatinine 0.52 (L) 0.55 - 1.02 MG/DL    BUN/Creatinine ratio 6 (L) 12 - 20      GFR est AA >60 >60 ml/min/1.73m2    GFR est non-AA >60 >60 ml/min/1.73m2    Calcium 8.7 8.5 - 10.1 MG/DL    Bilirubin, total 0.2 0.2 - 1.0 MG/DL    ALT (SGPT) 37 12 - 78 U/L    AST (SGOT) 19 15 - 37 U/L    Alk.  phosphatase 131 (H) 45 - 117 U/L    Protein, total 6.1 (L) 6.4 - 8.2 g/dL    Albumin 2.5 (L) 3.5 - 5.0 g/dL    Globulin 3.6 2.0 - 4.0 g/dL    A-G Ratio 0.7 (L) 1.1 - 2.2     GLUCOSE, POC    Collection Time: 01/14/18  7:16 AM   Result Value Ref Range    Glucose (POC) 213 (H) 65 - 100 mg/dL    Performed by [address]  None      Hospital Problems:  Hospital Problems  Date Reviewed: 1/10/2018          Codes Class Noted POA    Acute cystitis ICD-10-CM: N30.00  ICD-9-CM: 595.0  1/12/2018 Yes        * (Principal)Pancreatitis ICD-10-CM: K85.90  ICD-9-CM: 775.2  1/11/2018 Yes        Arterial insufficiency with ischemic ulcer ([facility] [address] ICD-10-CM: I77.1, L98.499  ICD-9-CM: 447.1, 707.9  3/3/2016 Yes        Wound of left ankle ICD-10-CM: T18.367O  ICD-9-CM: 891.0  8/30/2015 Yes

## 2018-01-14 NOTE — PROGRESS NOTES
Problem: Falls - Risk of  Goal: *Absence of Falls  Document Parveen Fall Risk and appropriate interventions in the flowsheet.    Fall Risk Interventions:  Mobility Interventions: Patient to call before getting OOB         Medication Interventions: Teach patient to arise slowly, Patient to call before getting OOB    Elimination Interventions: Call light in reach, Patient to call for help with toileting needs

## 2018-01-14 NOTE — PROGRESS NOTES
Bedside shift change report given to Maryanne castorena RN (oncoming nurse) by Jacques Hauser RN (offgoing nurse). Report included the following information SBAR, Kardex and MAR.     19:30 MD made aware that patient stated that GI Lite diet was causing some abdominal pain and that she wanted to go back to the full liquid diet. MD changed the order.

## 2018-01-15 LAB
ALBUMIN SERPL-MCNC: 2.6 G/DL (ref 3.5–5)
ALBUMIN/GLOB SERPL: 0.7 {RATIO} (ref 1.1–2.2)
ALP SERPL-CCNC: 139 U/L (ref 45–117)
ALT SERPL-CCNC: 34 U/L (ref 12–78)
ANION GAP SERPL CALC-SCNC: 5 MMOL/L (ref 5–15)
AST SERPL-CCNC: 15 U/L (ref 15–37)
BILIRUB SERPL-MCNC: 0.1 MG/DL (ref 0.2–1)
BUN SERPL-MCNC: 4 MG/DL (ref 6–20)
BUN/CREAT SERPL: 7 (ref 12–20)
CALCIUM SERPL-MCNC: 8.3 MG/DL (ref 8.5–10.1)
CHLORIDE SERPL-SCNC: 100 MMOL/L (ref 97–108)
CO2 SERPL-SCNC: 30 MMOL/L (ref 21–32)
CREAT SERPL-MCNC: 0.55 MG/DL (ref 0.55–1.02)
ERYTHROCYTE [DISTWIDTH] IN BLOOD BY AUTOMATED COUNT: 12.9 % (ref 11.5–14.5)
GLOBULIN SER CALC-MCNC: 3.6 G/DL (ref 2–4)
GLUCOSE BLD STRIP.AUTO-MCNC: 214 MG/DL (ref 65–100)
GLUCOSE BLD STRIP.AUTO-MCNC: 254 MG/DL (ref 65–100)
GLUCOSE BLD STRIP.AUTO-MCNC: 362 MG/DL (ref 65–100)
GLUCOSE BLD STRIP.AUTO-MCNC: 374 MG/DL (ref 65–100)
GLUCOSE SERPL-MCNC: 277 MG/DL (ref 65–100)
HCT VFR BLD AUTO: 35.3 % (ref 35–47)
HGB BLD-MCNC: 12.1 G/DL (ref 11.5–16)
MCH RBC QN AUTO: 28.3 PG (ref 26–34)
MCHC RBC AUTO-ENTMCNC: 34.3 G/DL (ref 30–36.5)
MCV RBC AUTO: 82.7 FL (ref 80–99)
PLATELET # BLD AUTO: 351 K/UL (ref 150–400)
POTASSIUM SERPL-SCNC: 3.5 MMOL/L (ref 3.5–5.1)
PROT SERPL-MCNC: 6.2 G/DL (ref 6.4–8.2)
RBC # BLD AUTO: 4.27 M/UL (ref 3.8–5.2)
SERVICE CMNT-IMP: ABNORMAL
SODIUM SERPL-SCNC: 135 MMOL/L (ref 136–145)
WBC # BLD AUTO: 12.4 K/UL (ref 3.6–11)

## 2018-01-15 PROCEDURE — 36415 COLL VENOUS BLD VENIPUNCTURE: CPT | Performed by: FAMILY MEDICINE

## 2018-01-15 PROCEDURE — 74011250636 HC RX REV CODE- 250/636: Performed by: STUDENT IN AN ORGANIZED HEALTH CARE EDUCATION/TRAINING PROGRAM

## 2018-01-15 PROCEDURE — 74011250637 HC RX REV CODE- 250/637: Performed by: FAMILY MEDICINE

## 2018-01-15 PROCEDURE — 74011250636 HC RX REV CODE- 250/636: Performed by: FAMILY MEDICINE

## 2018-01-15 PROCEDURE — 82962 GLUCOSE BLOOD TEST: CPT

## 2018-01-15 PROCEDURE — 80053 COMPREHEN METABOLIC PANEL: CPT | Performed by: FAMILY MEDICINE

## 2018-01-15 PROCEDURE — 85027 COMPLETE CBC AUTOMATED: CPT | Performed by: FAMILY MEDICINE

## 2018-01-15 PROCEDURE — 65270000029 HC RM PRIVATE

## 2018-01-15 PROCEDURE — 74011636637 HC RX REV CODE- 636/637: Performed by: FAMILY MEDICINE

## 2018-01-15 PROCEDURE — 74011250637 HC RX REV CODE- 250/637: Performed by: STUDENT IN AN ORGANIZED HEALTH CARE EDUCATION/TRAINING PROGRAM

## 2018-01-15 PROCEDURE — 77030038269 HC DRN EXT URIN PURWCK BARD -A

## 2018-01-15 RX ORDER — INSULIN GLARGINE 100 [IU]/ML
45 INJECTION, SOLUTION SUBCUTANEOUS DAILY
Status: DISCONTINUED | OUTPATIENT
Start: 2018-01-16 | End: 2018-01-16 | Stop reason: HOSPADM

## 2018-01-15 RX ORDER — LISINOPRIL 20 MG/1
20 TABLET ORAL DAILY
Status: DISCONTINUED | OUTPATIENT
Start: 2018-01-15 | End: 2018-01-16 | Stop reason: HOSPADM

## 2018-01-15 RX ADMIN — INSULIN GLARGINE 35 UNITS: 100 INJECTION, SOLUTION SUBCUTANEOUS at 09:41

## 2018-01-15 RX ADMIN — INSULIN LISPRO 12 UNITS: 100 INJECTION, SOLUTION INTRAVENOUS; SUBCUTANEOUS at 16:34

## 2018-01-15 RX ADMIN — LISINOPRIL 20 MG: 20 TABLET ORAL at 09:41

## 2018-01-15 RX ADMIN — SODIUM CHLORIDE, SODIUM LACTATE, POTASSIUM CHLORIDE, AND CALCIUM CHLORIDE 125 ML/HR: 600; 310; 30; 20 INJECTION, SOLUTION INTRAVENOUS at 12:10

## 2018-01-15 RX ADMIN — INSULIN LISPRO 4 UNITS: 100 INJECTION, SOLUTION INTRAVENOUS; SUBCUTANEOUS at 09:41

## 2018-01-15 RX ADMIN — SODIUM CHLORIDE, SODIUM LACTATE, POTASSIUM CHLORIDE, AND CALCIUM CHLORIDE 125 ML/HR: 600; 310; 30; 20 INJECTION, SOLUTION INTRAVENOUS at 03:47

## 2018-01-15 RX ADMIN — OXYCODONE HYDROCHLORIDE 10 MG: 5 TABLET ORAL at 02:02

## 2018-01-15 RX ADMIN — ENOXAPARIN SODIUM 40 MG: 40 INJECTION SUBCUTANEOUS at 22:03

## 2018-01-15 RX ADMIN — MORPHINE SULFATE 1 MG: 2 INJECTION, SOLUTION INTRAMUSCULAR; INTRAVENOUS at 22:12

## 2018-01-15 RX ADMIN — SODIUM CHLORIDE, SODIUM LACTATE, POTASSIUM CHLORIDE, AND CALCIUM CHLORIDE 125 ML/HR: 600; 310; 30; 20 INJECTION, SOLUTION INTRAVENOUS at 20:36

## 2018-01-15 RX ADMIN — CEPHALEXIN 500 MG: 250 CAPSULE ORAL at 09:41

## 2018-01-15 RX ADMIN — INSULIN LISPRO 8 UNITS: 100 INJECTION, SOLUTION INTRAVENOUS; SUBCUTANEOUS at 22:04

## 2018-01-15 RX ADMIN — MORPHINE SULFATE 1 MG: 2 INJECTION, SOLUTION INTRAMUSCULAR; INTRAVENOUS at 17:59

## 2018-01-15 RX ADMIN — MORPHINE SULFATE 1 MG: 2 INJECTION, SOLUTION INTRAMUSCULAR; INTRAVENOUS at 12:10

## 2018-01-15 RX ADMIN — Medication 10 ML: at 22:04

## 2018-01-15 RX ADMIN — Medication 10 ML: at 14:58

## 2018-01-15 RX ADMIN — CEPHALEXIN 500 MG: 250 CAPSULE ORAL at 22:04

## 2018-01-15 RX ADMIN — MORPHINE SULFATE 1 MG: 2 INJECTION, SOLUTION INTRAMUSCULAR; INTRAVENOUS at 06:05

## 2018-01-15 RX ADMIN — Medication 10 ML: at 05:19

## 2018-01-15 RX ADMIN — INSULIN LISPRO 7 UNITS: 100 INJECTION, SOLUTION INTRAVENOUS; SUBCUTANEOUS at 12:05

## 2018-01-15 NOTE — PROGRESS NOTES
1515:  A referral was sent via safemail to Lt. Wale Pollack to enroll pt in [facility] at discharge. SW order and wound care orders added to home health. Accepted by [employer]. JUNIOR Guzman     Note:  Order to resume home health noted. A referral was sent in [address] [place] [address] to [facility].   JUNIOR Guzman

## 2018-01-15 NOTE — PROGRESS NOTES
[facility]   Daily Progress Note    Date: 1/15/2018    Assessment/Plan:   Jose J Nair is a 59 y.o. female with known cerebral palsy, Diabetes requiring insulin, GERD, Crohn's and Hx of  polio who is admitted for acute pancreatitis.     Overnight Events: Pt intolerant of GI Lyte diet yesterday. Downgraded to full liquids. Seen and examined this morning. Complains of 3/10 abdominal pain overnight. Denies any nausea or vomiting. Acute Pancreatitis. No previous Hx of pancreatitis, and had past cholecystectomy. Lipase  801, down to 386. UDS with opiates. - Pt intolerant of Gi lite diet. Downgraded to full liquids with 3/10 pain. - Will maintain full liquid  - Continue LR at 125ml/hr    - Continue Oxicodone 10mg PO with Morphine 1mg PRN for breakthrough pain. - Continue Zofran 4mg PO q6h and Protonix    Asymptomatic Uncomplicated UTI. UA with 2+ Bacteria and Nitrites. UCx Pan sensitive Klebsiela. Was on Cefepime for 2 days. - Completed Keflex. Will d/c antibiotics     Diabetic Foot Ulcer. Outpatient wound care by PCP and Home Health per patient, currently wound is dressed, patient repeatedly refused to allow for examination of wound and LLE to assess for infection, in setting of 2/4 SIRS criteria (, WBC 14.4). Has had multiple past hospitalizations for left foot wound infections and associated LLE cellulitis. Met SIRS criteria on admission, LA wnl, blood cultures with no growth in 2 days.   -Wound care and podiatry consulted. PATIENT STILL REFUSING EXAMINATION OF FOOT WOUND.  -Risk management on consult.     Hypertension - stable  - Will resume home lisinopril 20mg     Diabetes Mellitus T2. A1C 11.6. Per chart review, patient has previously uncontrolled diabetes (home Novolog mix 70-30, actos 30mg and metformin 1000mg BID). - Hold home medications of Metformin and Actos.   - Continue home Lantus 35U units qhs  - Insulin Sliding Scale normal sensitivity with AC&HS glucose checks. - Hypoglycemia protocols ordered.     Cerebral Palsy  - Continue home Requip     FEN/GI - Full liquid diet. LR at 125 mL/hr. Activity - Out of bed with assistance  DVT prophylaxis - Lovenox  GI prophylaxis - Protonix  Fall prophylaxis - Not indicated at this time. Disposition -Plan to d/c to Home. PT/OT/CM  Code Status - DNR, discussed with patient / caregivers. Next of Lenard 69 Name and [place] ()- Will be staying with patient for entire stay     Patient Vin Bold will be discussed Dr. Scooter Cramer. Nereida James MD  Family Medicine Resident         CC: Abdominal pain, nausea    Subjective  Pt intolerant of GI Lyte diet yesterday. Downgraded to full liquids. Seen and examined this morning. Complains of 3/10 abdominal pain overnight. Denies any nausea or vomiting. Inpatient Medications  Current Facility-Administered Medications   Medication Dose Route Frequency    oxyCODONE IR (ROXICODONE) tablet 10 mg  10 mg Oral Q4H PRN    morphine injection 1 mg  1 mg IntraVENous Q4H PRN    ondansetron (ZOFRAN ODT) tablet 4 mg  4 mg Oral Q6H PRN    cephALEXin (KEFLEX) capsule 500 mg  500 mg Oral Q12H    sodium chloride (NS) flush 5-10 mL  5-10 mL IntraVENous Q8H    sodium chloride (NS) flush 5-10 mL  5-10 mL IntraVENous PRN    enoxaparin (LOVENOX) injection 40 mg  40 mg SubCUTAneous Q24H    insulin glargine (LANTUS) injection 35 Units  35 Units SubCUTAneous DAILY    insulin lispro (HUMALOG) injection   SubCUTAneous AC&HS    glucose chewable tablet 16 g  4 Tab Oral PRN    dextrose (D50W) injection syrg 12.5-25 g  12.5-25 g IntraVENous PRN    glucagon (GLUCAGEN) injection 1 mg  1 mg IntraMUSCular PRN    lactated Ringers infusion  125 mL/hr IntraVENous CONTINUOUS         Allergies  Allergies   Allergen Reactions    Potassium Other (comments)     Patient states she will refuse IV Potassium due to irritation-Patient will accept PO form.     Anaprox [Naproxen Sodium] Other (comments)     Blood pressure drops  Tolerates ibuprofen    Codeine Nausea and Vomiting     Tolerates oxycodone/acetaminophen    Mobic [Meloxicam] Other (comments)     Blood pressure drops  Tolerates ibuprofen    Tylenol [Acetaminophen] Other (comments)     \"Stomach hurts\", hx of Crohn's disease          Objective  Vitals:  Patient Vitals for the past 8 hrs:   Temp Pulse Resp BP SpO2   01/15/18 0750 98.3 °F (36.8 °C) 86 18 143/70 96 %   01/15/18 0319 97.8 °F (36.6 °C) 85 18 148/80 93 %         I/O:    Intake/Output Summary (Last 24 hours) at 01/15/18 0839  Last data filed at 01/14/18 2109   Gross per 24 hour   Intake                0 ml   Output              800 ml   Net             -800 ml     Last shift:       Last 3 shifts:    01/13 1901 - 01/15 0700  In: 2276 [I.V.:2276]  Out: 3450 [Urine:3450]    Physical Exam:  General: No acute distress. Alert. Cooperative. Head: Normocephalic. Atraumatic. Eyes:  Conjunctiva pink. Sclera white. PERRL. Nose:  Septum midline. Mucosa pink. No drainage. Throat: Mucosa pink. Moist mucous membranes. No tonsillar exudates or erythema. Palate movement equal bilaterally. Respiratory: CTAB. No w/r/r/c.   Cardiovascular: RRR. Normal S1,S2. No m/r/g. Pulses 2+ throughout. GI: + bowel sounds. Mild tenderness to deep palpation. No rebound tenderness or guarding. Non distended. Midline incisional hernia. Extremities: No edema. No tenderness. Left foot wound dressing in place. Left Heel ulcer with dressing in place. Pt denies evaluation of heel ulcer.      Laboratory Data  Recent Results (from the past 12 hour(s))   GLUCOSE, POC    Collection Time: 01/14/18  9:02 PM   Result Value Ref Range    Glucose (POC) 273 (H) 65 - 100 mg/dL    Performed by Arnoldo Garcia    CBC W/O DIFF    Collection Time: 01/15/18  1:37 AM   Result Value Ref Range    WBC 12.4 (H) 3.6 - 11.0 K/uL    RBC 4.27 3.80 - 5.20 M/uL    HGB 12.1 11.5 - 16.0 g/dL    HCT 35.3 35.0 - 47.0 %    MCV 82.7 80.0 - 99.0 FL    MCH 28.3 26.0 - 34.0 PG    MCHC 34.3 30.0 - 36.5 g/dL    RDW 12.9 11.5 - 14.5 %    PLATELET 464 313 - 254 K/uL   METABOLIC PANEL, COMPREHENSIVE    Collection Time: 01/15/18  1:37 AM   Result Value Ref Range    Sodium 135 (L) 136 - 145 mmol/L    Potassium 3.5 3.5 - 5.1 mmol/L    Chloride 100 97 - 108 mmol/L    CO2 30 21 - 32 mmol/L    Anion gap 5 5 - 15 mmol/L    Glucose 277 (H) 65 - 100 mg/dL    BUN 4 (L) 6 - 20 MG/DL    Creatinine 0.55 0.55 - 1.02 MG/DL    BUN/Creatinine ratio 7 (L) 12 - 20      GFR est AA >60 >60 ml/min/1.73m2    GFR est non-AA >60 >60 ml/min/1.73m2    Calcium 8.3 (L) 8.5 - 10.1 MG/DL    Bilirubin, total 0.1 (L) 0.2 - 1.0 MG/DL    ALT (SGPT) 34 12 - 78 U/L    AST (SGOT) 15 15 - 37 U/L    Alk.  phosphatase 139 (H) 45 - 117 U/L    Protein, total 6.2 (L) 6.4 - 8.2 g/dL    Albumin 2.6 (L) 3.5 - 5.0 g/dL    Globulin 3.6 2.0 - 4.0 g/dL    A-G Ratio 0.7 (L) 1.1 - 2.2     GLUCOSE, POC    Collection Time: 01/15/18  7:52 AM   Result Value Ref Range    Glucose (POC) 214 (H) 65 - 100 mg/dL    Performed by Daina Lopez Problems:  Hospital Problems  Date Reviewed: 1/10/2018          Codes Class Noted POA    Acute cystitis ICD-10-CM: N30.00  ICD-9-CM: 595.0  1/12/2018 Yes        * (Principal)Pancreatitis ICD-10-CM: K85.90  ICD-9-CM: 805.0  1/11/2018 Yes        Arterial insufficiency with ischemic ulcer ([facility] ICD-10-CM: I77.1, L98.499  ICD-9-CM: 447.1, 707.9  3/3/2016 Yes        Wound of left ankle ICD-10-CM: Z72.518S  ICD-9-CM: 891.0  8/30/2015 Yes

## 2018-01-15 NOTE — WOUND CARE
Wound care note  Follow up- reconsult per nursing to assess left heel for wound care at home with [facility] nurse. Patient and spouse in agreement for wound nurse to assess and provide wound care, medication for pain prior to woundcare per staff nurse. Assessment  Left foot dressing removed by spouse- patient in pain with any care given  Chronic pale pink wound bed - ~3x3x0.1 cm  full thickness wound, irregular pink rolled edges, light layer of yellow adherent slough, small amount of yellow drainage, constance wound intact, no redness or edema. Treatment  Wound cleansed, moist hydrofera blue applied with moist 4x4, dry 4x4 and kerlix, tolerated well, heel off loaded    Plan of care and new wound recommendations discussed with MD, patient and BS Wound care liaison, orders obtained and written, supplies gathered for discharge and spouse aware  HH to follow with outpatient Wound Clinic visit     Reconsult if needed.   Diana Leal

## 2018-01-15 NOTE — PROGRESS NOTES
Spiritual Care Partner Volunteer visited patient on 1/15/18. Documented by:    Shorty Juan M.S.   Spiritual Care Department  If needs arise please call [facility] ([phone])

## 2018-01-15 NOTE — PROGRESS NOTES
Bedside and Verbal shift change report given to [address] (oncoming nurse) by Saul Olson RN (offgoing nurse). Report included the following information SBAR, Kardex, Intake/Output, MAR, Accordion and Recent Results.

## 2018-01-15 NOTE — PROGRESS NOTES
Bedside and Verbal shift change report given to JUNIOR Longo (oncoming nurse) by Marino Chapa RN (offgoing nurse). Report included the following information SBAR, MAR, Accordion and Recent Results.

## 2018-01-15 NOTE — CDMP QUERY
1. Patient is noted to have a BMI of 32.3 kg. Please clarify if this patient is:     =>Obese (BMI 30 - 39.9)  =>Overweight (BMI 25 - 29.9)  =>Other explanation of clinical findings  =>Unable to determine (no explanation for clinical findings)    Presentation: 4'9\" 149 lbs = BMI 32.3 kg,     REFERENCE:  The [address] [place] [address] has issued a statement indicating that, \"Individuals who are overweight, obese, or morbidly obese are at an increased risk for certain medical conditions when compared to persons of normal weight. Therefore, these conditions are always clinically significant and reportable when documented by the provider. Please clarify and document your clinical opinion in the progress notes and discharge summary, including the definitive and or presumptive diagnosis, (suspected or probable), related to the above clinical findings. Please include clinical findings supporting your diagnosis.      Thank you,  Alma Rosa Cook, MSN, [address] [address]

## 2018-01-15 NOTE — CDMP QUERY
2. Please clarify if this patient is being treated/managed for:    =>Hypokalemia in the setting of Serum K 3.2/2.9; KCL supplements  =>Other Explanation of clinical findings  =>Unable to Determine (no explanation of clinical findings)    The medical record reflects the following clinical findings, treatment, and risk factors:    Risk Factors: Pancreatitis; N/V    Clinical Indicators: 1/13 Serum K 3.2 1/14 Serum K 2.8    Treatment: Monitor labs; KCL supplements    Please clarify and document your clinical opinion in the progress notes and discharge summary including the definitive and/or presumptive diagnosis, (suspected or probable), related to the above clinical findings. Please include clinical findings supporting your diagnosis.     Thank you,  Danny Snell, MSN, [address] [address]

## 2018-01-15 NOTE — CDMP QUERY
3. There is noted documentation of : Type 2 DM uncontrolled for this patient. Type 2 DM cannot be coded as \"uncontrolled\". Could this be further specified as    => Type 2 DM Poorly controlled or Type 2 DM out of control  =>Other Explanation of clinical findings  =>Unable to Determine (no explanation of clinical findings)    The medical record reflects the following clinical findings, treatment, and risk factors:    Risk Factors: DM     Clinical Indicators: 1/11 HgA1C 11.6    Treatment: DTC consult; monitor labs    Please clarify and document your clinical opinion in the progress notes and discharge summary including the definitive and/or presumptive diagnosis, (suspected or probable), related to the above clinical findings. Please include clinical findings supporting your diagnosis.     Thank you,  Sil Cruz, BETSY, [place] [address]

## 2018-01-15 NOTE — DIABETES MGMT
DTC Progress Note     Chart reviewed for elevated glucose. Recommendations/ Comments: Glucose is consistently >200. Please consider the followin. a mealtime insulin while inpatient, such as Humalog 2 units with meals (0.1 units/kg/day). 2. Increasing Lantus from 35 units to 42 units - this is a 20% increase     Fasting glucose: 214 mg/dL. Required 30 units of correction in the past 24 hours. Estimated Creatinine Clearance: 94.6 mL/min (based on Cr of 0.55). POC Glucose last 24hrs:   Lab Results   Component Value Date/Time     (H) 01/15/2018 01:37 AM    GLUCPOC 254 (H) 01/15/2018 11:07 AM    GLUCPOC 214 (H) 01/15/2018 07:52 AM    GLUCPOC 273 (H) 2018 09:02 PM        Inpatient medications for glucose management:  1. Correction Scale: Lispro (Humalog) Insulin Resistant Sensitivity scale to cover for glucose > 139 mg/dL before meals and for glucose >199 at bedtime      2. Lantus 35 units       Chart reviewed and initial evaluation complete on [address] [place] [address] . Abhishek Underwood is a 60 yo  female with a past medical history relevant for  DM type 2, per Dr. Prateek Sierra MD's H&P dated 2018. Mrs. Raymond Chin monitors blood glucose at home 2 (times) per day, and denies difficulty obtaining diabetes supplies. Patient reports glucose readings are: in the 500's recently. Currently, pt eats 3 meals a day. She states she used to be on Toujeo, as Dr. Angel Suarez used to provide samples for her. She now takes Novolog Mix 70/30, as these are the samples Dr. Angel Suarez provides. Med Rec has been updated accordingly. Outpatient medications for glucose management per patient and spouse/SO   1. Novolog Mix 70/30 - 10 units before meals        Lab Results   Component Value Date/Time    Hemoglobin A1c 11.6 2018 06:48 PM    Hemoglobin A1c 10.5 10/03/2017 01:49 PM         Thank you. Elisabeth Nazario.  Belinda Cardona, MPH, BSN, [address]

## 2018-01-16 VITALS
WEIGHT: 144.84 LBS | DIASTOLIC BLOOD PRESSURE: 89 MMHG | SYSTOLIC BLOOD PRESSURE: 167 MMHG | BODY MASS INDEX: 31.25 KG/M2 | HEIGHT: 57 IN | OXYGEN SATURATION: 99 % | RESPIRATION RATE: 14 BRPM | TEMPERATURE: 98.6 F | HEART RATE: 91 BPM

## 2018-01-16 LAB
ALBUMIN SERPL-MCNC: 2.7 G/DL (ref 3.5–5)
ALBUMIN/GLOB SERPL: 0.7 {RATIO} (ref 1.1–2.2)
ALP SERPL-CCNC: 129 U/L (ref 45–117)
ALT SERPL-CCNC: 37 U/L (ref 12–78)
ANION GAP SERPL CALC-SCNC: 10 MMOL/L (ref 5–15)
AST SERPL-CCNC: 22 U/L (ref 15–37)
BILIRUB SERPL-MCNC: 0.2 MG/DL (ref 0.2–1)
BUN SERPL-MCNC: 5 MG/DL (ref 6–20)
BUN/CREAT SERPL: 11 (ref 12–20)
CALCIUM SERPL-MCNC: 8.7 MG/DL (ref 8.5–10.1)
CHLORIDE SERPL-SCNC: 100 MMOL/L (ref 97–108)
CO2 SERPL-SCNC: 26 MMOL/L (ref 21–32)
CREAT SERPL-MCNC: 0.47 MG/DL (ref 0.55–1.02)
ERYTHROCYTE [DISTWIDTH] IN BLOOD BY AUTOMATED COUNT: 12.8 % (ref 11.5–14.5)
GLOBULIN SER CALC-MCNC: 3.8 G/DL (ref 2–4)
GLUCOSE BLD STRIP.AUTO-MCNC: 232 MG/DL (ref 65–100)
GLUCOSE SERPL-MCNC: 233 MG/DL (ref 65–100)
HCT VFR BLD AUTO: 36.7 % (ref 35–47)
HGB BLD-MCNC: 12.4 G/DL (ref 11.5–16)
MCH RBC QN AUTO: 27.9 PG (ref 26–34)
MCHC RBC AUTO-ENTMCNC: 33.8 G/DL (ref 30–36.5)
MCV RBC AUTO: 82.7 FL (ref 80–99)
PLATELET # BLD AUTO: 364 K/UL (ref 150–400)
POTASSIUM SERPL-SCNC: 3.7 MMOL/L (ref 3.5–5.1)
PROT SERPL-MCNC: 6.5 G/DL (ref 6.4–8.2)
RBC # BLD AUTO: 4.44 M/UL (ref 3.8–5.2)
SERVICE CMNT-IMP: ABNORMAL
SODIUM SERPL-SCNC: 136 MMOL/L (ref 136–145)
WBC # BLD AUTO: 14.4 K/UL (ref 3.6–11)

## 2018-01-16 PROCEDURE — 74011250637 HC RX REV CODE- 250/637: Performed by: FAMILY MEDICINE

## 2018-01-16 PROCEDURE — 80053 COMPREHEN METABOLIC PANEL: CPT | Performed by: STUDENT IN AN ORGANIZED HEALTH CARE EDUCATION/TRAINING PROGRAM

## 2018-01-16 PROCEDURE — 74011250636 HC RX REV CODE- 250/636: Performed by: STUDENT IN AN ORGANIZED HEALTH CARE EDUCATION/TRAINING PROGRAM

## 2018-01-16 PROCEDURE — 85027 COMPLETE CBC AUTOMATED: CPT | Performed by: STUDENT IN AN ORGANIZED HEALTH CARE EDUCATION/TRAINING PROGRAM

## 2018-01-16 PROCEDURE — 74011636637 HC RX REV CODE- 636/637: Performed by: STUDENT IN AN ORGANIZED HEALTH CARE EDUCATION/TRAINING PROGRAM

## 2018-01-16 PROCEDURE — 82962 GLUCOSE BLOOD TEST: CPT

## 2018-01-16 PROCEDURE — 74011250637 HC RX REV CODE- 250/637: Performed by: STUDENT IN AN ORGANIZED HEALTH CARE EDUCATION/TRAINING PROGRAM

## 2018-01-16 PROCEDURE — 74011636637 HC RX REV CODE- 636/637: Performed by: FAMILY MEDICINE

## 2018-01-16 PROCEDURE — 36415 COLL VENOUS BLD VENIPUNCTURE: CPT | Performed by: STUDENT IN AN ORGANIZED HEALTH CARE EDUCATION/TRAINING PROGRAM

## 2018-01-16 RX ADMIN — CEPHALEXIN 500 MG: 250 CAPSULE ORAL at 08:18

## 2018-01-16 RX ADMIN — INSULIN GLARGINE 45 UNITS: 100 INJECTION, SOLUTION SUBCUTANEOUS at 08:17

## 2018-01-16 RX ADMIN — LISINOPRIL 20 MG: 20 TABLET ORAL at 08:18

## 2018-01-16 RX ADMIN — INSULIN LISPRO 4 UNITS: 100 INJECTION, SOLUTION INTRAVENOUS; SUBCUTANEOUS at 08:17

## 2018-01-16 RX ADMIN — SODIUM CHLORIDE, SODIUM LACTATE, POTASSIUM CHLORIDE, AND CALCIUM CHLORIDE 125 ML/HR: 600; 310; 30; 20 INJECTION, SOLUTION INTRAVENOUS at 05:24

## 2018-01-16 RX ADMIN — MORPHINE SULFATE 1 MG: 2 INJECTION, SOLUTION INTRAMUSCULAR; INTRAVENOUS at 03:59

## 2018-01-16 RX ADMIN — MORPHINE SULFATE 1 MG: 2 INJECTION, SOLUTION INTRAMUSCULAR; INTRAVENOUS at 08:17

## 2018-01-16 NOTE — PROGRESS NOTES
Bedside and Verbal shift change report given to Gosia Ontiveros RN (oncoming nurse) by Jaqueline Gomes RN (offgoing nurse). Report included the following information SBAR, Kardex, Intake/Output, Accordion and Recent Results.

## 2018-01-16 NOTE — DISCHARGE INSTRUCTIONS
HOME DISCHARGE INSTRUCTIONS    Emerita Leahy / [id number] : 1960    Admission date: 2018 Discharge date: 2018 10:13 AM     Please bring this form with you to show your care provider at your follow-up appointment. Primary care provider:  Estefania Canales MD    Discharging provider:  Lori Donnelly MD  - Family Medicine Resident  Linda Dunlap MD - Family Medicine Attending      You have been admitted to the hospital with the following diagnoses:    ACUTE DIAGNOSES:  · Pancreatitis    FOLLOW-UP CARE RECOMMENDATIONS:   Please adhere to the diet recommendations made in this document. Appointments  Follow-up Information     Follow up With Details Comments Contact Info    Estefania Canales MD In 3 days transitional care appointment for acute pancreatitis  [address]  [address]  [phone]               Follow-up tests needed:     Pending test results: At the time of your discharge the following test results are still pending: None. Please make sure you review these results with your outpatient follow-up provider(s). Specific symptoms to watch for: chest pain, shortness of breath, fever, chills, nausea, vomiting, diarrhea, change in mentation, falling, weakness, bleeding. DIET/what to eat:  Diabetic Diet    ACTIVITY:  Activity as tolerated    Wound care: Per PCP     Equipment needed:  None    What to do if new or unexpected symptoms occur? If you experience any of the above symptoms (or should other concerns or questions arise after discharge) please call your primary care physician. Return to the emergency room if you cannot get hold of your doctor. · It is very important that you keep your follow-up appointment(s). · Please bring discharge papers, medication list (and/or medication bottles) to your follow-up appointments for review by your outpatient provider(s).   · Please check the list of medications and be sure it includes every medication (even non-prescription medications) that your provider wants you to take. · It is important that you take the medication exactly as they are prescribed. · Keep your medication in the bottles provided by the pharmacist and keep a list of the medication names, dosages, and times to be taken in your wallet. · Do not take other medications without consulting your doctor. · If you have any questions about your medications or other instructions, please talk to your nurse or care provider before you leave the hospital.     Information obtained by:     I understand that if any problems occur once I am at home I am to contact my physician. These instructions were explained to me and I had the opportunity to ask questions. I understand and acknowledge receipt of the instructions indicated above.                                                                                                                                                Physician's or R.N.'s Signature                                                                  Date/Time                                                                                                                                              Patient or Representative Signature                                                          Date/Time

## 2018-01-16 NOTE — PROGRESS NOTES
Bedside and Verbal shift change report given to [address] (oncoming nurse) by Robbie Hernandez RN (offgoing nurse). Report included the following information SBAR, Kardex, Procedure Summary, Intake/Output, MAR, Accordion and Recent Results.

## 2018-01-16 NOTE — PROGRESS NOTES
I have reviewed discharge instructions with the patient and spouse. The patient and spouse verbalized understanding. Patient and spouse refused volunteer services to wheel patient out. Spouse wheeled patient.

## 2018-01-17 LAB
BACTERIA SPEC CULT: NORMAL
SERVICE CMNT-IMP: NORMAL

## 2018-01-17 NOTE — DISCHARGE SUMMARY
[address] [facility] [address] [place] [address]   Office [phone]  Fax [phone]       Discharge / Transfer / Off-Service Note     Name: Rachel Oden MRN: [id number]  Sex: Female   YOB: 1960  Age: 59 y.o. PCP: Hiram Zamudio MD     Date of admission: 1/11/2018  Date of discharge/transfer: 1/16/2018    Attending physician at admission: Dr. Haley Cole  Attending physician at discharge/transfer: Dr. Haley Cole  Resident physician at discharge/transfer: Mathew Osorio MD     Consultants during hospitalization  None     Admission diagnoses   Pancreatitis  Pancreatitis    Recommended follow-up after discharge  · PCP     History of Present Illness  Rachel Oden is a 59 y.o. female with known cerebral palsy, Diabetes requiring insulin, GERD, Crohn's, Hx of  polio who presented to the ER complaining of abdominal pain and nausea x 1 week. She described the pain as diffuse in her abdomen, and stated she hadn't been \"able to eat in 2 weeks\". She noted that she went to her PCP for these symptoms and labs were done, showing an elevated lipase. She was told that she had pancreatitis. Patient noted that she had her gallbladder removed a long time ago and denied any alcohol use recently. Patient denied any burning with urination. She also notes that her left heel wound has been treated by her PCP, and her  changes dressings daily, she denies any redness, swelling or discharge from wound.     In the ER, vital signs were remarkable for tachycardia of 105 bpm. Labs were remarkable for glucose of 491, 502 yesterday in PCP. Anion gap is 13. Lipase in PCP's office was 801, down to 386 in ED. Patient had leukocytosis of 14.4 on admission. Hospital course    Acute Pancreatitis. - No previous Hx of pancreatitis, and had past cholecystectomy. Lipase  801, down to 386 on admission.  - UDS with opiates. - IV hydration, IV Fluids and Analgesia.   - Advanced diet until pt tolerant of GI LIte diet  - Counseled on need for LIte and low fat diet on discharge.     Asymptomatic Uncomplicated UTI. UA with 2+ Bacteria and Nitrites. UCx Pan sensitive Klebsiela. Was on Cefepime for 2 days. - Urine culture with pan sensitive Klebsiella   - Treated with 2 days of cefepime and 1 day of feflex      Diabetic Foot Ulcer. Outpatient wound care by PCP and Home Health per patient, currently wound is dressed, patient repeatedly refused to allow for examination of wound and LLE to assess for infection, in setting of 2/4 SIRS criteria (, WBC 14.4). Has had multiple past hospitalizations for left foot wound infections and associated LLE cellulitis. Met SIRS criteria on admission, LA wnl, blood cultures with no growth in 2 days.   -Wound care and podiatry consulted and pt agreeable to wound care prior to discharge  - PCP follow-up for wound care  -Risk management consulted.      Hypertension - stable  - Home lisinopril 20mg      Diabetes Mellitus T2 Poorly controlled   - A1C 11.6 this admission.   - Per chart review, patient has previously uncontrolled diabetes (home Novolog mix 70-30, actos 30mg and metformin 1000mg BID). - Held home medications of Metformin and Actos. - Continued home Lantus 35U units qhs with SSI    Hypokalemia in the setting of K 3.2/2.9  - Resolved with PO KCL replacement      Cerebral Palsy  - Continued home Requip    Obesity: BMI of 32.3  - Counseled on need for weight loss and lifestyle modification. Physical exam at discharge:    Vitals Reviewed. General Oriented to person, place, and time and well-developed. Appears well-nourished, no distress. Not diaphoretic. HENT Head Normocephalic and atraumatic. Eyes Conjunctivae are normal, no discharge. No scleral icterus. Nose Nose normal, clear turbinates. Oral Oropharynx is clear and moist. No oropharyngeal exudate. Poor dentition. Neck No thyromegaly present. No cervical adenopathy.     Cardio Normal rate, regular rhythm. Exam reveals no gallop and no friction rub. No murmur heard. No chest wall tenderness. Pulmonary Effort normal and breath sounds normal. No respiratory distress. No wheezes, no rales. Abdominal Soft. Bowel sounds normal. No distension. No tenderness. Midline incisional hernia, well healed surgical scars.  Deferred. Extremities No edema of lower extremities. No tenderness. Distal pulses intact. LLE with wound dressing in place. Neurological Alert and oriented to person, place, and time. Dermatology Skin is warm and dry. No rash noted. No erythema or pallor. Psychiatric Affect and judgment normal.        Condition at discharge: Stable. Labs  Recent Labs      01/16/18   0625  01/15/18   0137  01/14/18   0115   WBC  14.4*  12.4*  9.7   HGB  12.4  12.1  11.5   HCT  36.7  35.3  33.2*   PLT  364  351  324     Recent Labs      01/16/18   0625  01/15/18   0137  01/14/18   0115   NA  136  135*  139   K  3.7  3.5  2.8*   CL  100  100  103   CO2  26  30  28   BUN  5*  4*  3*   CREA  0.47*  0.55  0.52*   GLU  233*  277*  235*   CA  8.7  8.3*  8.7     Recent Labs      01/16/18   0625  01/15/18   0137  01/14/18   0115   SGOT  22  15  19   ALT  37  34  37   AP  129*  139*  131*   TBILI  0.2  0.1*  0.2   TP  6.5  6.2*  6.1*   ALB  2.7*  2.6*  2.5*   GLOB  3.8  3.6  3.6     Recent Labs      01/16/18   0707  01/15/18   2058  01/15/18   1602  01/15/18   1107  01/15/18   0752   GLUCPOC  232*  374*  362*  254*  214*     Cultures  · Urine Cultures - Pan sensitive Klebsiella    Procedures / Diagnostic Studies  · None    Imaging                No procedure found.     Chronic diagnoses   Problem List as of 1/16/2018  Date Reviewed: 1/10/2018          Codes Class Noted - Resolved    Acute cystitis ICD-10-CM: N30.00  ICD-9-CM: 595.0  1/12/2018 - Present        * (Principal)Pancreatitis ICD-10-CM: K85.90  ICD-9-CM: 377.5  1/11/2018 - Present        Chronic ulcer of left foot ([facility] ICD-10-CM: Z27.065  ICD-9-CM: 707.15  11/4/2017 - Present        Cellulitis of left lower leg ICD-10-CM: L03.116  ICD-9-CM: 682.6  11/4/2017 - Present        Pseudomonas aeruginosa infection ICD-10-CM: A49.8  ICD-9-CM: 041.7  11/4/2017 - Present        Open wound of left foot ICD-10-CM: S91.302A  ICD-9-CM: 892.0  11/1/2017 - Present        Cellulitis ICD-10-CM: L03.90  ICD-9-CM: 682.9  10/31/2017 - Present        At risk for renal compromise ICD-10-CM: Z91.89  ICD-9-CM: V15.89  6/29/2016 - Present        Cellulitis of groin ICD-10-CM: L03.314  ICD-9-CM: 682.2  6/29/2016 - Present        Essential hypertension with goal blood pressure less than 140/90 ICD-10-CM: I10  ICD-9-CM: 401.9  6/29/2016 - Present        Post-polio syndrome ICD-10-CM: G14  ICD-9-CM: 138  6/29/2016 - Present        Type 2 diabetes mellitus with diabetic neuropathy ([facility] ICD-10-CM: E11.40  ICD-9-CM: 250.60, 357.2  6/29/2016 - Present        Hyperkalemia ICD-10-CM: E87.5  ICD-9-CM: 276.7  6/29/2016 - Present        Overactive bladder ICD-10-CM: N32.81  ICD-9-CM: 596.51  6/29/2016 - Present        Cellulitis of ankle ICD-10-CM: L03.119  ICD-9-CM: 682.6  3/3/2016 - Present        Arterial insufficiency with ischemic ulcer ([facility] ICD-10-CM: I77.1, L98.499  ICD-9-CM: 447.1, 707.9  3/3/2016 - Present        Urinary incontinence ICD-10-CM: R32  ICD-9-CM: 788.30  3/2/2016 - Present        Restless leg syndrome ICD-10-CM: G25.81  ICD-9-CM: 333.94  3/2/2016 - Present        Asymptomatic bacteriuria ICD-10-CM: R82.71  ICD-9-CM: 791.9  8/30/2015 - Present        Wound of left ankle ICD-10-CM: S91.002A  ICD-9-CM: 891.0  8/30/2015 - Present        Muscle spasm ICD-10-CM: Z29.880  ICD-9-CM: 728.85  6/19/2014 - Present    Overview Signed 6/19/2014 11:38 AM by Royal Joseph MD     chronic             PAD (peripheral artery disease) ([facility] ICD-10-CM: I73.9  ICD-9-CM: 443.9  6/19/2014 - Present        Financial difficulties ICD-10-CM: Z59.8  ICD-9-CM: V60.2  10/4/2012 - Present        Tobacco abuse ICD-10-CM: Z72.0  ICD-9-CM: 305.1  8/10/2012 - Present        Primary osteoarthritis of both hips ICD-10-CM: M16.0  ICD-9-CM: 715.15  8/9/2012 - Present        Edema extremities ICD-10-CM: R60.0  ICD-9-CM: 782.3  12/1/2011 - Present        Crohn disease ([facility] ICD-10-CM: K50.90  ICD-9-CM: 555.9  6/29/2011 - Present        Polio ICD-10-CM: A80.9  ICD-9-CM: 045.90  6/29/2011 - Present        Cerebral palsy ([facility] ICD-10-CM: G80.9  ICD-9-CM: 343.9  6/29/2011 - Present        Controlled type 2 DM with peripheral circulatory disorder (HCC) ICD-10-CM: E11.51  ICD-9-CM: 250.70, 443.81  6/29/2011 - Present        TIA (transient ischemic attack) ICD-10-CM: G45.9  ICD-9-CM: 435.9  6/29/2011 - Present    Overview Signed 6/29/2011  9:16 AM by Tracey Pace     X 3             Migraines ICD-10-CM: E03.294  ICD-9-CM: 346.90  6/29/2011 - Present        HTN (hypertension) ICD-10-CM: I10  ICD-9-CM: 401.9  6/29/2011 - Present        Elevated lipids ICD-10-CM: E78.5  ICD-9-CM: 272.4  6/29/2011 - Present        RESOLVED: Sepsis ([facility] ICD-10-CM: A41.9  ICD-9-CM: 038.9, 995.91  6/28/2016 - 11/30/2017        RESOLVED: SIRS (systemic inflammatory response syndrome) ([facility] ICD-10-CM: R65.10  ICD-9-CM: 995.90  8/29/2015 - 6/29/2016        RESOLVED: UTI (lower urinary tract infection) ICD-10-CM: N39.0  ICD-9-CM: 599.0  6/23/2014 - 6/29/2016              Discharge/Transfer Medications  Discharge Medication List as of 1/16/2018 10:22 AM      CONTINUE these medications which have NOT CHANGED    Details   oxyCODONE-acetaminophen (PERCOCET) 5-325 mg per tablet Take 1 Tab by mouth every four (4) hours as needed for Pain. Max Daily Amount: 6 Tabs., Print, Disp-45 Tab, R-0      lisinopril (PRINIVIL, ZESTRIL) 20 mg tablet TAKE ONE TABLET BY MOUTH ONCE DAILY, NormalPlease consider 90 day supplies to promote better adherenceDisp-30 Tab, R-5      pravastatin (PRAVACHOL) 40 mg tablet Take 1 Tab by mouth daily. , Normal, Disp-30 Tab, R-5      pioglitazone (ACTOS) 30 mg tablet Take 1 Tab by mouth daily. , Normal, Disp-30 Tab, R-5      rOPINIRole (REQUIP) 0.5 mg tablet Take 1 Tab by mouth nightly., Normal, Disp-30 Tab, R-5      insulin aspart protamine/insulin aspart (NOVOLOG MIX 70-30) 100 unit/mL (70-30) injection 10 Units by SubCUTAneous route Before breakfast and dinner., Historical Med      ondansetron (ZOFRAN ODT) 4 mg disintegrating tablet Take 1 Tab by mouth every eight (8) hours as needed for Nausea., Normal, Disp-20 Tab, R-1      diphenhydrAMINE (BENADRYL) 25 mg tablet Take 50 mg by mouth nightly as needed for Itching., Historical Med      fluconazole (DIFLUCAN) 150 mg tablet Take 1 Tab by mouth daily. Repeat in 3 days if needed., Normal, Disp-2 Tab, R-0      ibuprofen (MOTRIN) 800 mg tablet Take 1 Tab by mouth every eight (8) hours as needed for Pain., Normal, Disp-30 Tab, R-2      metFORMIN (GLUCOPHAGE) 1,000 mg tablet Take 1 Tab by mouth two (2) times daily (with meals). , Normal, Disp-60 Tab, R-5      docusate sodium (COLACE) 100 mg capsule Take 100 mg by mouth two (2) times daily as needed for Constipation. , Historical Med      oxybutynin (DITROPAN) 5 mg tablet TAKE ONE TABLET BY MOUTH 4 TIMES DAILY, Normal, Disp-120 Tab, R-0      fish oil-omega-3 fatty acids 340-1,000 mg capsule Take 1 Cap by mouth daily. , Historical Med      cholecalciferol (VITAMIN D3) 1,000 unit tablet Take 1,000 Units by mouth daily. , Historical Med      aspirin delayed-release (ASPIR-81) 81 mg tablet Take 1 Tab by mouth daily. , Normal, Disp-100 Tab, R-prn         STOP taking these medications       insulin glargine (LANTUS,BASAGLAR) 100 unit/mL (3 mL) inpn Comments:   Reason for Stopping:                Diet:  Clear liquid diet.     Activity:  As tolerated    Disposition: Home or Self Care    Discharge instructions to patient/family  Please seek medical attention for any new or worsening symptoms particularly fever, chest pain, shortness of breath, abdominal pain, nausea, vomiting    Follow up plans/appointments  Follow-up Information     Follow up With Details Comments Contact Info    Raheel Bullock MD Go on 1/18/2018 1:30 pm. transitional care appointment for acute pancreatitis  [address]  [address] [place] [address]  [phone]      [facility]  PT/OT, nursing for wound care and [address]  [address] [place] [address] [address]  [address], [place]  Family Medicine Resident       For Billing    Chief Complaint   Patient presents with   [facility] Abdominal Pain       Hospital Problems  Date Reviewed: 1/10/2018          Codes Class Noted POA    Acute cystitis ICD-10-CM: N30.00  ICD-9-CM: 595.0  1/12/2018 Yes        * (Principal)Pancreatitis ICD-10-CM: K85.90  ICD-9-CM: 270.8  1/11/2018 Yes        Arterial insufficiency with ischemic ulcer ([facility] ICD-10-CM: I77.1, L98.499  ICD-9-CM: 447.1, 707.9  3/3/2016 Yes        Wound of left ankle ICD-10-CM: H26.002C  ICD-9-CM: 891.0  8/30/2015 Yes

## 2018-01-18 ENCOUNTER — HOME CARE VISIT (OUTPATIENT)
Dept: HOME HEALTH SERVICES | Facility: HOME HEALTH | Age: 58
End: 2018-01-18
Payer: COMMERCIAL

## 2018-01-19 ENCOUNTER — HOME CARE VISIT (OUTPATIENT)
Dept: SCHEDULING | Facility: HOME HEALTH | Age: 58
End: 2018-01-19
Payer: COMMERCIAL

## 2018-01-19 PROCEDURE — G0151 HHCP-SERV OF PT,EA 15 MIN: HCPCS

## 2018-01-19 PROCEDURE — G0493 RN CARE EA 15 MIN HH/HOSPICE: HCPCS

## 2018-01-21 VITALS
DIASTOLIC BLOOD PRESSURE: 68 MMHG | RESPIRATION RATE: 20 BRPM | SYSTOLIC BLOOD PRESSURE: 122 MMHG | HEART RATE: 60 BPM | TEMPERATURE: 98 F | OXYGEN SATURATION: 98 %

## 2018-01-21 VITALS
SYSTOLIC BLOOD PRESSURE: 108 MMHG | OXYGEN SATURATION: 95 % | RESPIRATION RATE: 18 BRPM | HEART RATE: 100 BPM | TEMPERATURE: 98.3 F | DIASTOLIC BLOOD PRESSURE: 80 MMHG

## 2018-01-22 ENCOUNTER — HOME CARE VISIT (OUTPATIENT)
Dept: HOME HEALTH SERVICES | Facility: HOME HEALTH | Age: 58
End: 2018-01-22
Payer: COMMERCIAL

## 2018-01-23 ENCOUNTER — HOME CARE VISIT (OUTPATIENT)
Dept: SCHEDULING | Facility: HOME HEALTH | Age: 58
End: 2018-01-23
Payer: COMMERCIAL

## 2018-01-23 VITALS
DIASTOLIC BLOOD PRESSURE: 80 MMHG | TEMPERATURE: 97.9 F | RESPIRATION RATE: 20 BRPM | HEART RATE: 90 BPM | SYSTOLIC BLOOD PRESSURE: 136 MMHG | OXYGEN SATURATION: 96 %

## 2018-01-23 PROCEDURE — A6446 CONFORM BAND S W>=3" <5"/YD: HCPCS

## 2018-01-23 PROCEDURE — A4452 WATERPROOF TAPE: HCPCS

## 2018-01-23 PROCEDURE — G0300 HHS/HOSPICE OF LPN EA 15 MIN: HCPCS

## 2018-01-23 PROCEDURE — A6209 FOAM DRSG <=16 SQ IN W/O BDR: HCPCS

## 2018-01-25 ENCOUNTER — OFFICE VISIT (OUTPATIENT)
Dept: FAMILY MEDICINE CLINIC | Age: 58
End: 2018-01-25

## 2018-01-25 VITALS
WEIGHT: 139 LBS | DIASTOLIC BLOOD PRESSURE: 86 MMHG | TEMPERATURE: 98.1 F | HEART RATE: 101 BPM | BODY MASS INDEX: 29.99 KG/M2 | RESPIRATION RATE: 18 BRPM | OXYGEN SATURATION: 97 % | HEIGHT: 57 IN | SYSTOLIC BLOOD PRESSURE: 139 MMHG

## 2018-01-25 DIAGNOSIS — K85.90 ACUTE PANCREATITIS WITHOUT INFECTION OR NECROSIS, UNSPECIFIED PANCREATITIS TYPE: ICD-10-CM

## 2018-01-25 DIAGNOSIS — I77.1 ARTERIAL INSUFFICIENCY WITH ISCHEMIC ULCER (HCC): Primary | ICD-10-CM

## 2018-01-25 DIAGNOSIS — Z79.4 TYPE 2 DIABETES MELLITUS WITH DIABETIC NEUROPATHY, WITH LONG-TERM CURRENT USE OF INSULIN (HCC): ICD-10-CM

## 2018-01-25 DIAGNOSIS — E11.40 TYPE 2 DIABETES MELLITUS WITH DIABETIC NEUROPATHY, WITH LONG-TERM CURRENT USE OF INSULIN (HCC): ICD-10-CM

## 2018-01-25 DIAGNOSIS — L98.499 ARTERIAL INSUFFICIENCY WITH ISCHEMIC ULCER (HCC): Primary | ICD-10-CM

## 2018-01-25 RX ORDER — PROMETHAZINE HYDROCHLORIDE 25 MG/1
25 TABLET ORAL
Qty: 30 TAB | Refills: 0 | Status: SHIPPED | OUTPATIENT
Start: 2018-01-25 | End: 2018-02-11 | Stop reason: SDUPTHER

## 2018-01-25 NOTE — PROGRESS NOTES
Pt here with  to f/u from recent hospitalization for pancreatitis. C/o decreased appetite since being diagnosed.

## 2018-01-25 NOTE — MR AVS SNAPSHOT
[address] [address] 
[phone] Patient: Mora Lieberman MRN: [id number] OSM:[birth year]309 Visit Information Date & Time Provider Department Dept. Phone Encounter #  
 1/25/2018  2:00 PM Carmita Lyons MD [facility] [phone] [id number] Upcoming Health Maintenance Date Due  
 EYE EXAM RETINAL OR DILATED Q1 9/24/1968 COLONOSCOPY 9/24/1976 BREAST CANCER SCRN MAMMOGRAM 9/24/2008 MICROALBUMIN Q1 2/8/2017 PAP AKA CERVICAL CYTOLOGY 6/19/2017 HEMOGLOBIN A1C Q6M 7/11/2018 LIPID PANEL Q1 1/11/2019 FOOT EXAM Q1 1/12/2019 DTaP/Tdap/Td series (2 - Td) 8/29/2025 Allergies as of 1/25/2018  Review Complete On: 1/25/2018 By: Tee Reza MD  
  
 Severity Noted Reaction Type Reactions Potassium Medium 03/03/2016    Other (comments) Patient states she will refuse IV Potassium due to irritation-Patient will accept PO form. Anaprox [Naproxen Sodium]  10/05/2011    Other (comments) Blood pressure drops Tolerates ibuprofen Codeine  10/05/2011    Nausea and Vomiting Tolerates oxycodone/acetaminophen Mobic [Meloxicam]  10/05/2011    Other (comments) Blood pressure drops Tolerates ibuprofen Tylenol [Acetaminophen]  08/29/2015    Other (comments) \"Stomach hurts\", hx of Crohn's disease. Tolerates percocet. Current Immunizations  Reviewed on 10/3/2017 Name Date Influenza Vaccine 1/14/2014 Influenza Vaccine (Quad) PF 10/3/2017, 10/25/2016, 10/14/2015, 12/17/2014 Influenza Vaccine Split 10/4/2012, 10/5/2011 Pneumococcal Polysaccharide (PPSV-23) 1/14/2014 Tdap 8/29/2015  8:35 PM  
 ZZZ-RETIRED (DO NOT USE) Pneumococcal Vaccine (Unspecified Type) 10/5/2011 Not reviewed this visit You Were Diagnosed With   
  
 Codes Comments Arterial insufficiency with ischemic ulcer (HCC)    -  Primary ICD-10-CM: I77.1, L98.499 ICD-9-CM: 447.1, 707.9 Type 2 diabetes mellitus with diabetic neuropathy, with long-term current use of insulin (HCC)     ICD-10-CM: E11.40, Z79.4 ICD-9-CM: 250.60, 357.2, V58.67 Acute pancreatitis without infection or necrosis, unspecified pancreatitis type     ICD-10-CM: K85.90 ICD-9-CM: 648.5 Vitals BP Pulse Temp Resp Height(growth percentile) Weight(growth percentile) 139/86 (BP 1 Location: Right arm, BP Patient Position: Sitting) (!) 101 98.1 °F (36.7 °C) (Oral) 18 4' 9\" (1.448 m) 139 lb (63 kg) SpO2 BMI OB Status Smoking Status 97% 30.08 kg/m2 Hysterectomy Current Every Day Smoker Vitals History BMI and BSA Data Body Mass Index Body Surface Area 30.08 kg/m 2 1.59 m 2 Preferred Pharmacy Pharmacy Name Phone [address] [facility] [employer] [employer]. [employer]. [address] Your Updated Medication List  
  
   
This list is accurate as of: 1/25/18  2:11 PM.  Always use your most recent med list.  
  
  
  
  
 aspirin delayed-release 81 mg tablet Commonly known as:  IGYGP-52 Take 1 Tab by mouth daily. diphenhydrAMINE 25 mg tablet Commonly known as:  BENADRYL Take 50 mg by mouth nightly as needed for Itching. docusate sodium 100 mg capsule Commonly known as:  Eleonore Johny Take 100 mg by mouth two (2) times daily as needed for Constipation. fish oil-omega-3 fatty acids 340-1,000 mg capsule Take 1 Cap by mouth daily. fluconazole 150 mg tablet Commonly known as:  DIFLUCAN Take 1 Tab by mouth daily. Repeat in 3 days if needed. ibuprofen 800 mg tablet Commonly known as:  MOTRIN Take 1 Tab by mouth every eight (8) hours as needed for Pain. lisinopril 20 mg tablet Commonly known as:  PRINIVIL, ZESTRIL  
TAKE ONE TABLET BY MOUTH ONCE DAILY  
  
 metFORMIN 1,000 mg tablet Commonly known as:  GLUCOPHAGE Take 1 Tab by mouth two (2) times daily (with meals). NovoLOG Mix 70-30 100 unit/mL (70-30) injection Generic drug:  insulin aspart protamine/insulin aspart 10 Units by SubCUTAneous route Before breakfast and dinner. ondansetron 4 mg disintegrating tablet Commonly known as:  ZOFRAN ODT Take 1 Tab by mouth every eight (8) hours as needed for Nausea. oxybutynin 5 mg tablet Commonly known as:  DITROPAN  
TAKE ONE TABLET BY MOUTH 4 TIMES DAILY  
  
 oxyCODONE-acetaminophen 5-325 mg per tablet Commonly known as:  PERCOCET Take 1 Tab by mouth every four (4) hours as needed for Pain. Max Daily Amount: 6 Tabs. pioglitazone 30 mg tablet Commonly known as:  ACTOS Take 1 Tab by mouth daily. pravastatin 40 mg tablet Commonly known as:  PRAVACHOL Take 1 Tab by mouth daily. promethazine 25 mg tablet Commonly known as:  PHENERGAN Take 1 Tab by mouth every six (6) hours as needed for Nausea. rOPINIRole 0.5 mg tablet Commonly known as:  Catana Quiles Take 1 Tab by mouth nightly. VITAMIN D3 1,000 unit tablet Generic drug:  cholecalciferol Take 1,000 Units by mouth daily. Prescriptions Sent to Pharmacy Refills  
 promethazine (PHENERGAN) 25 mg tablet 0 Sig: Take 1 Tab by mouth every six (6) hours as needed for Nausea. Class: Normal  
 Pharmacy: [facility] [address] NICKOLAS ACOSTA [address] [place] [address] Ph #: [phone] Route: Oral  
  
To-Do List   
 01/26/2018 To Be Determined Appointment with Wale Alvarez RN at [address]! Dear Sosa Godoy: 
Thank you for requesting a CyberPatrol account. Our records indicate that you already have an active CyberPatrol account. You can access your account anytime at [email or web address] Did you know that you can access your hospital and ER discharge instructions at any time in CyberPatrol?   You can also review all of your test results from your hospital stay or ER visit. Additional Information If you have questions, please visit the Frequently Asked Questions section of the Overflow Cafe website at [email or web address] Remember, Overflow Cafe is NOT to be used for urgent needs. For medical emergencies, dial 911. Now available from your iPhone and Android! Please provide this summary of care documentation to your next provider. Your primary care clinician is listed as Janee Lyons. If you have any questions after today's visit, please call [phone].

## 2018-01-25 NOTE — PROGRESS NOTES
Pt here with  to f/u from recent hospitalization for pancreatitis. C/o decreased appetite since being diagnosed. Pt reports \" I am tired of it all\". Pt reports that she is tired of seeing doctors and home health. Pt is tired of having foot wound. Subjective: (As above and below)     Chief Complaint   Patient presents with   [facility] Follow Up     pancreatatis     she is a 59y.o. year old female who presents for evaluation. Reviewed PmHx, RxHx, FmHx, SocHx, AllgHx and updated in chart. Review of Systems - negative except as listed above    Objective:     Vitals:    01/25/18 1338   BP: 139/86   Pulse: (!) 101   Resp: 18   Temp: 98.1 °F (36.7 °C)   TempSrc: Oral   SpO2: 97%   Weight: 139 lb (63 kg)   Height: 4' 9\" (1.448 m)     Physical Examination: General appearance - alert, well appearing, and in no distress  Mental status - normal mood, behavior, speech, dress, motor activity, and thought processes  Mouth - mucous membranes moist, pharynx normal without lesions  Chest - clear to auscultation, no wheezes, rales or rhonchi, symmetric air entry  Heart - normal rate, regular rhythm, normal S1, S2, no murmurs, rubs, clicks or gallops  Musculoskeletal - chronic wound, currently dressed, receiving home wound care    Assessment/ Plan:   1. Arterial insufficiency with ischemic ulcer ([address]  -pt is unsure about driving to wound care, will work on transportation    2. Type 2 diabetes mellitus with diabetic neuropathy, with long-term current use of insulin ([address]  - pt reports that BS has been elevated since she has been ill  -pt reports that decreased appetite has made it more difficult to control    3. Acute pancreatitis without infection or necrosis, unspecified pancreatitis type  acutely resolved, some nausea remaining  -phenergan as needed     Follow-up Disposition: As needed  I have discussed the diagnosis with the patient and the intended plan as seen in the above orders.   The patient has received an after-visit summary and questions were answered concerning future plans.      Medication Side Effects and Warnings were discussed with patient: yes  Patient Labs were reviewed: yes  Patient Past Records were reviewed:  yes    Krystal Hastings M.D.

## 2018-01-26 ENCOUNTER — HOME CARE VISIT (OUTPATIENT)
Dept: SCHEDULING | Facility: HOME HEALTH | Age: 58
End: 2018-01-26
Payer: COMMERCIAL

## 2018-01-26 PROCEDURE — G0299 HHS/HOSPICE OF RN EA 15 MIN: HCPCS

## 2018-02-11 DIAGNOSIS — N39.41 URGE INCONTINENCE: ICD-10-CM

## 2018-02-12 RX ORDER — ROPINIROLE 0.5 MG/1
0.5 TABLET, FILM COATED ORAL
Qty: 30 TAB | Refills: 5 | Status: SHIPPED | OUTPATIENT
Start: 2018-02-12 | End: 2018-01-01 | Stop reason: SDUPTHER

## 2018-02-12 RX ORDER — OXYBUTYNIN CHLORIDE 5 MG/1
TABLET ORAL
Qty: 120 TAB | Refills: 5 | Status: SHIPPED | OUTPATIENT
Start: 2018-02-12 | End: 2018-01-01 | Stop reason: SDUPTHER

## 2018-02-12 RX ORDER — FLUCONAZOLE 150 MG/1
150 TABLET ORAL DAILY
Qty: 2 TAB | Refills: 0 | Status: SHIPPED | OUTPATIENT
Start: 2018-02-12 | End: 2018-01-01 | Stop reason: SDUPTHER

## 2018-02-12 RX ORDER — PROMETHAZINE HYDROCHLORIDE 25 MG/1
25 TABLET ORAL
Qty: 30 TAB | Refills: 0 | Status: SHIPPED | OUTPATIENT
Start: 2018-02-12 | End: 2018-03-29 | Stop reason: SDUPTHER

## 2018-02-12 RX ORDER — PIOGLITAZONEHYDROCHLORIDE 30 MG/1
30 TABLET ORAL DAILY
Qty: 30 TAB | Refills: 5 | Status: SHIPPED | OUTPATIENT
Start: 2018-02-12 | End: 2019-01-01 | Stop reason: SDUPTHER

## 2018-02-12 NOTE — TELEPHONE ENCOUNTER
From: Crissy Doll  To: Eric Wagoner MD  Sent: 2/11/2018 2:13 PM EST  Subject: Medication Renewal Request    Original authorizing provider: MD Martínez Weathers.  Oanh Garfield would like a refill of the following medications:  pioglitazone (ACTOS) 30 mg tablet [Janee Lyons MD]  rOPINIRole (REQUIP) 0.5 mg tablet [Janee Lyons MD]  fluconazole (DIFLUCAN) 150 mg tablet Victor Manuel Lyons MD]  promethazine (PHENERGAN) 25 mg tablet Victor Manuel Lyons MD]  oxybutynin (DITROPAN) 5 mg tablet Dutch Lyons MD]    Preferred pharmacy: [address] [place] [address] [place]:

## 2018-03-03 ENCOUNTER — TELEPHONE (OUTPATIENT)
Dept: FAMILY MEDICINE CLINIC | Age: 58
End: 2018-03-03

## 2018-03-03 DIAGNOSIS — S91.002S WOUND OF LEFT ANKLE, SEQUELA: ICD-10-CM

## 2018-03-05 RX ORDER — OXYCODONE AND ACETAMINOPHEN 5; 325 MG/1; MG/1
1 TABLET ORAL
Qty: 45 TAB | Refills: 0 | Status: SHIPPED | OUTPATIENT
Start: 2018-03-05 | End: 2018-03-29 | Stop reason: SDUPTHER

## 2018-03-05 NOTE — TELEPHONE ENCOUNTER
From: Rome Ward  To: Cooper Barr MD  Sent: 3/3/2018 9:36 AM EST  Subject: Medication Renewal Request    Original authorizing provider: MD Calvin Gregorio.  Matt Gonsalves would like a refill of the following medications:  oxyCODONE-acetaminophen (PERCOCET) 5-325 mg per tablet Karla Lyons MD]    Preferred pharmacy: [address] [place] [address] [place]:

## 2018-03-06 ENCOUNTER — DOCUMENTATION ONLY (OUTPATIENT)
Dept: FAMILY MEDICINE CLINIC | Age: 58
End: 2018-03-06

## 2018-03-06 NOTE — PROGRESS NOTES
[employer] form filled out and placed at  for pickup. Copy of form placed in scan folder. Notified pt forms are ready for pickup.

## 2018-03-27 RX ORDER — AMITRIPTYLINE HYDROCHLORIDE 50 MG/1
50 TABLET, FILM COATED ORAL
Qty: 30 TAB | Refills: 2 | Status: SHIPPED | OUTPATIENT
Start: 2018-03-27 | End: 2018-01-01 | Stop reason: SDUPTHER

## 2018-03-29 RX ORDER — PROMETHAZINE HYDROCHLORIDE 25 MG/1
TABLET ORAL
Qty: 30 TAB | Refills: 0 | Status: SHIPPED | OUTPATIENT
Start: 2018-03-29 | End: 2018-03-29 | Stop reason: SDUPTHER

## 2018-04-05 RX ORDER — CIPROFLOXACIN 500 MG/1
500 TABLET ORAL 2 TIMES DAILY
Qty: 20 TAB | Refills: 0 | Status: SHIPPED | OUTPATIENT
Start: 2018-04-05 | End: 2018-01-01

## 2018-04-22 NOTE — TELEPHONE ENCOUNTER
From: Fátima Quevedo  To: Thomas Villeda MD  Sent: 1/22/2017 2:14 PM EST  Subject: Medication Renewal Request    Original authorizing provider: MD Fátima Bean would like a refill of the following medications:  oxyCODONE-acetaminophen (PERCOCET) 5-325 mg per tablet Kaylen Lyons MD]    Preferred pharmacy: [address]: [facility] - [facility]    Report of Cardiology Consultation    [place] Client Patient Status:  Inpatient    3/1/1927 MRN [id number]   Location [facility] 2W/SW Attending Joy Dunn MD   Hosp Day # 0 PCP Lakesha Fraser MD     Date of Admis avm  No date: HYSTERECTOMY  No date: REMOVAL GALLBLADDER  No date: TOTAL HIP REPLACEMENT      Comment: Bilateral  No date: WRIST FRACTURE SURGERY Right    Family History  No family history on file. Social History  Patient Guardian Status:  Not on file. HYDROmorphone HCl (DILAUDID) 1 MG/ML injection 0.8 mg 0.8 mg Intravenous Q2H PRN   Or      HYDROmorphone HCl (DILAUDID) 1 MG/ML injection 1.2 mg 1.2 mg Intravenous Q2H PRN   Atropine Sulfate 0.1 MG/ML injection 0.5 mg 0.5 mg Intravenous PRN   nitroGLYCER breakfast.     Pantoprazole Sodium (PROTONIX) 40 MG Oral Tab EC Take 1 tablet by mouth daily. Pravastatin Sodium (PRAVACHOL) 20 MG Oral Tab Take 20 mg by mouth nightly. Cholecalciferol (VITAMIN D) 1000 UNITS Oral Cap Take 1,000 Units by mouth daily. 04/21/2018   CO2 31 04/21/2018    (H) 04/21/2018   CA 9.4 04/21/2018   ALB 3.4 (L) 03/25/2018   ALKPHO 86 03/25/2018   TP 7.4 03/25/2018   AST 24 03/25/2018   ALT 9 (L) 03/25/2018   .2 (H) 04/22/2018   INR 1.2 04/22/2018   PTP 15.1 (H) 04/22/

## 2018-04-30 NOTE — TELEPHONE ENCOUNTER
From: Shelly Riley  To: Patti Pacheco MD  Sent: 4/30/2018 5:10 PM EDT  Subject: Medication Renewal Request    Original authorizing provider: MD Oxana Amaral.  Vanna Benjamin would like a refill of the following medications:  oxyCODONE-acetaminophen (PERCOCET) 5-325 mg per tablet Marck Lyons MD]    Preferred pharmacy: [address] [place] [address] [place]:

## 2018-05-31 NOTE — PROGRESS NOTES
Chief Complaint   Patient presents with    Medication Refill     Pt seen in the office today with spouse present for medication refill    Lissa Tomasmann RTC today to follow up on chronic pain diagnosis. We discussed her arthralgias and myalgias that is affecting her left foot. Significant changes since last visit: none. She is  able to do her normal daily activities. She reports the following adverse side effects: none. Least pain over the last week has been 3/10. Worst pain over the last week has been 8/10. Opioid Risk Tool Reviewed: YES  Aberrant behaviors: None. Urine Drug Screen: due - order placed. Controlled substance agreement on file: YES.  reviewed:yes  Pill count is consistent with her prescription: n/a  Concomitant use of a benzodiazepine: no    Subjective: (As above and below)     Chief Complaint   Patient presents with    Medication Refill     she is a 59y.o. year old female who presents for evaluation. Reviewed PmHx, RxHx, FmHx, SocHx, AllgHx and updated in chart. Review of Systems - negative except as listed above    Objective:     Vitals:    05/31/18 1345   BP: 133/85   Pulse: 92   Resp: 18   Temp: 98.5 °F (36.9 °C)   TempSrc: Oral   SpO2: 99%   Weight: 128 lb (58.1 kg)   Height: 4' 9\" (1.448 m)     Physical Examination: General appearance - alert, well appearing, and in no distress  Mouth - mucous membranes moist, pharynx normal without lesions  Chest - clear to auscultation, no wheezes, rales or rhonchi, symmetric air entry  Heart - normal rate, regular rhythm, normal S1, S2, no murmurs, rubs, clicks or gallops  Musculoskeletal - in wheelchair, able to minimally ambulate    Assessment/ Plan:   1. Medication refill  This is a chronic problem that is not changed. Per review of available records and patients , there are not sign of overuse, misuse, diversion, or concerning side effects.  Today we reviewed: the risk of overdose, addiction, and dependency proper storage and disposal of medications the goals of treatment (improve functionality, quality of life, and pain) alternative treatment options including non-narcotic modalities the risks and benefits of continuing with a narcotic based pain regimen  The following changes were made to the patients current treatment plan: nothing, medications refilled. - AMB POC ALERE ICUP DX DRUG SCREEN 10    2. Controlled type 2 DM with peripheral circulatory disorder (HCC)  - AMB POC URINE, MICROALBUMIN, SEMIQUANTITATIVE  - FUNDUS PHOTOGRAPHY     Follow-up Disposition: 3 months  I have discussed the diagnosis with the patient and the intended plan as seen in the above orders. The patient has received an after-visit summary and questions were answered concerning future plans.      Medication Side Effects and Warnings were discussed with patient: yes  Patient Labs were reviewed: yes  Patient Past Records were reviewed:  yes    Saul Garcia M.D.

## 2018-05-31 NOTE — MR AVS SNAPSHOT
[address] [address] 
[phone] Patient: Wesley Stevens MRN: [id number] KRK:4/62/9265 Visit Information Date & Time Provider Department Dept. Phone Encounter #  
 5/31/2018  1:30 PM Nas Lal MD [address] [facility] [phone] [id number] Upcoming Health Maintenance Date Due  
 EYE EXAM RETINAL OR DILATED Q1 9/24/1968 COLONOSCOPY 9/24/1976 BREAST CANCER SCRN MAMMOGRAM 9/24/2008 MICROALBUMIN Q1 2/8/2017 PAP AKA CERVICAL CYTOLOGY 6/19/2017 HEMOGLOBIN A1C Q6M 7/11/2018 Influenza Age 5 to Adult 8/1/2018 LIPID PANEL Q1 1/11/2019 FOOT EXAM Q1 1/12/2019 DTaP/Tdap/Td series (2 - Td) 8/29/2025 Allergies as of 5/31/2018  Review Complete On: 5/31/2018 By: Nas Lal MD  
  
 Severity Noted Reaction Type Reactions Potassium Medium 03/03/2016    Other (comments) Patient states she will refuse IV Potassium due to irritation-Patient will accept PO form. Anaprox [Naproxen Sodium]  10/05/2011    Other (comments) Blood pressure drops Tolerates ibuprofen Codeine  10/05/2011    Nausea and Vomiting Tolerates oxycodone/acetaminophen Mobic [Meloxicam]  10/05/2011    Other (comments) Blood pressure drops Tolerates ibuprofen Tylenol [Acetaminophen]  08/29/2015    Other (comments) \"Stomach hurts\", hx of Crohn's disease. Tolerates percocet. Current Immunizations  Reviewed on 10/3/2017 Name Date Influenza Vaccine 1/14/2014 Influenza Vaccine (Quad) PF 10/3/2017, 10/25/2016, 10/14/2015, 12/17/2014 Influenza Vaccine Split 10/4/2012, 10/5/2011 Pneumococcal Polysaccharide (PPSV-23) 1/14/2014 Tdap 8/29/2015  8:35 PM  
 ZZZ-RETIRED (DO NOT USE) Pneumococcal Vaccine (Unspecified Type) 10/5/2011 Not reviewed this visit You Were Diagnosed With   
  
 Codes Comments Medication refill    -  Primary ICD-10-CM: Z76.0 ICD-9-CM: V68.1 Controlled type 2 DM with peripheral circulatory disorder (HCC)     ICD-10-CM: E11.51 
ICD-9-CM: 250.70, 443.81 Wound of left ankle, sequela     ICD-10-CM: S91.002S ICD-9-CM: 906.1 Vitals BP Pulse Temp Resp Height(growth percentile) Weight(growth percentile) 133/85 (BP 1 Location: Right arm, BP Patient Position: Sitting) 92 98.5 °F (36.9 °C) (Oral) 18 4' 9\" (1.448 m) 128 lb (58.1 kg) SpO2 BMI OB Status Smoking Status 99% 27.7 kg/m2 Hysterectomy Current Every Day Smoker Vitals History BMI and BSA Data Body Mass Index Body Surface Area  
 27.7 kg/m 2 1.53 m 2 Preferred Pharmacy Pharmacy Name Phone [address] [facility] [address] Your Updated Medication List  
  
   
This list is accurate as of 5/31/18  2:06 PM.  Always use your most recent med list.  
  
  
  
  
 amitriptyline 50 mg tablet Commonly known as:  ELAVIL Take 1 Tab by mouth nightly. aspirin delayed-release 81 mg tablet Commonly known as:  LTRTE-52 Take 1 Tab by mouth daily. diphenhydrAMINE 25 mg tablet Commonly known as:  BENADRYL Take 50 mg by mouth nightly as needed for Itching. docusate sodium 100 mg capsule Commonly known as:  Mahala Multani Take 100 mg by mouth two (2) times daily as needed for Constipation. fish oil-omega-3 fatty acids 340-1,000 mg capsule Take 1 Cap by mouth daily. fluconazole 150 mg tablet Commonly known as:  DIFLUCAN Take 1 Tab by mouth daily. Repeat in 3 days if needed. ibuprofen 800 mg tablet Commonly known as:  MOTRIN Take 1 Tab by mouth every eight (8) hours as needed for Pain. lisinopril 20 mg tablet Commonly known as:  PRINIVIL, ZESTRIL  
TAKE ONE TABLET BY MOUTH ONCE DAILY  
  
 metFORMIN 1,000 mg tablet Commonly known as:  GLUCOPHAGE Take 1 Tab by mouth two (2) times daily (with meals). NovoLOG Mix 70-30 U-100 Insuln 100 unit/mL (70-30) injection Generic drug:  insulin aspart protamine/insulin aspart 10 Units by SubCUTAneous route Before breakfast and dinner. ondansetron 4 mg disintegrating tablet Commonly known as:  ZOFRAN ODT Take 1 Tab by mouth every eight (8) hours as needed for Nausea. oxybutynin 5 mg tablet Commonly known as:  DITROPAN  
TAKE ONE TABLET BY MOUTH 4 TIMES DAILY  
  
 oxyCODONE-acetaminophen 5-325 mg per tablet Commonly known as:  PERCOCET Take 1 Tab by mouth every four (4) hours as needed for Pain. Max Daily Amount: 6 Tabs. MUST LAST 30 DAYS  
  
 pioglitazone 30 mg tablet Commonly known as:  ACTOS Take 1 Tab by mouth daily. pravastatin 40 mg tablet Commonly known as:  PRAVACHOL Take 1 Tab by mouth daily. promethazine 25 mg tablet Commonly known as:  PHENERGAN Take 1 Tab by mouth every six (6) hours as needed for Nausea. rOPINIRole 0.5 mg tablet Commonly known as:  [place] Take 1 Tab by mouth nightly. VITAMIN D3 1,000 unit tablet Generic drug:  cholecalciferol Take 1,000 Units by mouth daily. Prescriptions Printed Refills  
 oxyCODONE-acetaminophen (PERCOCET) 5-325 mg per tablet 0 Sig: Take 1 Tab by mouth every four (4) hours as needed for Pain. Max Daily Amount: 6 Tabs. MUST LAST 30 DAYS Class: Print Route: Oral  
  
We Performed the Following AMB POC ALERE ICUP DX DRUG SCREEN 10 [id number] CPT(R)] AMB POC URINE, MICROALBUMIN, SEMIQUANTITATIVE [10120 CPT(R)] FUNDUS PHOTOGRAPHY [id number] CPT(R)] [facility]! Dear Costa: 
Thank you for requesting a FlightCaster account. Our records indicate that you already have an active FlightCaster account. You can access your account anytime at [email or web address] Did you know that you can access your hospital and ER discharge instructions at any time in FlightCaster?   You can also review all of your test results from your hospital stay or ER visit. Additional Information If you have questions, please visit the Frequently Asked Questions section of the LawyerPaid website at [email or web address] Remember, LawyerPaid is NOT to be used for urgent needs. For medical emergencies, dial 911. Now available from your iPhone and Android! Please provide this summary of care documentation to your next provider. Your primary care clinician is listed as Janee Lyons. If you have any questions after today's visit, please call [phone].

## 2018-07-19 NOTE — Clinical Note
Are there any social workers or case workers that could help Mrs. Sudheer Yap navigate her options re: payment ? She reports that she has applied for the care card every year, never hears back regarding an answer, has been denied medicaid.

## 2018-07-19 NOTE — MR AVS SNAPSHOT
[address] [address] 
[phone] Patient: Deshawn Ramírez MRN: [id number] CDZ:[birth year] Visit Information Date & Time Provider Department Dept. Phone Encounter #  
 7/19/2018  3:15 PM Matteo Hays MD [address] [facility] [phone] [id number] Follow-up Instructions Routing History Follow-up and Disposition History Upcoming Health Maintenance Date Due  
 EYE EXAM RETINAL OR DILATED Q1 9/24/1968 COLONOSCOPY 9/24/1976 BREAST CANCER SCRN MAMMOGRAM 9/24/2008 PAP AKA CERVICAL CYTOLOGY 6/19/2017 HEMOGLOBIN A1C Q6M 7/11/2018 Influenza Age 5 to Adult 8/1/2018 LIPID PANEL Q1 1/11/2019 FOOT EXAM Q1 1/12/2019 MICROALBUMIN Q1 5/31/2019 DTaP/Tdap/Td series (2 - Td) 8/29/2025 Allergies as of 7/19/2018  Review Complete On: 7/19/2018 By: Matteo Hays MD  
  
 Severity Noted Reaction Type Reactions Potassium Medium 03/03/2016    Other (comments) Patient states she will refuse IV Potassium due to irritation-Patient will accept PO form. Anaprox [Naproxen Sodium]  10/05/2011    Other (comments) Blood pressure drops Tolerates ibuprofen Codeine  10/05/2011    Nausea and Vomiting Tolerates oxycodone/acetaminophen Mobic [Meloxicam]  10/05/2011    Other (comments) Blood pressure drops Tolerates ibuprofen Tylenol [Acetaminophen]  08/29/2015    Other (comments) \"Stomach hurts\", hx of Crohn's disease. Tolerates percocet. Current Immunizations  Reviewed on 10/3/2017 Name Date Influenza Vaccine 1/14/2014 Influenza Vaccine (Quad) PF 10/3/2017, 10/25/2016, 10/14/2015, 12/17/2014 Influenza Vaccine Split 10/4/2012, 10/5/2011 Pneumococcal Polysaccharide (PPSV-23) 1/14/2014 Tdap 8/29/2015  8:35 PM  
 ZZZ-RETIRED (DO NOT USE) Pneumococcal Vaccine (Unspecified Type) 10/5/2011 Not reviewed this visit You Were Diagnosed With   
  
 Codes Comments Financial difficulties    -  Primary ICD-10-CM: Z59.8 ICD-9-CM: V60.2 Gastroesophageal reflux disease without esophagitis     ICD-10-CM: K21.9 ICD-9-CM: 530.81 Type 2 diabetes mellitus with diabetic neuropathy, with long-term current use of insulin (HCC)     ICD-10-CM: E11.40, Z79.4 ICD-9-CM: 250.60, 357.2, V58.67 Vitals BP Pulse Temp Resp Height(growth percentile) Weight(growth percentile) 98/66 (BP 1 Location: Right arm, BP Patient Position: Sitting) 79 98.1 °F (36.7 °C) (Oral) 18 4' 9\" (1.448 m) 124 lb (56.2 kg) SpO2 BMI OB Status Smoking Status 97% 26.83 kg/m2 Hysterectomy Current Every Day Smoker Vitals History BMI and BSA Data Body Mass Index Body Surface Area  
 26.83 kg/m 2 1.5 m 2 Preferred Pharmacy Pharmacy Name Phone [address] [facility] [place] [address] [address] Your Updated Medication List  
  
   
This list is accurate as of 7/19/18  4:03 PM.  Always use your most recent med list.  
  
  
  
  
 amitriptyline 50 mg tablet Commonly known as:  ELAVIL Take 1 Tab by mouth nightly. aspirin delayed-release 81 mg tablet Commonly known as:  QZRPH-60 Take 1 Tab by mouth daily. cimetidine 200 mg tablet Commonly known as:  TAGAMET Take 1 Tab by mouth three (3) times daily. diphenhydrAMINE 25 mg tablet Commonly known as:  BENADRYL Take 50 mg by mouth nightly as needed for Itching. docusate sodium 100 mg capsule Commonly known as:  Gevena Living Take 100 mg by mouth two (2) times daily as needed for Constipation. fish oil-omega-3 fatty acids 340-1,000 mg capsule Take 1 Cap by mouth daily. fluconazole 150 mg tablet Commonly known as:  DIFLUCAN Take 1 Tab by mouth daily. Repeat in 3 days if needed. ibuprofen 800 mg tablet Commonly known as:  MOTRIN Take 1 Tab by mouth every eight (8) hours as needed for Pain. lisinopril 20 mg tablet Commonly known as:  PRINIVIL, ZESTRIL  
TAKE ONE TABLET BY MOUTH ONCE DAILY  
  
 metFORMIN 1,000 mg tablet Commonly known as:  GLUCOPHAGE Take 1 Tab by mouth two (2) times daily (with meals). NovoLOG Mix 70-30 U-100 Insuln 100 unit/mL (70-30) injection Generic drug:  insulin aspart protamine/insulin aspart 10 Units by SubCUTAneous route Before breakfast and dinner. ondansetron 4 mg disintegrating tablet Commonly known as:  ZOFRAN ODT Take 1 Tab by mouth every eight (8) hours as needed for Nausea. oxybutynin 5 mg tablet Commonly known as:  DITROPAN  
TAKE ONE TABLET BY MOUTH 4 TIMES DAILY  
  
 oxyCODONE-acetaminophen 5-325 mg per tablet Commonly known as:  PERCOCET Take 1 Tab by mouth every four (4) hours as needed for Pain. Max Daily Amount: 6 Tabs. MUST LAST 30 DAYS  
  
 pantoprazole 40 mg tablet Commonly known as:  PROTONIX Take 1 Tab by mouth daily. pioglitazone 30 mg tablet Commonly known as:  ACTOS Take 1 Tab by mouth daily. pravastatin 40 mg tablet Commonly known as:  PRAVACHOL Take 1 Tab by mouth daily. promethazine 25 mg tablet Commonly known as:  PHENERGAN Take 1 Tab by mouth every six (6) hours as needed for Nausea. rOPINIRole 0.5 mg tablet Commonly known as:  Shon Mould Take 1 Tab by mouth nightly. VITAMIN D3 1,000 unit tablet Generic drug:  cholecalciferol Take 1,000 Units by mouth daily. Prescriptions Sent to Pharmacy Refills  
 pantoprazole (PROTONIX) 40 mg tablet 2 Sig: Take 1 Tab by mouth daily. Class: Normal  
 Pharmacy: [facility] ACOSTA [address] [place] [address] Ph #: [phone] Route: Oral  
 cimetidine (TAGAMET) 200 mg tablet 01 Sig: Take 1 Tab by mouth three (3) times daily. Class: Normal  
 [facility] [address] [place] [address] Ph #: [phone]  Route: Oral  
  
 We Performed the Following CBC WITH AUTOMATED DIFF [06883 CPT(R)] HEMOGLOBIN A1C WITH EAG [60104 CPT(R)] METABOLIC PANEL, COMPREHENSIVE [79049 CPT(R)] Introducing [facility] SERVICES! Dear Patricio Spann: 
Thank you for requesting a [employer] account. Our records indicate that you already have an active [employer] account. You can access your account anytime at [email or web address]. Caps[employer] Tech/[employer] Did you know that you can access your hospital and ER discharge instructions at any time in [employer]? You can also review all of your test results from your hospital stay or ER visit. Additional Information If you have questions, please visit the Frequently Asked Questions section of the [employer] website at [email or web address] Remember, [employer] is NOT to be used for urgent needs. For medical emergencies, dial 911. Now available from your iPhone and Android! Please provide this summary of care documentation to your next provider. Your primary care clinician is listed as Janee Lyons. If you have any questions after today's visit, please call [phone].

## 2018-07-19 NOTE — PROGRESS NOTES
Pt here with  c/o frequent HA's x 2-3 weeks. Also c/o indigestion and decreased appetite x 2 weeks. Pt states she has a burning sensation at times. Reports having a hiatal hernia. Pt reports that she had two episodes of rectal bleeding this week. Pt knows that she needs to see Dr. Corey Acharya, does not want to go at this time due to other stressors. Pt reports that she is being threatened by a  to be taken to court re: her bill with Gianna Allen. Subjective: (As above and below)     Chief Complaint   Patient presents with    Headache    Hiatal Hernia     she is a 59y.o. year old female who presents for evaluation. Reviewed PmHx, RxHx, FmHx, SocHx, AllgHx and updated in chart. Review of Systems - negative except as listed above    Objective:     Vitals:    07/19/18 1520   BP: 98/66   Pulse: 79   Resp: 18   Temp: 98.1 °F (36.7 °C)   TempSrc: Oral   SpO2: 97%   Weight: 124 lb (56.2 kg)   Height: 4' 9\" (1.448 m)     Physical Examination: General appearance - alert, well appearing, and in no distress  Mental status - normal mood, behavior, speech, dress, motor activity, and thought processes  Mouth - mucous membranes moist, pharynx normal without lesions  Chest - clear to auscultation, no wheezes, rales or rhonchi, symmetric air entry  Heart - normal rate, regular rhythm, normal S1, S2, no murmurs, rubs, clicks or gallops  Musculoskeletal - in wheelchair, chronic nonhealing ulcer on left foot    Assessment/ Plan:   1. Financial difficulties  -discussed financial concerns at length, advised pt to apply for disability     2. Gastroesophageal reflux disease without esophagitis  -start on protonix as written    3.  Type 2 diabetes mellitus with diabetic neuropathy, with long-term current use of insulin ([facility])  - METABOLIC PANEL, COMPREHENSIVE  - CBC WITH AUTOMATED DIFF  - HEMOGLOBIN A1C WITH EAG     Follow-up Disposition: Not on File  I have discussed the diagnosis with the patient and the intended plan as seen in the above orders. The patient has received an after-visit summary and questions were answered concerning future plans.      Medication Side Effects and Warnings were discussed with patient: yes  Patient Labs were reviewed: yes  Patient Past Records were reviewed:  yes    María Gonsales M.D.

## 2018-07-20 NOTE — TELEPHONE ENCOUNTER
Pt referred to clinician re: no health insurance. 13:47 pm, Outbound call to patient's cellular phone, voice message left w/contact information. MARK Mosley      13:46 pm, Outbound call to patient's home, no answer. MARK Mosley    Plan:    -F/U w/patient and provide telephone # to financial assistance dept.     MARK Mosley

## 2018-07-21 NOTE — PROGRESS NOTES
Potassium is slightly low, please take potassium supplement as written  New onset anemia, please increase iron rich foods. Diabetes remains very poorly controlled, have you been taking your insulin? A message has been sent in [facility] and the lab work released to the patient.

## 2018-07-24 NOTE — TELEPHONE ENCOUNTER
11:44 am,  Outbound call to patient, identifiers ( & address) verified per HIPAA policy. Identified self, role and nature of the call, pt acknowledged understanding. Writer informed pt unable to find out status of Care Card application, instructed pt to continue to call to find out eligibility. Pt acknowledged understanding. MARK Chadwick      11:26 am, Outbound call to [facility] re: status of Care Card application. Identified self, role and nature of the call. Spoke to [employer]". Inquired about the status of pending Care Card application. Per Vikas Rodriges not doing applications for the Care Card. We're only doing applications for state eligibility. The patient has to call and get their own status\". Acknowledged understanding.       MARK Chadwick

## 2018-07-24 NOTE — TELEPHONE ENCOUNTER
Outbound F/U call to patient. Identified self, role and nature of the call. Pt acknowledged understanding. Pt identifiers ( & address) verified per HIPAA policy. Pt referred to clinician re: no health insurance. Inquired if patient has filled out an application for the Care Card \"yes, I have. Client ID # [id number]. I still don't know the status of my Care Card application\". Pt voiced she's never worked due to her disability; and isn't able to receive disability (SSDI) due to 's income. He makes $100 more than the allowed amount, for me to get disability. Spouse has Medicare/supplement, and she's not able to get on his Medicare plan. Provided pt w/the telephone # to [employer] [phone], to check on the status of application. Pt verbalized \"I called this number before, and was instructed not to fill out anymore applications b/c I have too many applications pending\". Writer voiced will contact [employer] to see if able to find out the status of pending application. Plan:    -call [employer] [facility]; and return call to patient w/information.       MARK Van

## 2018-08-02 NOTE — TELEPHONE ENCOUNTER
From: Morena Arroyo  To: Chava Greene MD  Sent: 8/2/2018 2:05 PM EDT  Subject: Medication Renewal Request    Original authorizing provider: MD Arely Matthews.  Aubree Stephenson would like a refill of the following medications:  oxyCODONE-acetaminophen (PERCOCET) 5-325 mg per tablet Oni Lyons MD]    Preferred pharmacy: [address] [place] [address] [place]:

## 2018-09-03 NOTE — TELEPHONE ENCOUNTER
From: Fátima Quevedo  To: Thomas Villeda MD  Sent: 9/1/2018 8:30 PM EDT  Subject: Medication Renewal Request    Original authorizing provider: MD Mireya Bean.  Clyde Olguin would like a refill of the following medications:  oxyCODONE-acetaminophen (PERCOCET) 5-325 mg per tablet Kaylen Lyons MD]    Preferred pharmacy: [address] [place] [address] [place]:

## 2018-09-26 NOTE — Clinical Note
Are there any updates on this patients eligibility? She continues to be very confused and frustrated regarding the status of her application.

## 2018-09-26 NOTE — MR AVS SNAPSHOT
[address] [address] 
[phone] Patient: Pablito Alexandre MRN: [id number] NZO:[birth year] Visit Information Date & Time Provider Department Dept. Phone Encounter #  
 9/26/2018  1:20 PM Mukesh Hernandez MD [address] [facility] [phone] [id number] Follow-up Instructions Routing History Upcoming Health Maintenance Date Due  
 EYE EXAM RETINAL OR DILATED Q1 9/24/1968 COLONOSCOPY 9/24/1976 Shingrix Vaccine Age 50> (1 of 2) 9/24/2008 BREAST CANCER SCRN MAMMOGRAM 9/24/2008 PAP AKA CERVICAL CYTOLOGY 6/19/2017 Influenza Age 5 to Adult 8/1/2018 LIPID PANEL Q1 1/11/2019 FOOT EXAM Q1 1/12/2019 HEMOGLOBIN A1C Q6M 1/19/2019 MICROALBUMIN Q1 5/31/2019 DTaP/Tdap/Td series (2 - Td) 8/29/2025 Allergies as of 9/26/2018  Review Complete On: 9/26/2018 By: Mukesh Hernandez MD  
  
 Severity Noted Reaction Type Reactions Potassium Medium 03/03/2016    Other (comments) Patient states she will refuse IV Potassium due to irritation-Patient will accept PO form. Anaprox [Naproxen Sodium]  10/05/2011    Other (comments) Blood pressure drops Tolerates ibuprofen Codeine  10/05/2011    Nausea and Vomiting Tolerates oxycodone/acetaminophen Mobic [Meloxicam]  10/05/2011    Other (comments) Blood pressure drops Tolerates ibuprofen Tylenol [Acetaminophen]  08/29/2015    Other (comments) \"Stomach hurts\", hx of Crohn's disease. Tolerates percocet. Current Immunizations  Reviewed on 10/3/2017 Name Date Influenza Vaccine 1/14/2014 Influenza Vaccine (Quad) PF  Incomplete, 10/3/2017, 10/25/2016, 10/14/2015, 12/17/2014 Influenza Vaccine Split 10/4/2012, 10/5/2011 Pneumococcal Polysaccharide (PPSV-23) 1/14/2014 Tdap 8/29/2015  8:35 PM  
 ZZZ-RETIRED (DO NOT USE) Pneumococcal Vaccine (Unspecified Type) 10/5/2011 Not reviewed this visit You Were Diagnosed With   
  
 Codes Comments Abdominal pain, unspecified abdominal location    -  Primary ICD-10-CM: R10.9 ICD-9-CM: 789.00 Encounter for immunization     ICD-10-CM: X04 ICD-9-CM: V03.89 Vitals BP Pulse Temp Resp Height(growth percentile) Weight(growth percentile) 132/88 (BP 1 Location: Left arm, BP Patient Position: Sitting) 84 97.7 °F (36.5 °C) (Oral) 16 4' 9\" (1.448 m) 132 lb (59.9 kg) SpO2 BMI OB Status Smoking Status 95% 28.56 kg/m2 Hysterectomy Current Every Day Smoker Vitals History BMI and BSA Data Body Mass Index Body Surface Area 28.56 kg/m 2 1.55 m 2 Preferred Pharmacy Pharmacy Name Phone [address] [address] [place] [address] [address] Your Updated Medication List  
  
   
This list is accurate as of 9/26/18  1:55 PM.  Always use your most recent med list.  
  
  
  
  
 amitriptyline 50 mg tablet Commonly known as:  ELAVIL Take 1 Tab by mouth nightly. aspirin delayed-release 81 mg tablet Commonly known as:  FJOLJ-37 Take 1 Tab by mouth daily. diphenhydrAMINE 25 mg tablet Commonly known as:  BENADRYL Take 50 mg by mouth nightly as needed for Itching. docusate sodium 100 mg capsule Commonly known as:  Delisa Antis Take 100 mg by mouth two (2) times daily as needed for Constipation. fish oil-omega-3 fatty acids 340-1,000 mg capsule Take 1 Cap by mouth daily. fluconazole 150 mg tablet Commonly known as:  DIFLUCAN Take 1 Tab by mouth daily. Repeat in 3 days if needed. ibuprofen 800 mg tablet Commonly known as:  MOTRIN Take 1 Tab by mouth every eight (8) hours as needed for Pain. lisinopril 20 mg tablet Commonly known as:  PRINIVIL, ZESTRIL  
TAKE ONE TABLET BY MOUTH ONCE DAILY  
  
 metFORMIN 1,000 mg tablet Commonly known as:  GLUCOPHAGE Take 1 Tab by mouth two (2) times daily (with meals). NovoLOG Mix 70-30 U-100 Insuln 100 unit/mL (70-30) injection Generic drug:  insulin aspart protamine/insulin aspart 10 Units by SubCUTAneous route Before breakfast and dinner. ondansetron 4 mg disintegrating tablet Commonly known as:  ZOFRAN ODT Take 1 Tab by mouth every eight (8) hours as needed for Nausea. oxybutynin 5 mg tablet Commonly known as:  DITROPAN  
TAKE ONE TABLET BY MOUTH 4 TIMES DAILY  
  
 oxyCODONE-acetaminophen 5-325 mg per tablet Commonly known as:  PERCOCET Take 1 Tab by mouth every four (4) hours as needed for Pain. Max Daily Amount: 6 Tabs. MUST LAST 30 DAYS  
  
 pantoprazole 40 mg tablet Commonly known as:  PROTONIX Take 1 Tab by mouth daily. pioglitazone 30 mg tablet Commonly known as:  ACTOS Take 1 Tab by mouth daily. potassium chloride 10 mEq tablet Commonly known as:  KLOR-CON Take 1 Tab by mouth daily. pravastatin 40 mg tablet Commonly known as:  PRAVACHOL Take 1 Tab by mouth daily. promethazine 25 mg tablet Commonly known as:  PHENERGAN Take 1 Tab by mouth every six (6) hours as needed for Nausea. rOPINIRole 0.5 mg tablet Commonly known as:  Jenetta Keyla Take 1 Tab by mouth nightly. VITAMIN D3 1,000 unit tablet Generic drug:  cholecalciferol Take 1,000 Units by mouth daily. We Performed the Following AMB POC URINALYSIS DIP STICK AUTO W/O MICRO [82089 CPT(R)] AMYLASE [id number] CPT(R)] CBC WITH AUTOMATED DIFF [11392 CPT(R)] INFLUENZA VIRUS VAC QUAD,SPLIT,PRESV FREE SYRINGE IM [id number] CPT(R)] LIPASE [id number] CPT(R)] METABOLIC PANEL, COMPREHENSIVE [45300 CPT(R)] [place] IMMUNIZ ADMIN,1 SINGLE/COMB VAC/TOXOID [id number] CPT(R)] Introducing [facility] SERVICES! Dear Geronimo Davis: 
Thank you for requesting a [employer] account. Our records indicate that you already have an active [employer] account. You can access your account anytime at [email or web address] Did you know that you can access your hospital and ER discharge instructions at any time in [employer]? You can also review all of your test results from your hospital stay or ER visit. Additional Information If you have questions, please visit the Frequently Asked Questions section of the [employer] website at [email or web address] Remember, [employer] is NOT to be used for urgent needs. For medical emergencies, dial 911. Now available from your iPhone and Android! Please provide this summary of care documentation to your next provider. Your primary care clinician is listed as Janee Lyons. If you have any questions after today's visit, please call [phone].

## 2018-09-26 NOTE — PROGRESS NOTES
Chief Complaint   Patient presents with    Nausea    Vomiting     Pt here C/O ongoing nausea since being diagnosed with pancreatitis. Reports having vomiting and fever yesterday. Also C/o abdominal pain that has become worse. Pt reports that she has need to Tums daily to help with symptoms, despite taking Protonix. Pt extensively spoke about issues with [employer] and lack of clarity surrounding her application for assistance. Subjective: (As above and below)     Chief Complaint   Patient presents with    Nausea    Vomiting     she is a 59y.o. year old female who presents for evaluation. Reviewed PmHx, RxHx, FmHx, SocHx, AllgHx and updated in chart. Review of Systems - negative except as listed above    Objective:     Vitals:    09/26/18 1332   BP: 132/88   Pulse: 84   Resp: 16   Temp: 97.7 °F (36.5 °C)   TempSrc: Oral   SpO2: 95%   Weight: 132 lb (59.9 kg)   Height: 4' 9\" (1.448 m)     Physical Examination: General appearance - alert, well appearing, and in no distress  Mental status - normal mood, behavior, speech, dress, motor activity, and thought processes  Mouth - mucous membranes moist, pharynx normal without lesions  Chest - clear to auscultation, no wheezes, rales or rhonchi, symmetric air entry  Heart - normal rate, regular rhythm, normal S1, S2, no murmurs, rubs, clicks or gallops  Abdomen - Diffuse tenderness, normal bowel sounds, soft, no guarding  Musculoskeletal - in wheelchair  Extremities - socks but no shoes on feet, wound dressing in place    Assessment/ Plan:   1. Abdominal pain, unspecified abdominal location  -check labs  - AMB POC URINALYSIS DIP STICK AUTO W/O MICRO  - AMYLASE  - LIPASE  - METABOLIC PANEL, COMPREHENSIVE  - CBC WITH AUTOMATED DIFF    2.  Encounter for immunization  - Influenza virus vaccine (QUADRIVALENT PRES FREE SYRINGE) IM (40385)  - [place] IMMUNIZ ADMIN,1 SINGLE/COMB VAC/TOXOID     Follow-up Disposition: As needed  I have discussed the diagnosis with the patient and the intended plan as seen in the above orders. The patient has received an after-visit summary and questions were answered concerning future plans.      Medication Side Effects and Warnings were discussed with patient: yes  Patient Labs were reviewed: yes  Patient Past Records were reviewed:  yes    Dinesh Tillman M.D.

## 2018-09-27 NOTE — TELEPHONE ENCOUNTER
Please call and advise pt of elevated blood sugar. Please confirm that pt is taking insulin as written, ask pt to check sugar at home now.      Please add an additional 10 units to insulin dose today and continue to monitor blood sugar and adjust dose as needed

## 2018-09-27 NOTE — TELEPHONE ENCOUNTER
Received incoming call from [employer] rep in regards to a critical lab value for pt. Glucose-508. Provider has been informed via chart and provide nurse has been informed via VB.

## 2018-09-27 NOTE — TELEPHONE ENCOUNTER
Pt states she has not been on insulin because she \"did not know she was supposed to be\". Pt states she is going to start taking insulin today and is going to keep a log of BS.

## 2018-10-01 NOTE — PROGRESS NOTES
No evidence of pancreatitis, however blood sugars are VERY elevated. White blood cell count is elevated. Please confirm that you are taking medications as prescribed and follow up in one week for recheck.

## 2018-12-28 NOTE — TELEPHONE ENCOUNTER
Regarding: Prescription Question  Contact: [phone]  ----- Message from [address] [place] [address] 951, Generic sent at 12/28/2018 12:30 AM EST -----    Dr Adriane Blanchard will you please refill my Oxybutynin 5 milligrams,my Promethazine 25 mg, my Silver sufla 1percent cream,my Amitriptylin 50mg , my Ropinirole . 5 mg,  and my Oxycod/acetamin 5-325 mg   Thank you Eric Franco  [phone]

## 2019-01-01 ENCOUNTER — PATIENT OUTREACH (OUTPATIENT)
Dept: FAMILY MEDICINE CLINIC | Age: 59
End: 2019-01-01

## 2019-01-01 ENCOUNTER — TELEPHONE (OUTPATIENT)
Dept: FAMILY MEDICINE CLINIC | Age: 59
End: 2019-01-01

## 2019-01-01 ENCOUNTER — OFFICE VISIT (OUTPATIENT)
Dept: FAMILY MEDICINE CLINIC | Age: 59
End: 2019-01-01

## 2019-01-01 ENCOUNTER — HOSPITAL ENCOUNTER (INPATIENT)
Age: 59
LOS: 3 days | Discharge: LEFT BEFORE DISCHARGE | DRG: 641 | End: 2019-01-14
Attending: EMERGENCY MEDICINE | Admitting: FAMILY MEDICINE
Payer: SELF-PAY

## 2019-01-01 ENCOUNTER — APPOINTMENT (OUTPATIENT)
Dept: CT IMAGING | Age: 59
DRG: 641 | End: 2019-01-01
Attending: STUDENT IN AN ORGANIZED HEALTH CARE EDUCATION/TRAINING PROGRAM
Payer: SELF-PAY

## 2019-01-01 ENCOUNTER — APPOINTMENT (OUTPATIENT)
Dept: GENERAL RADIOLOGY | Age: 59
DRG: 641 | End: 2019-01-01
Attending: STUDENT IN AN ORGANIZED HEALTH CARE EDUCATION/TRAINING PROGRAM
Payer: SELF-PAY

## 2019-01-01 VITALS
RESPIRATION RATE: 19 BRPM | OXYGEN SATURATION: 100 % | WEIGHT: 125 LBS | HEART RATE: 103 BPM | BODY MASS INDEX: 26.97 KG/M2 | DIASTOLIC BLOOD PRESSURE: 71 MMHG | HEIGHT: 57 IN | SYSTOLIC BLOOD PRESSURE: 134 MMHG | TEMPERATURE: 98 F

## 2019-01-01 VITALS
OXYGEN SATURATION: 100 % | RESPIRATION RATE: 16 BRPM | DIASTOLIC BLOOD PRESSURE: 53 MMHG | TEMPERATURE: 98.4 F | WEIGHT: 124.6 LBS | SYSTOLIC BLOOD PRESSURE: 97 MMHG | BODY MASS INDEX: 26.88 KG/M2 | HEART RATE: 114 BPM | HEIGHT: 57 IN

## 2019-01-01 VITALS
HEART RATE: 87 BPM | OXYGEN SATURATION: 93 % | TEMPERATURE: 97.3 F | RESPIRATION RATE: 16 BRPM | DIASTOLIC BLOOD PRESSURE: 63 MMHG | SYSTOLIC BLOOD PRESSURE: 88 MMHG | BODY MASS INDEX: 24.2 KG/M2 | HEIGHT: 57 IN | WEIGHT: 112.2 LBS

## 2019-01-01 VITALS
DIASTOLIC BLOOD PRESSURE: 82 MMHG | SYSTOLIC BLOOD PRESSURE: 134 MMHG | TEMPERATURE: 97.3 F | HEART RATE: 97 BPM | HEIGHT: 57 IN | OXYGEN SATURATION: 98 % | BODY MASS INDEX: 24.59 KG/M2 | WEIGHT: 114 LBS | RESPIRATION RATE: 20 BRPM

## 2019-01-01 DIAGNOSIS — I10 ESSENTIAL HYPERTENSION: ICD-10-CM

## 2019-01-01 DIAGNOSIS — E87.6 HYPOKALEMIA: ICD-10-CM

## 2019-01-01 DIAGNOSIS — S91.002S WOUND OF LEFT ANKLE, SEQUELA: ICD-10-CM

## 2019-01-01 DIAGNOSIS — E11.65 UNCONTROLLED TYPE 2 DIABETES MELLITUS WITH HYPERGLYCEMIA (HCC): Primary | ICD-10-CM

## 2019-01-01 DIAGNOSIS — E87.6 ACUTE HYPOKALEMIA: Primary | ICD-10-CM

## 2019-01-01 DIAGNOSIS — K50.918 CROHN'S DISEASE WITH OTHER COMPLICATION, UNSPECIFIED GASTROINTESTINAL TRACT LOCATION (HCC): Primary | ICD-10-CM

## 2019-01-01 DIAGNOSIS — R19.5 DARK STOOLS: ICD-10-CM

## 2019-01-01 DIAGNOSIS — L97.529 NON-HEALING ULCER OF LEFT FOOT, UNSPECIFIED ULCER STAGE (HCC): ICD-10-CM

## 2019-01-01 DIAGNOSIS — G80.9 CEREBRAL PALSY, UNSPECIFIED TYPE (HCC): ICD-10-CM

## 2019-01-01 DIAGNOSIS — I77.1 ARTERIAL INSUFFICIENCY WITH ISCHEMIC ULCER (HCC): ICD-10-CM

## 2019-01-01 DIAGNOSIS — L98.499 ARTERIAL INSUFFICIENCY WITH ISCHEMIC ULCER (HCC): ICD-10-CM

## 2019-01-01 DIAGNOSIS — E11.65 UNCONTROLLED TYPE 2 DIABETES MELLITUS WITH HYPERGLYCEMIA (HCC): ICD-10-CM

## 2019-01-01 DIAGNOSIS — Z59.9 FINANCIAL DIFFICULTIES: ICD-10-CM

## 2019-01-01 DIAGNOSIS — G80.9 CEREBRAL PALSY, UNSPECIFIED TYPE (HCC): Primary | ICD-10-CM

## 2019-01-01 DIAGNOSIS — E55.9 VITAMIN D DEFICIENCY: ICD-10-CM

## 2019-01-01 DIAGNOSIS — S31.000A WOUND OF SACRAL REGION, INITIAL ENCOUNTER: ICD-10-CM

## 2019-01-01 DIAGNOSIS — G89.29 OTHER CHRONIC PAIN: ICD-10-CM

## 2019-01-01 LAB
25(OH)D3+25(OH)D2 SERPL-MCNC: <4 NG/ML (ref 30–100)
AEROBIC ID BY MALDI, ORJ11T: NORMAL
ALBUMIN SERPL-MCNC: 2.2 G/DL (ref 3.5–5)
ALBUMIN SERPL-MCNC: 2.4 G/DL (ref 3.5–5)
ALBUMIN SERPL-MCNC: 2.4 G/DL (ref 3.5–5)
ALBUMIN SERPL-MCNC: 2.8 G/DL (ref 3.5–5)
ALBUMIN SERPL-MCNC: 3.5 G/DL (ref 3.6–4.8)
ALBUMIN SERPL-MCNC: 4.1 G/DL (ref 3.6–4.8)
ALBUMIN SERPL-MCNC: 4.2 G/DL (ref 3.6–4.8)
ALBUMIN/GLOB SERPL: 0.6 {RATIO} (ref 1.1–2.2)
ALBUMIN/GLOB SERPL: 0.7 {RATIO} (ref 1.1–2.2)
ALBUMIN/GLOB SERPL: 1.3 {RATIO} (ref 1.2–2.2)
ALBUMIN/GLOB SERPL: 1.6 {RATIO} (ref 1.2–2.2)
ALBUMIN/GLOB SERPL: 1.7 {RATIO} (ref 1.2–2.2)
ALP SERPL-CCNC: 114 U/L (ref 45–117)
ALP SERPL-CCNC: 116 IU/L (ref 39–117)
ALP SERPL-CCNC: 133 U/L (ref 45–117)
ALP SERPL-CCNC: 134 IU/L (ref 39–117)
ALP SERPL-CCNC: 134 U/L (ref 45–117)
ALP SERPL-CCNC: 140 U/L (ref 45–117)
ALP SERPL-CCNC: 151 IU/L (ref 39–117)
ALT SERPL-CCNC: 10 IU/L (ref 0–32)
ALT SERPL-CCNC: 14 IU/L (ref 0–32)
ALT SERPL-CCNC: 18 IU/L (ref 0–32)
ALT SERPL-CCNC: 22 U/L (ref 12–78)
ALT SERPL-CCNC: 25 U/L (ref 12–78)
ALT SERPL-CCNC: 25 U/L (ref 12–78)
ALT SERPL-CCNC: 27 U/L (ref 12–78)
AMORPH CRY URNS QL MICRO: ABNORMAL
AMPHET UR QL SCN: NEGATIVE
ANION GAP SERPL CALC-SCNC: 13 MMOL/L (ref 5–15)
ANION GAP SERPL CALC-SCNC: 14 MMOL/L (ref 5–15)
ANION GAP SERPL CALC-SCNC: 15 MMOL/L (ref 5–15)
ANION GAP SERPL CALC-SCNC: 15 MMOL/L (ref 5–15)
ANION GAP SERPL CALC-SCNC: 17 MMOL/L (ref 5–15)
ANION GAP SERPL CALC-SCNC: 18 MMOL/L (ref 5–15)
APPEARANCE UR: ABNORMAL
APPEARANCE UR: ABNORMAL
AST SERPL-CCNC: 10 IU/L (ref 0–40)
AST SERPL-CCNC: 12 IU/L (ref 0–40)
AST SERPL-CCNC: 12 IU/L (ref 0–40)
AST SERPL-CCNC: 13 U/L (ref 15–37)
AST SERPL-CCNC: 14 U/L (ref 15–37)
AST SERPL-CCNC: 16 U/L (ref 15–37)
AST SERPL-CCNC: 16 U/L (ref 15–37)
ATRIAL RATE: 99 BPM
B-OH-BUTYR SERPL-SCNC: 0.19 MMOL/L
BACTERIA ISLT: NORMAL
BACTERIA SPEC CULT: ABNORMAL
BACTERIA SPEC CULT: NORMAL
BACTERIA URNS QL MICRO: ABNORMAL /HPF
BACTERIA URNS QL MICRO: ABNORMAL /HPF
BARBITURATES UR QL SCN: NEGATIVE
BASE DEFICIT BLDV-SCNC: 12.6 MMOL/L
BASOPHILS # BLD AUTO: 0 X10E3/UL (ref 0–0.2)
BASOPHILS # BLD AUTO: 0.1 X10E3/UL (ref 0–0.2)
BASOPHILS # BLD AUTO: 0.1 X10E3/UL (ref 0–0.2)
BASOPHILS # BLD: 0.1 K/UL (ref 0–0.1)
BASOPHILS NFR BLD AUTO: 0 %
BASOPHILS NFR BLD: 0 % (ref 0–1)
BASOPHILS NFR BLD: 0 % (ref 0–1)
BASOPHILS NFR BLD: 1 % (ref 0–1)
BASOPHILS NFR BLD: 1 % (ref 0–1)
BDY SITE: ABNORMAL
BENZODIAZ UR QL: NEGATIVE
BILIRUB SERPL-MCNC: 0.2 MG/DL (ref 0.2–1)
BILIRUB SERPL-MCNC: <0.2 MG/DL (ref 0–1.2)
BILIRUB UR QL: NEGATIVE
BILIRUB UR QL: NEGATIVE
BUN SERPL-MCNC: 12 MG/DL (ref 6–20)
BUN SERPL-MCNC: 13 MG/DL (ref 6–20)
BUN SERPL-MCNC: 14 MG/DL (ref 6–20)
BUN SERPL-MCNC: 17 MG/DL (ref 6–20)
BUN SERPL-MCNC: 18 MG/DL (ref 6–20)
BUN SERPL-MCNC: 19 MG/DL (ref 8–27)
BUN SERPL-MCNC: 20 MG/DL (ref 6–20)
BUN SERPL-MCNC: 23 MG/DL (ref 6–20)
BUN SERPL-MCNC: 26 MG/DL (ref 6–20)
BUN SERPL-MCNC: 29 MG/DL (ref 6–20)
BUN SERPL-MCNC: 32 MG/DL (ref 6–20)
BUN SERPL-MCNC: 34 MG/DL (ref 6–20)
BUN SERPL-MCNC: 58 MG/DL (ref 8–27)
BUN SERPL-MCNC: 9 MG/DL (ref 8–27)
BUN/CREAT SERPL: 18 (ref 12–20)
BUN/CREAT SERPL: 18 (ref 12–28)
BUN/CREAT SERPL: 19 (ref 12–20)
BUN/CREAT SERPL: 19 (ref 12–20)
BUN/CREAT SERPL: 24 (ref 12–20)
BUN/CREAT SERPL: 25 (ref 12–20)
BUN/CREAT SERPL: 26 (ref 12–20)
BUN/CREAT SERPL: 29 (ref 12–20)
BUN/CREAT SERPL: 30 (ref 12–28)
BUN/CREAT SERPL: 31 (ref 12–20)
BUN/CREAT SERPL: 32 (ref 12–20)
BUN/CREAT SERPL: 35 (ref 12–20)
BUN/CREAT SERPL: 36 (ref 12–20)
BUN/CREAT SERPL: 48 (ref 12–28)
CALCIUM SERPL-MCNC: 7.6 MG/DL (ref 8.5–10.1)
CALCIUM SERPL-MCNC: 7.8 MG/DL (ref 8.5–10.1)
CALCIUM SERPL-MCNC: 7.9 MG/DL (ref 8.5–10.1)
CALCIUM SERPL-MCNC: 7.9 MG/DL (ref 8.5–10.1)
CALCIUM SERPL-MCNC: 8 MG/DL (ref 8.5–10.1)
CALCIUM SERPL-MCNC: 8.1 MG/DL (ref 8.5–10.1)
CALCIUM SERPL-MCNC: 8.2 MG/DL (ref 8.5–10.1)
CALCIUM SERPL-MCNC: 8.4 MG/DL (ref 8.7–10.3)
CALCIUM SERPL-MCNC: 9.1 MG/DL (ref 8.7–10.3)
CALCIUM SERPL-MCNC: 9.2 MG/DL (ref 8.5–10.1)
CALCIUM SERPL-MCNC: 9.6 MG/DL (ref 8.7–10.3)
CALCULATED P AXIS, ECG09: 70 DEGREES
CALCULATED R AXIS, ECG10: 73 DEGREES
CALCULATED T AXIS, ECG11: 62 DEGREES
CANNABINOIDS UR QL SCN: NEGATIVE
CC UR VC: ABNORMAL
CHLORIDE SERPL-SCNC: 104 MMOL/L (ref 96–106)
CHLORIDE SERPL-SCNC: 104 MMOL/L (ref 97–108)
CHLORIDE SERPL-SCNC: 105 MMOL/L (ref 96–106)
CHLORIDE SERPL-SCNC: 105 MMOL/L (ref 97–108)
CHLORIDE SERPL-SCNC: 106 MMOL/L (ref 97–108)
CHLORIDE SERPL-SCNC: 108 MMOL/L (ref 97–108)
CHLORIDE SERPL-SCNC: 108 MMOL/L (ref 97–108)
CHLORIDE SERPL-SCNC: 109 MMOL/L (ref 97–108)
CHLORIDE SERPL-SCNC: 110 MMOL/L (ref 97–108)
CHLORIDE SERPL-SCNC: 111 MMOL/L (ref 97–108)
CHLORIDE SERPL-SCNC: 112 MMOL/L (ref 96–106)
CHOLEST SERPL-MCNC: 149 MG/DL (ref 100–199)
CO2 SERPL-SCNC: 11 MMOL/L (ref 20–29)
CO2 SERPL-SCNC: 11 MMOL/L (ref 21–32)
CO2 SERPL-SCNC: 13 MMOL/L (ref 20–29)
CO2 SERPL-SCNC: 16 MMOL/L (ref 21–32)
CO2 SERPL-SCNC: 17 MMOL/L (ref 20–29)
CO2 SERPL-SCNC: 17 MMOL/L (ref 21–32)
CO2 SERPL-SCNC: 17 MMOL/L (ref 21–32)
CO2 SERPL-SCNC: 18 MMOL/L (ref 21–32)
CO2 SERPL-SCNC: 19 MMOL/L (ref 21–32)
CO2 SERPL-SCNC: 19 MMOL/L (ref 21–32)
COCAINE UR QL SCN: NEGATIVE
COLOR UR: ABNORMAL
COLOR UR: ABNORMAL
COMMENT, HOLDF: NORMAL
CREAT SERPL-MCNC: 0.51 MG/DL (ref 0.57–1)
CREAT SERPL-MCNC: 0.63 MG/DL (ref 0.57–1)
CREAT SERPL-MCNC: 0.67 MG/DL (ref 0.55–1.02)
CREAT SERPL-MCNC: 0.69 MG/DL (ref 0.55–1.02)
CREAT SERPL-MCNC: 0.7 MG/DL (ref 0.55–1.02)
CREAT SERPL-MCNC: 0.7 MG/DL (ref 0.55–1.02)
CREAT SERPL-MCNC: 0.74 MG/DL (ref 0.55–1.02)
CREAT SERPL-MCNC: 0.78 MG/DL (ref 0.55–1.02)
CREAT SERPL-MCNC: 0.81 MG/DL (ref 0.55–1.02)
CREAT SERPL-MCNC: 0.82 MG/DL (ref 0.55–1.02)
CREAT SERPL-MCNC: 0.84 MG/DL (ref 0.55–1.02)
CREAT SERPL-MCNC: 0.9 MG/DL (ref 0.55–1.02)
CREAT SERPL-MCNC: 1.06 MG/DL (ref 0.55–1.02)
CREAT SERPL-MCNC: 1.22 MG/DL (ref 0.57–1)
CREAT UR-MCNC: 38.68 MG/DL
DIAGNOSIS, 93000: NORMAL
DIFFERENTIAL METHOD BLD: ABNORMAL
DRUG SCRN COMMENT,DRGCM: NORMAL
EOSINOPHIL # BLD AUTO: 0.4 X10E3/UL (ref 0–0.4)
EOSINOPHIL # BLD AUTO: 0.4 X10E3/UL (ref 0–0.4)
EOSINOPHIL # BLD AUTO: 0.5 X10E3/UL (ref 0–0.4)
EOSINOPHIL # BLD: 0.3 K/UL (ref 0–0.4)
EOSINOPHIL # BLD: 0.4 K/UL (ref 0–0.4)
EOSINOPHIL # BLD: 0.4 K/UL (ref 0–0.4)
EOSINOPHIL # BLD: 0.5 K/UL (ref 0–0.4)
EOSINOPHIL NFR BLD AUTO: 2 %
EOSINOPHIL NFR BLD AUTO: 3 %
EOSINOPHIL NFR BLD AUTO: 5 %
EOSINOPHIL NFR BLD: 2 % (ref 0–7)
EOSINOPHIL NFR BLD: 3 % (ref 0–7)
EPITH CASTS URNS QL MICRO: ABNORMAL /LPF
EPITH CASTS URNS QL MICRO: ABNORMAL /LPF
ERYTHROCYTE [DISTWIDTH] IN BLOOD BY AUTOMATED COUNT: 14.1 % (ref 12.3–15.4)
ERYTHROCYTE [DISTWIDTH] IN BLOOD BY AUTOMATED COUNT: 14.6 % (ref 12.3–15.4)
ERYTHROCYTE [DISTWIDTH] IN BLOOD BY AUTOMATED COUNT: 15.3 % (ref 12.3–15.4)
ERYTHROCYTE [DISTWIDTH] IN BLOOD BY AUTOMATED COUNT: 15.7 % (ref 11.5–14.5)
ERYTHROCYTE [DISTWIDTH] IN BLOOD BY AUTOMATED COUNT: 15.9 % (ref 11.5–14.5)
ERYTHROCYTE [DISTWIDTH] IN BLOOD BY AUTOMATED COUNT: 16.3 % (ref 11.5–14.5)
ERYTHROCYTE [DISTWIDTH] IN BLOOD BY AUTOMATED COUNT: 16.4 % (ref 11.5–14.5)
EST. AVERAGE GLUCOSE BLD GHB EST-MCNC: 137 MG/DL
GLOBULIN SER CALC-MCNC: 2.4 G/DL (ref 1.5–4.5)
GLOBULIN SER CALC-MCNC: 2.6 G/DL (ref 1.5–4.5)
GLOBULIN SER CALC-MCNC: 2.7 G/DL (ref 1.5–4.5)
GLOBULIN SER CALC-MCNC: 3.4 G/DL (ref 2–4)
GLOBULIN SER CALC-MCNC: 3.6 G/DL (ref 2–4)
GLOBULIN SER CALC-MCNC: 3.8 G/DL (ref 2–4)
GLOBULIN SER CALC-MCNC: 4.1 G/DL (ref 2–4)
GLUCOSE BLD STRIP.AUTO-MCNC: 154 MG/DL (ref 65–100)
GLUCOSE BLD STRIP.AUTO-MCNC: 166 MG/DL (ref 65–100)
GLUCOSE BLD STRIP.AUTO-MCNC: 177 MG/DL (ref 65–100)
GLUCOSE BLD STRIP.AUTO-MCNC: 189 MG/DL (ref 65–100)
GLUCOSE BLD STRIP.AUTO-MCNC: 191 MG/DL (ref 65–100)
GLUCOSE BLD STRIP.AUTO-MCNC: 199 MG/DL (ref 65–100)
GLUCOSE BLD STRIP.AUTO-MCNC: 208 MG/DL (ref 65–100)
GLUCOSE BLD STRIP.AUTO-MCNC: 223 MG/DL (ref 65–100)
GLUCOSE BLD STRIP.AUTO-MCNC: 230 MG/DL (ref 65–100)
GLUCOSE BLD STRIP.AUTO-MCNC: 246 MG/DL (ref 65–100)
GLUCOSE BLD STRIP.AUTO-MCNC: 257 MG/DL (ref 65–100)
GLUCOSE BLD STRIP.AUTO-MCNC: 269 MG/DL (ref 65–100)
GLUCOSE SERPL-MCNC: 112 MG/DL (ref 65–99)
GLUCOSE SERPL-MCNC: 153 MG/DL (ref 65–99)
GLUCOSE SERPL-MCNC: 156 MG/DL (ref 65–100)
GLUCOSE SERPL-MCNC: 162 MG/DL (ref 65–99)
GLUCOSE SERPL-MCNC: 174 MG/DL (ref 65–100)
GLUCOSE SERPL-MCNC: 183 MG/DL (ref 65–100)
GLUCOSE SERPL-MCNC: 184 MG/DL (ref 65–100)
GLUCOSE SERPL-MCNC: 188 MG/DL (ref 65–100)
GLUCOSE SERPL-MCNC: 201 MG/DL (ref 65–100)
GLUCOSE SERPL-MCNC: 205 MG/DL (ref 65–100)
GLUCOSE SERPL-MCNC: 212 MG/DL (ref 65–100)
GLUCOSE SERPL-MCNC: 219 MG/DL (ref 65–100)
GLUCOSE SERPL-MCNC: 223 MG/DL (ref 65–100)
GLUCOSE SERPL-MCNC: 255 MG/DL (ref 65–100)
GLUCOSE UR STRIP.AUTO-MCNC: NEGATIVE MG/DL
GLUCOSE UR STRIP.AUTO-MCNC: NEGATIVE MG/DL
HBA1C MFR BLD HPLC: 7.4 %
HBA1C MFR BLD: 6.4 % (ref 4.8–5.6)
HCO3 BLDV-SCNC: 14 MMOL/L (ref 23–28)
HCT VFR BLD AUTO: 27.9 % (ref 35–47)
HCT VFR BLD AUTO: 28.2 % (ref 35–47)
HCT VFR BLD AUTO: 28.3 % (ref 35–47)
HCT VFR BLD AUTO: 28.5 % (ref 35–47)
HCT VFR BLD AUTO: 29.9 % (ref 34–46.6)
HCT VFR BLD AUTO: 33 % (ref 35–47)
HCT VFR BLD AUTO: 35.9 % (ref 34–46.6)
HCT VFR BLD AUTO: 36.2 % (ref 34–46.6)
HDLC SERPL-MCNC: 30 MG/DL
HEMOCCULT STL QL IA: NEGATIVE
HGB BLD-MCNC: 11.5 G/DL (ref 11.5–16)
HGB BLD-MCNC: 11.8 G/DL (ref 11.1–15.9)
HGB BLD-MCNC: 12.5 G/DL (ref 11.1–15.9)
HGB BLD-MCNC: 9.5 G/DL (ref 11.5–16)
HGB BLD-MCNC: 9.5 G/DL (ref 11.5–16)
HGB BLD-MCNC: 9.6 G/DL (ref 11.1–15.9)
HGB BLD-MCNC: 9.7 G/DL (ref 11.5–16)
HGB BLD-MCNC: 9.8 G/DL (ref 11.5–16)
HGB UR QL STRIP: NEGATIVE
HGB UR QL STRIP: NEGATIVE
IMM GRANULOCYTES # BLD AUTO: 0 X10E3/UL (ref 0–0.1)
IMM GRANULOCYTES # BLD AUTO: 0 X10E3/UL (ref 0–0.1)
IMM GRANULOCYTES # BLD AUTO: 0.1 K/UL (ref 0–0.04)
IMM GRANULOCYTES # BLD AUTO: 0.1 X10E3/UL (ref 0–0.1)
IMM GRANULOCYTES # BLD AUTO: 0.2 K/UL (ref 0–0.04)
IMM GRANULOCYTES NFR BLD AUTO: 0 %
IMM GRANULOCYTES NFR BLD AUTO: 0 %
IMM GRANULOCYTES NFR BLD AUTO: 1 %
IMM GRANULOCYTES NFR BLD AUTO: 1 % (ref 0–0.5)
INTERPRETATION, 910389: NORMAL
INTERPRETATION: NORMAL
KETONES UR QL STRIP.AUTO: NEGATIVE MG/DL
KETONES UR QL STRIP.AUTO: NEGATIVE MG/DL
LACTATE SERPL-SCNC: 0.9 MMOL/L (ref 0.4–2)
LDLC SERPL CALC-MCNC: 75 MG/DL (ref 0–99)
LEUKOCYTE ESTERASE UR QL STRIP.AUTO: ABNORMAL
LEUKOCYTE ESTERASE UR QL STRIP.AUTO: ABNORMAL
LYMPHOCYTES # BLD AUTO: 2.9 X10E3/UL (ref 0.7–3.1)
LYMPHOCYTES # BLD AUTO: 3.1 X10E3/UL (ref 0.7–3.1)
LYMPHOCYTES # BLD AUTO: 3.6 X10E3/UL (ref 0.7–3.1)
LYMPHOCYTES # BLD: 2.7 K/UL (ref 0.8–3.5)
LYMPHOCYTES # BLD: 3.3 K/UL (ref 0.8–3.5)
LYMPHOCYTES # BLD: 3.5 K/UL (ref 0.8–3.5)
LYMPHOCYTES # BLD: 3.8 K/UL (ref 0.8–3.5)
LYMPHOCYTES NFR BLD AUTO: 19 %
LYMPHOCYTES NFR BLD AUTO: 25 %
LYMPHOCYTES NFR BLD AUTO: 35 %
LYMPHOCYTES NFR BLD: 14 % (ref 12–49)
LYMPHOCYTES NFR BLD: 18 % (ref 12–49)
LYMPHOCYTES NFR BLD: 19 % (ref 12–49)
LYMPHOCYTES NFR BLD: 21 % (ref 12–49)
Lab: NORMAL
Lab: NORMAL
MAGNESIUM SERPL-MCNC: 1.5 MG/DL (ref 1.6–2.4)
MAGNESIUM SERPL-MCNC: 1.7 MG/DL (ref 1.6–2.4)
MAGNESIUM SERPL-MCNC: 1.8 MG/DL (ref 1.6–2.4)
MAGNESIUM SERPL-MCNC: 1.8 MG/DL (ref 1.6–2.4)
MCH RBC QN AUTO: 28.1 PG (ref 26.6–33)
MCH RBC QN AUTO: 28.1 PG (ref 26–34)
MCH RBC QN AUTO: 28.4 PG (ref 26.6–33)
MCH RBC QN AUTO: 28.7 PG (ref 26.6–33)
MCH RBC QN AUTO: 28.8 PG (ref 26–34)
MCH RBC QN AUTO: 28.8 PG (ref 26–34)
MCH RBC QN AUTO: 29.1 PG (ref 26–34)
MCHC RBC AUTO-ENTMCNC: 32.1 G/DL (ref 31.5–35.7)
MCHC RBC AUTO-ENTMCNC: 32.6 G/DL (ref 31.5–35.7)
MCHC RBC AUTO-ENTMCNC: 33.6 G/DL (ref 30–36.5)
MCHC RBC AUTO-ENTMCNC: 34.1 G/DL (ref 30–36.5)
MCHC RBC AUTO-ENTMCNC: 34.4 G/DL (ref 30–36.5)
MCHC RBC AUTO-ENTMCNC: 34.8 G/DL (ref 30–36.5)
MCHC RBC AUTO-ENTMCNC: 34.8 G/DL (ref 31.5–35.7)
MCV RBC AUTO: 81.7 FL (ref 80–99)
MCV RBC AUTO: 82.7 FL (ref 80–99)
MCV RBC AUTO: 83 FL (ref 79–97)
MCV RBC AUTO: 85.3 FL (ref 80–99)
MCV RBC AUTO: 85.8 FL (ref 80–99)
MCV RBC AUTO: 86 FL (ref 79–97)
MCV RBC AUTO: 89 FL (ref 79–97)
METHADONE UR QL: NEGATIVE
MONOCYTES # BLD AUTO: 0.5 X10E3/UL (ref 0.1–0.9)
MONOCYTES # BLD AUTO: 0.5 X10E3/UL (ref 0.1–0.9)
MONOCYTES # BLD AUTO: 0.8 X10E3/UL (ref 0.1–0.9)
MONOCYTES # BLD: 0.6 K/UL (ref 0–1)
MONOCYTES # BLD: 0.8 K/UL (ref 0–1)
MONOCYTES # BLD: 0.8 K/UL (ref 0–1)
MONOCYTES # BLD: 1 K/UL (ref 0–1)
MONOCYTES NFR BLD AUTO: 4 %
MONOCYTES NFR BLD AUTO: 4 %
MONOCYTES NFR BLD AUTO: 5 %
MONOCYTES NFR BLD: 3 % (ref 5–13)
MONOCYTES NFR BLD: 5 % (ref 5–13)
NEUTROPHILS # BLD AUTO: 13.8 X10E3/UL (ref 1.4–7)
NEUTROPHILS # BLD AUTO: 5 X10E3/UL (ref 1.4–7)
NEUTROPHILS # BLD AUTO: 8 X10E3/UL (ref 1.4–7)
NEUTROPHILS NFR BLD AUTO: 55 %
NEUTROPHILS NFR BLD AUTO: 68 %
NEUTROPHILS NFR BLD AUTO: 74 %
NEUTS SEG # BLD: 11.8 K/UL (ref 1.8–8)
NEUTS SEG # BLD: 12.6 K/UL (ref 1.8–8)
NEUTS SEG # BLD: 15 K/UL (ref 1.8–8)
NEUTS SEG # BLD: 15.3 K/UL (ref 1.8–8)
NEUTS SEG NFR BLD: 71 % (ref 32–75)
NEUTS SEG NFR BLD: 73 % (ref 32–75)
NEUTS SEG NFR BLD: 73 % (ref 32–75)
NEUTS SEG NFR BLD: 80 % (ref 32–75)
NITRITE UR QL STRIP.AUTO: NEGATIVE
NITRITE UR QL STRIP.AUTO: NEGATIVE
NRBC # BLD: 0 K/UL (ref 0–0.01)
NRBC BLD-RTO: 0 PER 100 WBC
OPIATES UR QL: NEGATIVE
ORGANISM ID BY MALDI, ORJ1T: NORMAL
OSMOLALITY SERPL: 309 MOSM/KG H2O
OSMOLALITY UR: 451 MOSM/KG H2O
OTHER ANTIBIOTIC SUSC ISLT: NORMAL
OTHER ANTIBIOTIC SUSC ISLT: NORMAL
P-R INTERVAL, ECG05: 158 MS
PCO2 BLDV: 34 MMHG (ref 41–51)
PCP UR QL: NEGATIVE
PH BLDV: 7.23 [PH] (ref 7.32–7.42)
PH UR STRIP: 7 [PH] (ref 5–8)
PH UR STRIP: 7 [PH] (ref 5–8)
PHOSPHATE SERPL-MCNC: 2.7 MG/DL (ref 2.6–4.7)
PHOSPHATE SERPL-MCNC: 2.7 MG/DL (ref 2.6–4.7)
PHOSPHATE SERPL-MCNC: 2.8 MG/DL (ref 2.6–4.7)
PLATELET # BLD AUTO: 384 K/UL (ref 150–400)
PLATELET # BLD AUTO: 398 K/UL (ref 150–400)
PLATELET # BLD AUTO: 426 K/UL (ref 150–400)
PLATELET # BLD AUTO: 502 K/UL (ref 150–400)
PLATELET # BLD AUTO: 535 X10E3/UL (ref 150–379)
PLATELET # BLD AUTO: 635 X10E3/UL (ref 150–379)
PLATELET # BLD AUTO: 759 X10E3/UL (ref 150–379)
PMV BLD AUTO: 8.8 FL (ref 8.9–12.9)
PMV BLD AUTO: 8.9 FL (ref 8.9–12.9)
PMV BLD AUTO: 8.9 FL (ref 8.9–12.9)
PMV BLD AUTO: 9 FL (ref 8.9–12.9)
PO2 BLDV: 34 MMHG (ref 25–40)
POTASSIUM SERPL-SCNC: 1.6 MMOL/L (ref 3.5–5.1)
POTASSIUM SERPL-SCNC: 2 MMOL/L (ref 3.5–5.1)
POTASSIUM SERPL-SCNC: 2 MMOL/L (ref 3.5–5.2)
POTASSIUM SERPL-SCNC: 2.5 MMOL/L (ref 3.5–5.1)
POTASSIUM SERPL-SCNC: 3 MMOL/L (ref 3.5–5.1)
POTASSIUM SERPL-SCNC: 3.1 MMOL/L (ref 3.5–5.1)
POTASSIUM SERPL-SCNC: 3.1 MMOL/L (ref 3.5–5.1)
POTASSIUM SERPL-SCNC: 3.1 MMOL/L (ref 3.5–5.2)
POTASSIUM SERPL-SCNC: 3.3 MMOL/L (ref 3.5–5.1)
POTASSIUM SERPL-SCNC: 3.3 MMOL/L (ref 3.5–5.1)
POTASSIUM SERPL-SCNC: 3.4 MMOL/L (ref 3.5–5.2)
POTASSIUM SERPL-SCNC: 3.5 MMOL/L (ref 3.5–5.1)
POTASSIUM SERPL-SCNC: 3.7 MMOL/L (ref 3.5–5.1)
POTASSIUM SERPL-SCNC: 3.7 MMOL/L (ref 3.5–5.1)
POTASSIUM UR-SCNC: 14 MMOL/L
PROT SERPL-MCNC: 5.8 G/DL (ref 6.4–8.2)
PROT SERPL-MCNC: 6 G/DL (ref 6.4–8.2)
PROT SERPL-MCNC: 6 G/DL (ref 6.4–8.2)
PROT SERPL-MCNC: 6.2 G/DL (ref 6–8.5)
PROT SERPL-MCNC: 6.5 G/DL (ref 6–8.5)
PROT SERPL-MCNC: 6.8 G/DL (ref 6–8.5)
PROT SERPL-MCNC: 6.9 G/DL (ref 6.4–8.2)
PROT UR STRIP-MCNC: 30 MG/DL
PROT UR STRIP-MCNC: ABNORMAL MG/DL
PROT UR-MCNC: 35 MG/DL (ref 0–11.9)
PROT/CREAT UR-RTO: 0.9
Q-T INTERVAL, ECG07: 450 MS
QRS DURATION, ECG06: 92 MS
QTC CALCULATION (BEZET), ECG08: 577 MS
RBC # BLD AUTO: 3.27 M/UL (ref 3.8–5.2)
RBC # BLD AUTO: 3.3 M/UL (ref 3.8–5.2)
RBC # BLD AUTO: 3.38 X10E6/UL (ref 3.77–5.28)
RBC # BLD AUTO: 3.45 M/UL (ref 3.8–5.2)
RBC # BLD AUTO: 3.99 M/UL (ref 3.8–5.2)
RBC # BLD AUTO: 4.2 X10E6/UL (ref 3.77–5.28)
RBC # BLD AUTO: 4.35 X10E6/UL (ref 3.77–5.28)
RBC #/AREA URNS HPF: ABNORMAL /HPF (ref 0–5)
RBC #/AREA URNS HPF: ABNORMAL /HPF (ref 0–5)
SAMPLES BEING HELD,HOLD: NORMAL
SAO2 % BLDV: 56 % (ref 65–88)
SAO2% DEVICE SAO2% SENSOR NAME: ABNORMAL
SERVICE CMNT-IMP: ABNORMAL
SERVICE CMNT-IMP: NORMAL
SODIUM SERPL-SCNC: 138 MMOL/L (ref 136–145)
SODIUM SERPL-SCNC: 138 MMOL/L (ref 136–145)
SODIUM SERPL-SCNC: 139 MMOL/L (ref 136–145)
SODIUM SERPL-SCNC: 140 MMOL/L (ref 134–144)
SODIUM SERPL-SCNC: 140 MMOL/L (ref 134–144)
SODIUM SERPL-SCNC: 140 MMOL/L (ref 136–145)
SODIUM SERPL-SCNC: 140 MMOL/L (ref 136–145)
SODIUM SERPL-SCNC: 141 MMOL/L (ref 134–144)
SODIUM SERPL-SCNC: 141 MMOL/L (ref 136–145)
SOURCE, RSRC62: NORMAL
SOURCE, RSRC67: NORMAL
SOURCE, RSRC70: NORMAL
SP GR UR REFRACTOMETRY: 1.01 (ref 1–1.03)
SP GR UR REFRACTOMETRY: <1.005 (ref 1–1.03)
SPECIMEN SITE: ABNORMAL
SPECIMEN SOURCE: NORMAL
TRI-PHOS CRY URNS QL MICRO: ABNORMAL
TRIGL SERPL-MCNC: 220 MG/DL (ref 0–149)
TSH SERPL DL<=0.005 MIU/L-ACNC: 1.03 UIU/ML (ref 0.45–4.5)
UA: UC IF INDICATED,UAUC: ABNORMAL
UR CULT HOLD, URHOLD: NORMAL
UROBILINOGEN UR QL STRIP.AUTO: 0.2 EU/DL (ref 0.2–1)
UROBILINOGEN UR QL STRIP.AUTO: 0.2 EU/DL (ref 0.2–1)
VENTRICULAR RATE, ECG03: 99 BPM
VLDLC SERPL CALC-MCNC: 44 MG/DL (ref 5–40)
WBC # BLD AUTO: 11.8 X10E3/UL (ref 3.4–10.8)
WBC # BLD AUTO: 16.7 K/UL (ref 3.6–11)
WBC # BLD AUTO: 17.2 K/UL (ref 3.6–11)
WBC # BLD AUTO: 18.8 K/UL (ref 3.6–11)
WBC # BLD AUTO: 18.8 X10E3/UL (ref 3.4–10.8)
WBC # BLD AUTO: 20.9 K/UL (ref 3.6–11)
WBC # BLD AUTO: 9.1 X10E3/UL (ref 3.4–10.8)
WBC URNS QL MICRO: ABNORMAL /HPF (ref 0–4)
WBC URNS QL MICRO: ABNORMAL /HPF (ref 0–4)

## 2019-01-01 PROCEDURE — 80053 COMPREHEN METABOLIC PANEL: CPT

## 2019-01-01 PROCEDURE — 77030011256 HC DRSG MEPILEX <16IN NO BORD MOLN -A

## 2019-01-01 PROCEDURE — 83735 ASSAY OF MAGNESIUM: CPT

## 2019-01-01 PROCEDURE — 71260 CT THORAX DX C+: CPT

## 2019-01-01 PROCEDURE — 82803 BLOOD GASES ANY COMBINATION: CPT

## 2019-01-01 PROCEDURE — 84133 ASSAY OF URINE POTASSIUM: CPT

## 2019-01-01 PROCEDURE — 74011250636 HC RX REV CODE- 250/636: Performed by: FAMILY MEDICINE

## 2019-01-01 PROCEDURE — 84100 ASSAY OF PHOSPHORUS: CPT

## 2019-01-01 PROCEDURE — 99285 EMERGENCY DEPT VISIT HI MDM: CPT

## 2019-01-01 PROCEDURE — 74011250636 HC RX REV CODE- 250/636: Performed by: EMERGENCY MEDICINE

## 2019-01-01 PROCEDURE — 80048 BASIC METABOLIC PNL TOTAL CA: CPT

## 2019-01-01 PROCEDURE — 74011000258 HC RX REV CODE- 258: Performed by: STUDENT IN AN ORGANIZED HEALTH CARE EDUCATION/TRAINING PROGRAM

## 2019-01-01 PROCEDURE — 93005 ELECTROCARDIOGRAM TRACING: CPT

## 2019-01-01 PROCEDURE — 82962 GLUCOSE BLOOD TEST: CPT

## 2019-01-01 PROCEDURE — 85025 COMPLETE CBC W/AUTO DIFF WBC: CPT

## 2019-01-01 PROCEDURE — 74011250637 HC RX REV CODE- 250/637: Performed by: STUDENT IN AN ORGANIZED HEALTH CARE EDUCATION/TRAINING PROGRAM

## 2019-01-01 PROCEDURE — 97165 OT EVAL LOW COMPLEX 30 MIN: CPT

## 2019-01-01 PROCEDURE — 97161 PT EVAL LOW COMPLEX 20 MIN: CPT

## 2019-01-01 PROCEDURE — 81001 URINALYSIS AUTO W/SCOPE: CPT

## 2019-01-01 PROCEDURE — 83605 ASSAY OF LACTIC ACID: CPT

## 2019-01-01 PROCEDURE — 83935 ASSAY OF URINE OSMOLALITY: CPT

## 2019-01-01 PROCEDURE — 36415 COLL VENOUS BLD VENIPUNCTURE: CPT

## 2019-01-01 PROCEDURE — 87040 BLOOD CULTURE FOR BACTERIA: CPT

## 2019-01-01 PROCEDURE — 87077 CULTURE AEROBIC IDENTIFY: CPT

## 2019-01-01 PROCEDURE — 74011250637 HC RX REV CODE- 250/637: Performed by: FAMILY MEDICINE

## 2019-01-01 PROCEDURE — 74011636320 HC RX REV CODE- 636/320: Performed by: FAMILY MEDICINE

## 2019-01-01 PROCEDURE — 83930 ASSAY OF BLOOD OSMOLALITY: CPT

## 2019-01-01 PROCEDURE — 74011250636 HC RX REV CODE- 250/636: Performed by: STUDENT IN AN ORGANIZED HEALTH CARE EDUCATION/TRAINING PROGRAM

## 2019-01-01 PROCEDURE — 87086 URINE CULTURE/COLONY COUNT: CPT

## 2019-01-01 PROCEDURE — 74011636637 HC RX REV CODE- 636/637: Performed by: STUDENT IN AN ORGANIZED HEALTH CARE EDUCATION/TRAINING PROGRAM

## 2019-01-01 PROCEDURE — 87186 SC STD MICRODIL/AGAR DIL: CPT

## 2019-01-01 PROCEDURE — 96365 THER/PROPH/DIAG IV INF INIT: CPT

## 2019-01-01 PROCEDURE — 74011250637 HC RX REV CODE- 250/637: Performed by: EMERGENCY MEDICINE

## 2019-01-01 PROCEDURE — 65660000000 HC RM CCU STEPDOWN

## 2019-01-01 PROCEDURE — 97535 SELF CARE MNGMENT TRAINING: CPT

## 2019-01-01 PROCEDURE — 74011000250 HC RX REV CODE- 250: Performed by: FAMILY MEDICINE

## 2019-01-01 PROCEDURE — 97116 GAIT TRAINING THERAPY: CPT

## 2019-01-01 PROCEDURE — 71045 X-RAY EXAM CHEST 1 VIEW: CPT

## 2019-01-01 PROCEDURE — 96366 THER/PROPH/DIAG IV INF ADDON: CPT

## 2019-01-01 PROCEDURE — 82010 KETONE BODYS QUAN: CPT

## 2019-01-01 PROCEDURE — 97530 THERAPEUTIC ACTIVITIES: CPT

## 2019-01-01 PROCEDURE — 84156 ASSAY OF PROTEIN URINE: CPT

## 2019-01-01 PROCEDURE — 80307 DRUG TEST PRSMV CHEM ANLYZR: CPT

## 2019-01-01 PROCEDURE — 85018 HEMOGLOBIN: CPT

## 2019-01-01 RX ORDER — MAGNESIUM SULFATE HEPTAHYDRATE 40 MG/ML
2 INJECTION, SOLUTION INTRAVENOUS
Status: COMPLETED | OUTPATIENT
Start: 2019-01-01 | End: 2019-01-01

## 2019-01-01 RX ORDER — ROPINIROLE 0.25 MG/1
0.5 TABLET, FILM COATED ORAL
Status: DISCONTINUED | OUTPATIENT
Start: 2019-01-01 | End: 2019-01-01 | Stop reason: HOSPADM

## 2019-01-01 RX ORDER — INSULIN LISPRO 100 [IU]/ML
2 INJECTION, SOLUTION INTRAVENOUS; SUBCUTANEOUS ONCE
Status: COMPLETED | OUTPATIENT
Start: 2019-01-01 | End: 2019-01-01

## 2019-01-01 RX ORDER — CEPHALEXIN 500 MG/1
500 CAPSULE ORAL 2 TIMES DAILY
Qty: 20 CAP | Refills: 0 | Status: SHIPPED | OUTPATIENT
Start: 2019-01-01 | End: 2019-01-01

## 2019-01-01 RX ORDER — POTASSIUM CHLORIDE 1.5 G/1.77G
20 POWDER, FOR SOLUTION ORAL 2 TIMES DAILY WITH MEALS
Qty: 60 PACKET | Refills: 0 | Status: SHIPPED | OUTPATIENT
Start: 2019-01-01 | End: 2019-01-01 | Stop reason: SDUPTHER

## 2019-01-01 RX ORDER — HYDROMORPHONE HYDROCHLORIDE 2 MG/ML
0.5 INJECTION, SOLUTION INTRAMUSCULAR; INTRAVENOUS; SUBCUTANEOUS ONCE
Status: COMPLETED | OUTPATIENT
Start: 2019-01-01 | End: 2019-01-01

## 2019-01-01 RX ORDER — IBUPROFEN 800 MG/1
800 TABLET ORAL
Qty: 30 TAB | Refills: 2 | Status: SHIPPED | OUTPATIENT
Start: 2019-01-01 | End: 2019-01-01 | Stop reason: SDUPTHER

## 2019-01-01 RX ORDER — DIPHENHYDRAMINE HCL 25 MG
50 CAPSULE ORAL
Status: DISCONTINUED | OUTPATIENT
Start: 2019-01-01 | End: 2019-01-01 | Stop reason: HOSPADM

## 2019-01-01 RX ORDER — MAGNESIUM SULFATE 100 %
4 CRYSTALS MISCELLANEOUS AS NEEDED
Status: DISCONTINUED | OUTPATIENT
Start: 2019-01-01 | End: 2019-01-01 | Stop reason: HOSPADM

## 2019-01-01 RX ORDER — SILVER SULFADIAZINE 10 G/1000G
CREAM TOPICAL
Qty: 50 G | Refills: 3 | Status: SHIPPED | OUTPATIENT
Start: 2019-01-01

## 2019-01-01 RX ORDER — DOCUSATE SODIUM 100 MG/1
100 CAPSULE, LIQUID FILLED ORAL
Status: DISCONTINUED | OUTPATIENT
Start: 2019-01-01 | End: 2019-01-01 | Stop reason: HOSPADM

## 2019-01-01 RX ORDER — POTASSIUM CHLORIDE 7.45 MG/ML
10 INJECTION INTRAVENOUS
Status: COMPLETED | OUTPATIENT
Start: 2019-01-01 | End: 2019-01-01

## 2019-01-01 RX ORDER — CEPHALEXIN 500 MG/1
500 CAPSULE ORAL 2 TIMES DAILY
Qty: 20 CAP | Refills: 0 | Status: SHIPPED | OUTPATIENT
Start: 2019-01-01 | End: 2019-01-01 | Stop reason: SDUPTHER

## 2019-01-01 RX ORDER — AMITRIPTYLINE HYDROCHLORIDE 50 MG/1
50 TABLET, FILM COATED ORAL
Status: DISCONTINUED | OUTPATIENT
Start: 2019-01-01 | End: 2019-01-01 | Stop reason: HOSPADM

## 2019-01-01 RX ORDER — INSULIN LISPRO 100 [IU]/ML
INJECTION, SOLUTION INTRAVENOUS; SUBCUTANEOUS
Status: DISCONTINUED | OUTPATIENT
Start: 2019-01-01 | End: 2019-01-01 | Stop reason: HOSPADM

## 2019-01-01 RX ORDER — AMITRIPTYLINE HYDROCHLORIDE 50 MG/1
TABLET, FILM COATED ORAL
Qty: 30 TAB | Refills: 5 | Status: SHIPPED | OUTPATIENT
Start: 2019-01-01 | End: 2019-01-01 | Stop reason: SDUPTHER

## 2019-01-01 RX ORDER — FAMOTIDINE 20 MG/1
20 TABLET, FILM COATED ORAL 2 TIMES DAILY
Qty: 60 TAB | Refills: 0 | Status: SHIPPED | OUTPATIENT
Start: 2019-01-01 | End: 2019-01-01 | Stop reason: SDUPTHER

## 2019-01-01 RX ORDER — DOCUSATE SODIUM 100 MG/1
100 CAPSULE, LIQUID FILLED ORAL DAILY
Status: DISCONTINUED | OUTPATIENT
Start: 2019-01-01 | End: 2019-01-01 | Stop reason: HOSPADM

## 2019-01-01 RX ORDER — ROPINIROLE 0.5 MG/1
0.5 TABLET, FILM COATED ORAL
Qty: 30 TAB | Refills: 5 | Status: SHIPPED | OUTPATIENT
Start: 2019-01-01 | End: 2019-01-01 | Stop reason: SDUPTHER

## 2019-01-01 RX ORDER — PIOGLITAZONEHYDROCHLORIDE 30 MG/1
30 TABLET ORAL DAILY
Qty: 30 TAB | Refills: 5 | Status: SHIPPED | OUTPATIENT
Start: 2019-01-01

## 2019-01-01 RX ORDER — POTASSIUM CHLORIDE 1.5 G/1.77G
40 POWDER, FOR SOLUTION ORAL 2 TIMES DAILY WITH MEALS
Status: COMPLETED | OUTPATIENT
Start: 2019-01-01 | End: 2019-01-01

## 2019-01-01 RX ORDER — FLUCONAZOLE 150 MG/1
150 TABLET ORAL DAILY
Qty: 2 TAB | Refills: 0 | Status: ON HOLD | OUTPATIENT
Start: 2019-01-01 | End: 2019-01-01

## 2019-01-01 RX ORDER — PEPPERMINT OIL
OIL (ML) MISCELLANEOUS ONCE
Status: COMPLETED | OUTPATIENT
Start: 2019-01-01 | End: 2019-01-01

## 2019-01-01 RX ORDER — PRAVASTATIN SODIUM 40 MG/1
40 TABLET ORAL DAILY
Qty: 30 TAB | Refills: 5 | Status: SHIPPED | OUTPATIENT
Start: 2019-01-01

## 2019-01-01 RX ORDER — SODIUM CHLORIDE 0.9 % (FLUSH) 0.9 %
5-40 SYRINGE (ML) INJECTION AS NEEDED
Status: DISCONTINUED | OUTPATIENT
Start: 2019-01-01 | End: 2019-01-01 | Stop reason: HOSPADM

## 2019-01-01 RX ORDER — OXYBUTYNIN CHLORIDE 5 MG/1
5 TABLET ORAL 4 TIMES DAILY
Status: DISCONTINUED | OUTPATIENT
Start: 2019-01-01 | End: 2019-01-01 | Stop reason: HOSPADM

## 2019-01-01 RX ORDER — PROMETHAZINE HYDROCHLORIDE 25 MG/1
25 TABLET ORAL
Qty: 30 TAB | Refills: 5 | Status: SHIPPED | OUTPATIENT
Start: 2019-01-01

## 2019-01-01 RX ORDER — MAGNESIUM SULFATE HEPTAHYDRATE 40 MG/ML
2 INJECTION, SOLUTION INTRAVENOUS ONCE
Status: DISCONTINUED | OUTPATIENT
Start: 2019-01-01 | End: 2019-01-01

## 2019-01-01 RX ORDER — NYSTATIN 100000 [USP'U]/G
POWDER TOPICAL 2 TIMES DAILY
Status: DISCONTINUED | OUTPATIENT
Start: 2019-01-01 | End: 2019-01-01 | Stop reason: HOSPADM

## 2019-01-01 RX ORDER — ERGOCALCIFEROL 1.25 MG/1
50000 CAPSULE ORAL
Qty: 12 CAP | Refills: 2 | Status: SHIPPED | OUTPATIENT
Start: 2019-01-01

## 2019-01-01 RX ORDER — NYSTATIN 100000 [USP'U]/G
POWDER TOPICAL 2 TIMES DAILY
Status: DISCONTINUED | OUTPATIENT
Start: 2019-01-01 | End: 2019-01-01

## 2019-01-01 RX ORDER — POTASSIUM CHLORIDE 1.5 G/1.77G
20 POWDER, FOR SOLUTION ORAL 2 TIMES DAILY WITH MEALS
Status: DISCONTINUED | OUTPATIENT
Start: 2019-01-01 | End: 2019-01-01 | Stop reason: HOSPADM

## 2019-01-01 RX ORDER — POTASSIUM CHLORIDE 1.5 G/1.77G
40 POWDER, FOR SOLUTION ORAL
Status: DISCONTINUED | OUTPATIENT
Start: 2019-01-01 | End: 2019-01-01

## 2019-01-01 RX ORDER — SODIUM CHLORIDE 9 MG/ML
96 INJECTION, SOLUTION INTRAVENOUS CONTINUOUS
Status: DISCONTINUED | OUTPATIENT
Start: 2019-01-01 | End: 2019-01-01 | Stop reason: HOSPADM

## 2019-01-01 RX ORDER — POTASSIUM CHLORIDE 7.45 MG/ML
10 INJECTION INTRAVENOUS
Status: DISPENSED | OUTPATIENT
Start: 2019-01-01 | End: 2019-01-01

## 2019-01-01 RX ORDER — DEXTROSE 50 % IN WATER (D50W) INTRAVENOUS SYRINGE
12.5-25 AS NEEDED
Status: DISCONTINUED | OUTPATIENT
Start: 2019-01-01 | End: 2019-01-01 | Stop reason: HOSPADM

## 2019-01-01 RX ORDER — SODIUM CHLORIDE 9 MG/ML
90 INJECTION, SOLUTION INTRAVENOUS CONTINUOUS
Status: DISPENSED | OUTPATIENT
Start: 2019-01-01 | End: 2019-01-01

## 2019-01-01 RX ORDER — LISINOPRIL 20 MG/1
20 TABLET ORAL DAILY
Status: DISCONTINUED | OUTPATIENT
Start: 2019-01-01 | End: 2019-01-01

## 2019-01-01 RX ORDER — POTASSIUM CHLORIDE 1.5 G/1.77G
40 POWDER, FOR SOLUTION ORAL
Status: COMPLETED | OUTPATIENT
Start: 2019-01-01 | End: 2019-01-01

## 2019-01-01 RX ORDER — POTASSIUM CHLORIDE 1.5 G/1.77G
20 POWDER, FOR SOLUTION ORAL
Qty: 90 PACKET | Refills: 0 | Status: SHIPPED | OUTPATIENT
Start: 2019-01-01

## 2019-01-01 RX ORDER — SODIUM CHLORIDE 0.9 % (FLUSH) 0.9 %
5-40 SYRINGE (ML) INJECTION EVERY 8 HOURS
Status: DISCONTINUED | OUTPATIENT
Start: 2019-01-01 | End: 2019-01-01 | Stop reason: HOSPADM

## 2019-01-01 RX ORDER — OXYCODONE AND ACETAMINOPHEN 5; 325 MG/1; MG/1
1 TABLET ORAL
Qty: 45 TAB | Refills: 0 | Status: SHIPPED | OUTPATIENT
Start: 2019-01-01 | End: 2019-01-01 | Stop reason: SDUPTHER

## 2019-01-01 RX ORDER — FAMOTIDINE 20 MG/1
20 TABLET, FILM COATED ORAL 2 TIMES DAILY
Status: DISCONTINUED | OUTPATIENT
Start: 2019-01-01 | End: 2019-01-01 | Stop reason: HOSPADM

## 2019-01-01 RX ORDER — FAMOTIDINE 20 MG/1
TABLET, FILM COATED ORAL
Qty: 60 TAB | Refills: 0 | Status: SHIPPED | OUTPATIENT
Start: 2019-01-01

## 2019-01-01 RX ORDER — POTASSIUM CHLORIDE 750 MG/1
40 TABLET, FILM COATED, EXTENDED RELEASE ORAL
Status: COMPLETED | OUTPATIENT
Start: 2019-01-01 | End: 2019-01-01

## 2019-01-01 RX ORDER — POTASSIUM CHLORIDE 750 MG/1
40 TABLET, FILM COATED, EXTENDED RELEASE ORAL
Status: DISCONTINUED | OUTPATIENT
Start: 2019-01-01 | End: 2019-01-01

## 2019-01-01 RX ORDER — MAGNESIUM SULFATE 1 G/100ML
1 INJECTION INTRAVENOUS ONCE
Status: DISCONTINUED | OUTPATIENT
Start: 2019-01-01 | End: 2019-01-01 | Stop reason: HOSPADM

## 2019-01-01 RX ORDER — POTASSIUM CHLORIDE 750 MG/1
20 TABLET, FILM COATED, EXTENDED RELEASE ORAL
Status: COMPLETED | OUTPATIENT
Start: 2019-01-01 | End: 2019-01-01

## 2019-01-01 RX ORDER — LISINOPRIL 20 MG/1
20 TABLET ORAL DAILY
Qty: 30 TAB | Refills: 5 | Status: SHIPPED | OUTPATIENT
Start: 2019-01-01

## 2019-01-01 RX ORDER — NYSTATIN 100000 U/G
CREAM TOPICAL 2 TIMES DAILY
Qty: 60 G | Refills: 2 | Status: ON HOLD | OUTPATIENT
Start: 2019-01-01 | End: 2019-01-01

## 2019-01-01 RX ORDER — OXYCODONE AND ACETAMINOPHEN 5; 325 MG/1; MG/1
1 TABLET ORAL
Status: DISCONTINUED | OUTPATIENT
Start: 2019-01-01 | End: 2019-01-01 | Stop reason: HOSPADM

## 2019-01-01 RX ORDER — MAGNESIUM SULFATE 1 G/100ML
1 INJECTION INTRAVENOUS ONCE
Status: COMPLETED | OUTPATIENT
Start: 2019-01-01 | End: 2019-01-01

## 2019-01-01 RX ORDER — DEXTROSE MONOHYDRATE AND SODIUM CHLORIDE 5; .9 G/100ML; G/100ML
90 INJECTION, SOLUTION INTRAVENOUS CONTINUOUS
Status: DISCONTINUED | OUTPATIENT
Start: 2019-01-01 | End: 2019-01-01

## 2019-01-01 RX ADMIN — OXYCODONE AND ACETAMINOPHEN 1 TABLET: 5; 325 TABLET ORAL at 17:26

## 2019-01-01 RX ADMIN — Medication 10 ML: at 22:04

## 2019-01-01 RX ADMIN — OXYBUTYNIN CHLORIDE 5 MG: 5 TABLET ORAL at 08:26

## 2019-01-01 RX ADMIN — OXYBUTYNIN CHLORIDE 5 MG: 5 TABLET ORAL at 17:14

## 2019-01-01 RX ADMIN — INSULIN LISPRO 3 UNITS: 100 INJECTION, SOLUTION INTRAVENOUS; SUBCUTANEOUS at 08:21

## 2019-01-01 RX ADMIN — Medication 10 ML: at 04:05

## 2019-01-01 RX ADMIN — MAGNESIUM SULFATE HEPTAHYDRATE 2 G: 40 INJECTION, SOLUTION INTRAVENOUS at 04:05

## 2019-01-01 RX ADMIN — OXYCODONE AND ACETAMINOPHEN 1 TABLET: 5; 325 TABLET ORAL at 13:30

## 2019-01-01 RX ADMIN — OXYBUTYNIN CHLORIDE 5 MG: 5 TABLET ORAL at 22:07

## 2019-01-01 RX ADMIN — ROPINIROLE HYDROCHLORIDE 0.5 MG: 0.25 TABLET, FILM COATED ORAL at 22:07

## 2019-01-01 RX ADMIN — OXYCODONE AND ACETAMINOPHEN 1 TABLET: 5; 325 TABLET ORAL at 07:14

## 2019-01-01 RX ADMIN — INSULIN LISPRO 2 UNITS: 100 INJECTION, SOLUTION INTRAVENOUS; SUBCUTANEOUS at 17:14

## 2019-01-01 RX ADMIN — HYDROMORPHONE HYDROCHLORIDE 0.5 MG: 2 INJECTION, SOLUTION INTRAMUSCULAR; INTRAVENOUS; SUBCUTANEOUS at 22:00

## 2019-01-01 RX ADMIN — POTASSIUM CHLORIDE 10 MEQ: 7.46 INJECTION, SOLUTION INTRAVENOUS at 04:01

## 2019-01-01 RX ADMIN — Medication 10 ML: at 08:13

## 2019-01-01 RX ADMIN — OXYCODONE AND ACETAMINOPHEN 1 TABLET: 5; 325 TABLET ORAL at 02:12

## 2019-01-01 RX ADMIN — INSULIN LISPRO 1 UNITS: 100 INJECTION, SOLUTION INTRAVENOUS; SUBCUTANEOUS at 22:38

## 2019-01-01 RX ADMIN — FAMOTIDINE 20 MG: 20 TABLET ORAL at 22:06

## 2019-01-01 RX ADMIN — OXYBUTYNIN CHLORIDE 5 MG: 5 TABLET ORAL at 08:10

## 2019-01-01 RX ADMIN — LISINOPRIL 20 MG: 20 TABLET ORAL at 08:20

## 2019-01-01 RX ADMIN — CEFTRIAXONE SODIUM 1 G: 1 INJECTION, POWDER, FOR SOLUTION INTRAMUSCULAR; INTRAVENOUS at 08:08

## 2019-01-01 RX ADMIN — OXYBUTYNIN CHLORIDE 5 MG: 5 TABLET ORAL at 13:31

## 2019-01-01 RX ADMIN — FAMOTIDINE 20 MG: 20 TABLET ORAL at 17:22

## 2019-01-01 RX ADMIN — FAMOTIDINE 20 MG: 20 TABLET ORAL at 08:20

## 2019-01-01 RX ADMIN — OXYCODONE AND ACETAMINOPHEN 1 TABLET: 5; 325 TABLET ORAL at 04:52

## 2019-01-01 RX ADMIN — POTASSIUM CHLORIDE 10 MEQ: 7.46 INJECTION, SOLUTION INTRAVENOUS at 06:13

## 2019-01-01 RX ADMIN — SODIUM CHLORIDE: 900 INJECTION, SOLUTION INTRAVENOUS at 08:16

## 2019-01-01 RX ADMIN — INSULIN LISPRO 2 UNITS: 100 INJECTION, SOLUTION INTRAVENOUS; SUBCUTANEOUS at 02:14

## 2019-01-01 RX ADMIN — NYSTATIN: 100000 POWDER TOPICAL at 08:23

## 2019-01-01 RX ADMIN — FAMOTIDINE 20 MG: 20 TABLET ORAL at 08:10

## 2019-01-01 RX ADMIN — OXYCODONE AND ACETAMINOPHEN 1 TABLET: 5; 325 TABLET ORAL at 20:59

## 2019-01-01 RX ADMIN — Medication 20 ML: at 16:36

## 2019-01-01 RX ADMIN — Medication 20 ML: at 13:43

## 2019-01-01 RX ADMIN — POTASSIUM CHLORIDE 10 MEQ: 10 INJECTION, SOLUTION INTRAVENOUS at 22:07

## 2019-01-01 RX ADMIN — OXYCODONE AND ACETAMINOPHEN 1 TABLET: 5; 325 TABLET ORAL at 00:51

## 2019-01-01 RX ADMIN — DOCUSATE SODIUM 100 MG: 100 CAPSULE, LIQUID FILLED ORAL at 08:10

## 2019-01-01 RX ADMIN — OXYCODONE AND ACETAMINOPHEN 1 TABLET: 5; 325 TABLET ORAL at 11:24

## 2019-01-01 RX ADMIN — OXYCODONE AND ACETAMINOPHEN 1 TABLET: 5; 325 TABLET ORAL at 22:07

## 2019-01-01 RX ADMIN — POTASSIUM CHLORIDE 10 MEQ: 7.46 INJECTION, SOLUTION INTRAVENOUS at 19:12

## 2019-01-01 RX ADMIN — POTASSIUM CHLORIDE 40 MEQ: 1.5 POWDER, FOR SOLUTION ORAL at 04:04

## 2019-01-01 RX ADMIN — SODIUM CHLORIDE 1000 ML: 900 INJECTION, SOLUTION INTRAVENOUS at 18:08

## 2019-01-01 RX ADMIN — AMITRIPTYLINE HYDROCHLORIDE 50 MG: 50 TABLET, FILM COATED ORAL at 22:10

## 2019-01-01 RX ADMIN — Medication 10 ML: at 22:07

## 2019-01-01 RX ADMIN — POTASSIUM CHLORIDE 20 MEQ: 1.5 POWDER, FOR SOLUTION ORAL at 13:41

## 2019-01-01 RX ADMIN — IOPAMIDOL 100 ML: 755 INJECTION, SOLUTION INTRAVENOUS at 20:35

## 2019-01-01 RX ADMIN — POTASSIUM CHLORIDE 40 MEQ: 1.5 POWDER, FOR SOLUTION ORAL at 17:22

## 2019-01-01 RX ADMIN — FAMOTIDINE 20 MG: 20 TABLET ORAL at 08:26

## 2019-01-01 RX ADMIN — MAGNESIUM SULFATE HEPTAHYDRATE 1 G: 1 INJECTION, SOLUTION INTRAVENOUS at 07:15

## 2019-01-01 RX ADMIN — LISINOPRIL 20 MG: 20 TABLET ORAL at 08:26

## 2019-01-01 RX ADMIN — POTASSIUM CHLORIDE 40 MEQ: 1.5 POWDER, FOR SOLUTION ORAL at 11:57

## 2019-01-01 RX ADMIN — INSULIN LISPRO 1 UNITS: 100 INJECTION, SOLUTION INTRAVENOUS; SUBCUTANEOUS at 22:15

## 2019-01-01 RX ADMIN — NYSTATIN: 100000 POWDER TOPICAL at 09:00

## 2019-01-01 RX ADMIN — POTASSIUM CHLORIDE 10 MEQ: 7.46 INJECTION, SOLUTION INTRAVENOUS at 18:00

## 2019-01-01 RX ADMIN — OXYBUTYNIN CHLORIDE 5 MG: 5 TABLET ORAL at 08:20

## 2019-01-01 RX ADMIN — FAMOTIDINE 20 MG: 20 TABLET ORAL at 17:14

## 2019-01-01 RX ADMIN — POTASSIUM CHLORIDE 20 MEQ: 1.5 POWDER, FOR SOLUTION ORAL at 17:13

## 2019-01-01 RX ADMIN — OXYBUTYNIN CHLORIDE 5 MG: 5 TABLET ORAL at 22:10

## 2019-01-01 RX ADMIN — POTASSIUM CHLORIDE 10 MEQ: 10 INJECTION, SOLUTION INTRAVENOUS at 00:00

## 2019-01-01 RX ADMIN — AMITRIPTYLINE HYDROCHLORIDE 50 MG: 50 TABLET, FILM COATED ORAL at 22:06

## 2019-01-01 RX ADMIN — DOCUSATE SODIUM 100 MG: 100 CAPSULE, LIQUID FILLED ORAL at 08:21

## 2019-01-01 RX ADMIN — OXYCODONE AND ACETAMINOPHEN 1 TABLET: 5; 325 TABLET ORAL at 00:00

## 2019-01-01 RX ADMIN — POTASSIUM CHLORIDE 20 MEQ: 750 TABLET, EXTENDED RELEASE ORAL at 04:01

## 2019-01-01 RX ADMIN — ROPINIROLE HYDROCHLORIDE 0.5 MG: 0.25 TABLET, FILM COATED ORAL at 22:04

## 2019-01-01 RX ADMIN — OXYCODONE AND ACETAMINOPHEN 1 TABLET: 5; 325 TABLET ORAL at 04:28

## 2019-01-01 RX ADMIN — Medication: at 16:36

## 2019-01-01 RX ADMIN — ROPINIROLE HYDROCHLORIDE 0.5 MG: 0.25 TABLET, FILM COATED ORAL at 22:09

## 2019-01-01 RX ADMIN — MAGNESIUM SULFATE HEPTAHYDRATE 2 G: 40 INJECTION, SOLUTION INTRAVENOUS at 06:00

## 2019-01-01 RX ADMIN — AMITRIPTYLINE HYDROCHLORIDE 50 MG: 50 TABLET, FILM COATED ORAL at 22:03

## 2019-01-01 RX ADMIN — OXYCODONE AND ACETAMINOPHEN 1 TABLET: 5; 325 TABLET ORAL at 19:39

## 2019-01-01 RX ADMIN — OXYBUTYNIN CHLORIDE 5 MG: 5 TABLET ORAL at 22:04

## 2019-01-01 RX ADMIN — DIPHENHYDRAMINE HYDROCHLORIDE 50 MG: 25 CAPSULE ORAL at 22:06

## 2019-01-01 RX ADMIN — POTASSIUM CHLORIDE 40 MEQ: 750 TABLET, EXTENDED RELEASE ORAL at 17:58

## 2019-01-01 RX ADMIN — POTASSIUM CHLORIDE 20 MEQ: 1.5 POWDER, FOR SOLUTION ORAL at 08:12

## 2019-01-01 RX ADMIN — POTASSIUM CHLORIDE 10 MEQ: 10 INJECTION, SOLUTION INTRAVENOUS at 23:13

## 2019-01-01 RX ADMIN — OXYBUTYNIN CHLORIDE 5 MG: 5 TABLET ORAL at 12:40

## 2019-01-01 RX ADMIN — OXYBUTYNIN CHLORIDE 5 MG: 5 TABLET ORAL at 17:22

## 2019-01-01 RX ADMIN — NYSTATIN: 100000 POWDER TOPICAL at 17:27

## 2019-01-01 RX ADMIN — POTASSIUM CHLORIDE 40 MEQ: 750 TABLET, EXTENDED RELEASE ORAL at 22:06

## 2019-01-01 RX ADMIN — Medication 10 ML: at 22:15

## 2019-01-01 RX ADMIN — NYSTATIN: 100000 POWDER TOPICAL at 23:12

## 2019-01-01 SDOH — ECONOMIC STABILITY - INCOME SECURITY: PROBLEM RELATED TO HOUSING AND ECONOMIC CIRCUMSTANCES, UNSPECIFIED: Z59.9

## 2019-01-03 NOTE — TELEPHONE ENCOUNTER
Faxed Px form to school nurse. Left dad a voicemail. Pt  calling to cx appt for today for med refill. States she has had nausea and vomiting since last night and does not want to leave the house. Would like to know what she can take to help stop this. Please call them back at [phone].

## 2019-01-03 NOTE — TELEPHONE ENCOUNTER
Pt states she is out of phenergan but did notice an improvement in sx while taking medication. Would like a refill sent to pharmacy.

## 2019-01-03 NOTE — TELEPHONE ENCOUNTER
Please check if pt is taking phenergan, she has an active prescription on her med list.     If this is not helping then we can change to Zofran.

## 2019-01-08 NOTE — PROGRESS NOTES
Chief Complaint Patient presents with  Medication Refill Pt. States, \"having problems holding urine and infection under right breast and groin. \" \"Concerned about dry yeast.\" 
 
Pt reports that for several months she has not been able to eat, everything comes back up. Pt reports less frequent but normal bowel movements. Pt reports that if she eats more than 2 bites it comes back up. Pt has been weak, needed to have her  and brother help care for her. [facility] today to follow up on chronic pain diagnosis. We discussed her non healing foot wound that is affecting her left foot. Significant changes since last visit: none. She is  able to do her normal daily activities. She reports the following adverse side effects: none. Least pain over the last week has been 7/10. Worst pain over the last week has been 10/10. Opioid Risk Tool Reviewed: YES Aberrant behaviors: None. Urine Drug Screen: due - order placed. Controlled substance agreement on file: YES.  reviewed:yes Pill count is consistent with her prescription: n/a Concomitant use of a benzodiazepine: no 
 
Subjective: (As above and below) Chief Complaint Patient presents with  Medication Refill  
 
she is a 59y.o. year old female who presents for evaluation. Reviewed PmHx, RxHx, FmHx, SocHx, AllgHx and updated in chart. Review of Systems - negative except as listed above Objective:  
 
Vitals:  
 01/08/19 1509 01/08/19 1533 BP: 92/66 (!) 15/49 Pulse: 87 Resp: 16 Temp: 97.3 °F (36.3 °C) TempSrc: Oral   
SpO2: 93% Height: 4' 9\" (1.448 m) Physical Examination: General appearance - alert, well appearing, and in no distress Mental status - normal mood, behavior, speech, dress, motor activity, and thought processes Mouth - mucous membranes moist, pharynx normal without lesions Chest - clear to auscultation, no wheezes, rales or rhonchi, symmetric air entry Heart - normal rate, regular rhythm, normal S1, S2, no murmurs, rubs, clicks or gallops Musculoskeletal - pt is in a wheelchair , has limited ability to stand for short period of time, socks on ly on both feet, wound on left with dressing in palce Skin - yeast rah under right breast, deeply erythematous with odor and white drainage Assessment/ Plan: 1. Uncontrolled type 2 diabetes mellitus with hyperglycemia ([facility] 
-check labs - METABOLIC PANEL, COMPREHENSIVE 
- CBC WITH AUTOMATED DIFF 
- AMB POC HEMOGLOBIN A1C 2. Cerebral palsy, unspecified type ([facility] -stable 3. Other chronic pain This is a chronic problem that is not changed. Per review of available records and patients , there are not sign of overuse, misuse, diversion, or concerning side effects. Today we reviewed: the risk of overdose, addiction, and dependency proper storage and disposal of medications the goals of treatment (improve functionality, quality of life, and pain) alternative treatment options including non-narcotic modalities the risks and benefits of continuing with a narcotic based pain regimen  The following changes were made to the patients current treatment plan: nothing, medications refilled. - AMB POC USCREEN 12 DRUG 4. Non-healing ulcer of left foot, unspecified ulcer stage ([facility] - AMB POC URINALYSIS DIP STICK AUTO W/O MICRO 5. Arterial insufficiency with ischemic ulcer ([facility] -needs to follow up with wound care Follow-up Disposition: As needed I have discussed the diagnosis with the patient and the intended plan as seen in the above orders. The patient has received an after-visit summary and questions were answered concerning future plans. Medication Side Effects and Warnings were discussed with patient: yes Patient Labs were reviewed: yes Patient Past Records were reviewed:  yes Laine Mcmahon M.D.

## 2019-01-08 NOTE — PROGRESS NOTES
Francisco Diallo is a 59 y.o. female Denied measuring weight. Pt. States, \"having problems holding urine and infection under right breast and groin. \" \"Concerned about dry yeast.\" Chief Complaint Patient presents with  Medication Refill 1. Have you been to the ER, urgent care clinic since your last visit? Hospitalized since your last visit? No 
 
2. Have you seen or consulted any other health care providers outside of the [address] [place] [address] since your last visit? Include any pap smears or colon screening. No 
 
Health Maintenance Due Topic Date Due  
 EYE EXAM RETINAL OR DILATED  09/24/1968  COLONOSCOPY  09/24/1976  Shingrix Vaccine Age 50> (1 of 2) 09/24/2008  BREAST CANCER SCRN MAMMOGRAM  09/24/2008  PAP AKA CERVICAL CYTOLOGY  06/19/2017  LIPID PANEL Q1  01/11/2019  
 FOOT EXAM Q1  01/12/2019  
 HEMOGLOBIN A1C Q6M  01/19/2019 Visit Vitals BP (!) 88/63 (BP 1 Location: Left arm, BP Patient Position: Sitting) Pulse 87 Temp 97.3 °F (36.3 °C) (Oral) Resp 16 Ht 4' 9\" (1.448 m) SpO2 93% BMI 28.56 kg/m²

## 2019-01-09 NOTE — TELEPHONE ENCOUNTER
[facility]. Patients potassium was 2.0.

## 2019-01-10 NOTE — TELEPHONE ENCOUNTER
Please call pt and advise that potassium was critically low, please advise pt to go to the ER immediately.

## 2019-01-10 NOTE — TELEPHONE ENCOUNTER
Call placed to pt x's 4. Phone rings once then immediately voice mail message responds. Message left to return the call to office as soon as possible.

## 2019-01-11 PROBLEM — E87.6 HYPOKALEMIA: Status: ACTIVE | Noted: 2019-01-01

## 2019-01-11 NOTE — ED PROVIDER NOTES
59 y.o. female with past medical history significant for acid indigestion, diabetes, hemorrhoids, stroke, PAD, cerebral palsy, polio, GERD, Crohn disease, hypertension, primary osteoarthritis, and arthritis who presents via EMS from home with chief complaint of low potassium. Patient was told by her PCP to be evaluated in the ED for hypokalemia, which she has had twice before. She complains of lethargy, appetite loss, and nausea. Of note, patient denies taking diuretics. There are no other acute medical concerns at this time. Social hx: Current tobacco use (1.5 packs/day for 38 years); denies alcohol use; denies illicit drug use PCP: Félix Lyons MD 
 
Note written by Aimee Richter, as dictated by Grazyna Martin MD 5:56 PM 
 
 
 
 
  
 
Past Medical History:  
Diagnosis Date  Acid indigestion   
 or heartburn  Arthritis   
 scince childhood  Cerebral palsy ([address]  Crohn disease ([address]  Diabetes ([address]  Financial difficulties 10/4/2012  GERD (gastroesophageal reflux disease)  Hemorrhoids  Hernia of unspecified site of abdominal cavity without mention of obstruction or gangrene  Hypertension  Mitral regurgitation 10/5/2011  Muscle spasm 6/19/2014  
 chronic  PAD (peripheral artery disease) ([address] 6/19/2014  Polio  Primary osteoarthritis of both hips 8/9/2012  
 Skin disease, bullous   
 frequent  Spastic paralysis ([address]  Stiff joint  Stroke ([address]  Tobacco abuse 8/10/2012  Urine incontinence   
 controlling  Weakness   
 of an arm or leg Past Surgical History:  
Procedure Laterality Date  HX APPENDECTOMY pt states surgery at 27 y.o  
[address]  
 hysterectomy  HX HEENT    
 toncillectomy childhood  HX ORTHOPAEDIC Bone transplants, tendons stretched, legs turned  HX TRANSPLANT 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  MUSCLE-SKIN FLAP,LEG  OR BONE MARROW HARVEST TRANSPLANTATION ALLOGENEIC Family History:  
Problem Relation Age of Onset  Diabetes Mother  Alcohol abuse Mother  Heart Disease Mother  Hypertension Mother  Cancer Father  Alcohol abuse Father  Diabetes Maternal Grandmother  Stroke Maternal Grandmother Social History Socioeconomic History  Marital status:  Spouse name: Not on file  Number of children: Not on file  Years of education: Not on file  Highest education level: Not on file Social Needs  Financial resource strain: Not on file  Food insecurity - worry: Not on file  Food insecurity - inability: Not on file  Transportation needs - medical: Not on file  Transportation needs - non-medical: Not on file Occupational History  Not on file Tobacco Use  Smoking status: Current Every Day Smoker Packs/day: 1.50 Years: 38.00 Pack years: 57.00  Smokeless tobacco: Former User Substance and Sexual Activity  Alcohol use: No  
 Drug use: No  
 Sexual activity: Yes  
  Partners: Male Other Topics Concern  Not on file Social History Narrative  Not on file ALLERGIES: Potassium; Anaprox [naproxen sodium]; Codeine; Mobic [meloxicam]; Nystatin; and Tylenol [acetaminophen] Review of Systems Constitutional: Positive for appetite change (decreased) and fatigue. Gastrointestinal: Positive for nausea. All other systems reviewed and are negative. Vitals:  
 01/11/19 1649 BP: 107/75 Pulse: (!) 103 Resp: 15 Temp: 98.2 °F (36.8 °C) SpO2: 98% Weight: 50.9 kg (112 lb 3.2 oz) Height: 4' 9\" (1.448 m) Physical Exam  
Constitutional: She appears well-developed and well-nourished. No distress. Weak HENT:  
Head: Normocephalic and atraumatic. Eyes: Conjunctivae are normal.  
Neck: Neck supple. Cardiovascular: Normal rate and regular rhythm. Pulmonary/Chest: Effort normal. No respiratory distress. Abdominal: She exhibits no distension. Musculoskeletal: Normal range of motion. She exhibits no deformity. Chronic muscle wasting in legs Neurological: She is alert. No cranial nerve deficit. Skin: Skin is warm and dry. There is pallor. Psychiatric: Her behavior is normal.  
Nursing note and vitals reviewed. MDM 
  
59 y.o. female presents with critically low hypokalemia. Repleted with IV and PO supplementation. Will require admission for telemetry monitoring with high risk for arrhythmia. Family medicine was consulted for admission and will see the patient in the emergency department. Procedures ED EKG interpretation: 
Rhythm: normal sinus rhythm; Rate (approx.): 99; ST/T wave: normal. 
Note written by Aimee Casillas, as dictated by Radha Washington MD 5:02 PM

## 2019-01-11 NOTE — TELEPHONE ENCOUNTER
Attempted to call both number in chart as well as emergency contact number, no answer and no option to leave a message. [email or web address] message has also been sent. Called and left message for NN to discuss other options to contact pt.

## 2019-01-11 NOTE — TELEPHONE ENCOUNTER
Pt called stating we sent a  to go to her house and check on her. I advised pt we have been trying to contact her regarding critical labs. Advised pt per [facility], she needs to go to ED ASAP. Asked pt if she wanted me to call EMS, but she refused. Pt states her  is going to take her to ED right now.

## 2019-01-11 NOTE — ED TRIAGE NOTES
Per EMS pt had blood drawn on 1/8 at PCP. Pt was called today due to low potassium and was told to come to ED.

## 2019-01-12 NOTE — PROGRESS NOTES
[address] [place] [address] [address] Office [phone] Fax [phone] Assessment and Plan Lesli Martinez is a 59 y.o. female w/ Crohn's, T2DM and GERD who is admitted for hypokalemia. 
  
  
Hypokalemia - K 1.6 POA. Mg 1.8. Possible related to lack of nutrition. Prolonged QTc. S/p 5 runs of IV 10 mEq K and 80 mEq of oral K. Will rule out renal potassium loss. K 2.5 this AM. 
- 2 x 10 mEq IV potassium, 20 mEq oral potassium now - BMP q4h 
- daily mag, phos 
- f/u urine potassium, osm 
- will continue to replete based on repeat BMP 
  
Weight loss - 20 pounds in 3 months. Unintentional. No colonoscopy in 10 years, no EGD in 35. Reports dark and pale stools. White count has been uptrending for months. CT chest/abd/pelv negative. Concern for malignancy. - Pt will need outpatient colonoscopy and EGD 
- FOBT to be collected in case of malignancy 
  
GERD - not well-controlled given persistent reflux. 
- hold home protonix as can lower K 
- start pepcid BID while inpatient 
  
JUDY - Cr on admission 1.06 (BL ~ 0.5). Will not give a lot of IVF because this may dilute. This AM 0.82 
  
Financial stressors - Pt commented that she cannot afford staying in hospitals. Reports active lawsuit against her due to not paying hospital bills. Pt desired to leave AMA, but decided to stay for critically low potassium overnight. 
- discuss w/ pt prior to ordering expensive studies 
  
Diabetes Mellitus T2 - Last HgA1c on 1/8/19 was 7.4. Most recent . 
- Holding home metformin, pioglitazone. - Will hold off on insulin for now to prevent K shift - Hypoglycemia protocols ordered. 
  
Hypertension - BP on admission 103/72. At this time, 119/85. 
- Continuing home medications of lisinopril. - Will continue to monitor at this time and readjust as BP's trend. 
  
Hyperlipidemia: Last lipid panel on 1/11/18 and values were Chol 182, .8, HDL 22,  
- She does not take her home pravastatin   
Crohn's - No colonoscopy in at least 10 years. No EGD in about 35 years. Noting dark stools, though none recently. Hgb is stable. - Not on any immunosuppressants  
- FOBT to be collected - Pt needs OP EGD and colonoscopy 
  
PAD/Ischemic foot ulcer - Pt w/ non-healing ischemic L foot ulcer. Pt refuses exam. 
- Continue home percocet 5-325 mg q4h PRN 
  
Constipation  
- continue home docusate 
  
Depression 
- continue home elavil 50 mg QHS 
  
Bladder incontinence - stable. - continue home oxybutynin 
  
RLS - stable 
- continue home ropinirole 
  
  
FEN/GI - No IVF. Diabetic diet. Activity - Ambulate with assistance DVT prophylaxis - SCDs (given dark stools) GI prophylaxis -  Not indicated Disposition - TBD. Consult to PT/OT. 
  
CODE STATUS:  DNR Pt to be discussed with Dr. Vince Ware, supervising physician. Oleg Jean MD 
Family Medicine Resident Subjective / Objective Subjective Pt explains that she hopes to leave today prior to the snowstorm. She denies CP, palpitations, SOB. Temp (24hrs), Av.2 °F (36.8 °C), Min:97.7 °F (36.5 °C), Max:98.6 °F (37 °C) Objective Respiratory:   O2 Device: Room air Visit Vitals /85 (BP 1 Location: Left arm, BP Patient Position: At rest) Pulse 92 Temp 97.7 °F (36.5 °C) Resp 16 Ht 4' 9\" (1.448 m) Wt 112 lb 3.2 oz (50.9 kg) SpO2 100% BMI 24.28 kg/m² General: No acute distress. Alert. Cooperative. Head: Normocephalic. Atraumatic. Respiratory: CTAB. No w/r/r/c.  
Cardiovascular: RRR. Normal S1,S2. No m/r/g. Pulses 2+ throughout. GI: + bowel sounds. Mildly tender to palpation of lower quadrants and LUQ. No rebound tenderness or guarding. Nondistended. Extremities: No LE edema. Musculoskeletal: Full ROM in all extremities. Refuses foot exam.  
Skin: Warm, dry. No rashes. I/O: 
Date 19 07 - 19 6978 19 07 - 19 5374 Shift 1495-2201 4942-0583 24 Hour Total 1330-0272 9566-7704 24 Hour Total  
INTAKE  
I.V.(mL/kg/hr) 0(0)  0 Volume (potassium chloride 10 mEq in 100 ml IVPB) 0  0 Shift Total(mL/kg) 0(0)  0(0) OUTPUT Urine(mL/kg/hr)  200 200 Urine Voided  200 200 Urine Occurrence(s)  1 x 1 x Shift Total(mL/kg)  200(3.9) 200(3.9) NET 0 -200 -200 Weight (kg) 50.9 50.9 50.9 50.9 50.9 50.9  
 
 
CBC: 
Recent Labs  
  01/12/19 
0022 01/11/19 
1704 WBC 17.2* 20.9* HGB 9.7* 11.5 HCT 28.2* 33.0*  
* 502* Metabolic Panel: 
Recent Labs  
  01/12/19 
0022 01/11/19 
2047 01/11/19 
1704  139 140  
K 2.5* 2.0* 1.6*  
* 105 104 CO2 17* 19* 19*  
BUN 29* 32* 34* CREA 0.82 0.90 1.06* * 184* 212* CA 8.1* 8.2* 9.2 MG  --   --  1.8 ALB 2.4*  --  2.8* SGOT 16  --  16 ALT 25  --  27 For Billing Chief Complaint Patient presents with  Abnormal Lab Results Hospital Problems  Date Reviewed: 1/8/2019 Codes Class Noted POA * (Principal) Hypokalemia ICD-10-CM: E87.6 ICD-9-CM: 276.8  1/11/2019 Unknown

## 2019-01-12 NOTE — PROGRESS NOTES
[facility]  
Senior Resident Admission Note CC: Hypokalemia HPI: 
Arlet Hampton is a 59 y.o. female with history of GERD, DM type 2, CA, PAD, cerebral palsy, polio, Crohn disease, hypertension, primary osteoarthritis, tobacco abuse who presents to the ER complaining for Hypokalemia. Patient reports over the last 2-3 months unable to tolerate most PO except for Jello and some fluids. Reports 50 lb weight loss in the last 3-4 months, 20 lb weight loss per records over the last 2 months. Patient noting moderate left and lower quadrant pain and tenderness. Denies diarrhea or intractable vomiting though has noted intermittent dark stools as well as pale stools, though not within the last several weeks. History of Crohns disease but has not had colonoscopy in years. Also with 45+ pack year smoking history. Patient is admitted for severe hypokalemia. Chart reviewed. ED Labs Potassium - 1.6. Magnesium - 1.8 Creatinine -1.06 (BL ~ 0.5-0.85) A/P: Patient is a 59 y.o. female with history of GERD, DM type 2, CA, PAD, cerebral palsy, polio, Crohn disease, hypertension, primary osteoarthritis, tobacco abuse who presents to the ER complaining for Hypokalemia. Hypokalemia: Likely related to nutritional deficit given history though exact etiology of this unkown. Further work up for other organic causes of Hypokalemia. - Admit to stepdown - Aggressive Potassium repletion while severe Hypokalemia (K< 2.5) - BMP Q4H 
- Daily Mag/Phos 
- Urine Potassium, Creatinine, Osmality/Serum Osmolality Weight Loss/Nausea: Concerning with significant recorded weight loss. Differential includes Crohns, malignancy, given time course less suspicion for gastroenteritis/infection. - CT Chest/Abd/Pelvis 
- FOBT with next stool JUDY: Creatinine elevated above baseline, likely due to poor PO. Given Hypokalemia though will not aggressive Fluid resuscitate, encourage PO. - BMP Insulin dependent Diabetes type 2: Given Severe hypokalemia will hold on Insulin until improved. - POC glucose checks ACHS 
- Monitor and will restart insulin as able. GERD: PPI can contribute to Hypokalemia, though unlikely primary cause. - Hold PPI, start Zantac Financial Stressors: Patient states there is lawsuit against her due to hospital collections. Patient initially wanting to leave AMA but convinced to stay for treatment and evaluation at least overnight. Discuss workup/treatment plans before pursuing if possible. Patient seen, examined, and discussed with Dr. Juan Plata (PGY-1). For the remaining assessment and plan of other medical problems please refer to Dr. Monalisa Dakin H&P for more details. Pt discussed with on-call attending physician Louisa Reece MD 
Family Medicine Resident

## 2019-01-12 NOTE — PROGRESS NOTES
Cristina Walton [address]  Office [phone] Fax [phone] Assessment and Plan Stacey Nowak is a 59 y.o. female w/ Crohn's, T2DM and GERD who is admitted for hypokalemia, weight loss, and dysphagia of unknown etiology. 
  
24 Hour Events: 
- Patient wanted to leave AMA but has agreed to stay until 1/15. - Per Nephro, urine K appropriate - Per GI, Barium swallow and UGI with SBFT to be done. - Overnight patient received dilaudid 0.5mg x 1 for chronic ischemic foot ulcer. ------------------------------------------------------  
Hypokalemia - IMPROVING. K 1.6 POA. Mg 1.8. Unknown etiology, possible related to lack of nutrition. UDS negative. Urine studies showed low urine K with low serum K, appropriate.  
- BMP q4h spaced out now to q8h with daily mag, phos - Replete K PRN, today 3.3, repleted. Mg 1.5, repleted. Phos 2.7, repleted. - Downgrading to telemetry 
  
Elevated anion gap metabolic acidosis - RESOLVED. VBG w pH 7.23, CO2 34, bicarb 14. Anion gap on admission 17. Lactic acid normal. Beta hydroxybutyrate normal. UA without ketones. Possibly due to starvation induced ketosis. - AG 14 today. Anemia, weight loss, and dysphagia w hx of Crohn's Disease - 20 pounds in 3 months, unintentional. No colonoscopy in 10 years, no EGD in 28. Reports intermittent black and pale stools. White count has been uptrending for months. CT chest/abd/pelv negative. Concern for malignancy. Not on  immunosuppressant. POA Hgb 11.5, baseline ~11-12.  
- Hgb 9.5 today, stable from yesterday 9.8.  
- Pt will need outpatient colonoscopy and EGD 
- FOBT ordered, patient refused rectal exam, will wait for patient to stool. - GI consulted, recommended barium swallow and UGI with SBFT. Upper GI series ordered for today. - Patient ate diabetic diet on 1/12 but reported difficulty swallowing - switching to clear liquid diet due to dysphagia concern.  Monitoring for refeeding syndrome.  
- Nutrition and SLP consulted   
 
 Asymptomatic Bacteruria with Chronic bladder incontinence - UA 1/12 with 30 protein, small LE, 3+ bacteria. Patient denies urinary symptoms.  
- Continue home oxybutynin 
  
GERD - not well-controlled given persistent reflux. 
- holding home protonix as can lower K 
- start pepcid BID while inpatient 
  
Financial stressors - Pt commented that she cannot afford staying in hospitals. Reports active lawsuit against her due to not paying hospital bills. Patient continues to desire leaving AMA, but has agreed to stay until 1/15 for treatment and evaluation. Patient declines meeting with case management and patient advocate. - discuss w/ pt prior to ordering expensive studies 
  
Diabetes Mellitus T2 - A1c 7.4 (1/8/19) 
- Holding home metformin, pioglitazone. - No basal insulin ordered at this time. SSI only. - Hypoglycemia protocols ordered. 
  
Hypertension   
- Continuing home lisinopril 20mg daily - Will continue to monitor at this time and readjust as BP's trend. At Risk JUDY Cr on admission 1.06 (BL ~ 0.5). RESOLVED.  
- Cr 0.70 
  
Hyperlipidemia: Chol 182, .8, HDL 22,  (1/11/18) - Noncompliant w home pravastatin 
  
PAD/Ischemic foot ulcer - Pt w/ non-healing ischemic L foot ulcer. Pt refuses exam. 
- Continue home percocet 5-325 mg q4h PRN Constipation  - continue home docusate 
  
Depression - continue home elavil 50 mg QHS 
  
RLS - stable. Continue home ropinirole  
  
FEN/GI -  Diabetic diet --> clear liquid diet. Activity - Ambulate with assistance DVT prophylaxis - SCDs (given dark stools) GI prophylaxis -  Pepcid Disposition - TBD. Consult to PT/OT. CODE STATUS:  DNR Pt to be discussed with Dr. Aurelio Waite, supervising physician. Dianna Nolan MD 
Family Medicine Resident Subjective / Objective Subjective Patient doing well, denies any CP, SOB, N or Vomiting.  States she ate all of her dinner last night, had \"some difficulty but I got it down. \" States her appetite has now returned and that dinner was delicious. Objective Respiratory:   O2 Device: Room air Visit Vitals BP 92/65 (BP 1 Location: Right arm, BP Patient Position: At rest) Pulse 79 Temp 98 °F (36.7 °C) Resp 14 Ht 4' 9\" (1.448 m) Wt 120 lb 14.4 oz (54.8 kg) SpO2 100% BMI 26.16 kg/m² General: No acute distress. Alert. Cooperative. Cachectic in appearance. Head: Normocephalic. Atraumatic. Respiratory: CTAB. No w/r/r/c.  
Cardiovascular: RRR. Normal S1,S2. No m/r/g. Pulses 2+ throughout. GI: + bowel sounds. Mildly tender to palpation of lower quadrants and LUQ. No rebound tenderness or guarding. Nondistended. Extremities: No LE edema. Musculoskeletal: Full ROM in all extremities. L foot wrapped in bandage with chronic venous skin changes. Skin: Warm, dry. No rashes. I/O: 
Date 01/11/19 0700 - 01/12/19 6530 01/12/19 0700 - 01/13/19 1431 Shift [id number] [id number] 24 Hour Total 0423-9214 [id number] 24 Hour Total  
INTAKE  
P.O.  240 240 120  120  
  P. O.  240 240 120  120  
I. V.(mL/kg/hr) 0(0) 200(0.3) 200(0.2) 100  100 Volume (magnesium sulfate 1 g/100 ml IVPB (premix or compounded))    100  100 Volume (potassium chloride 10 mEq in 100 ml IVPB) 0  0 Volume (potassium chloride 10 mEq in 100 ml IVPB)  200 200 Shift Total(mL/kg) 0(0) 440(8) 440(8) 220(4)  220(4) OUTPUT Urine(mL/kg/hr)  200(0.3) 200(0.2) 400  400 Urine Voided  200 200 400  400 Urine Occurrence(s)  2 x 2 x Shift Total(mL/kg)  200(3.6) 200(3.6) 400(7.3)  400(7.3) NET 0 240 240 -180  -180 Weight (kg) 50.9 54.8 54.8 54.8 54.8 54.8  
 
 
CBC: 
Recent Labs  
  01/12/19 
0950 01/12/19 
0022 01/11/19 
1704 WBC  --  17.2* 20.9* HGB 9.8* 9.7* 11.5 HCT 28.5* 28.2* 33.0*  
PLT  --  426* 502* Metabolic Panel: 
Recent Labs  
  01/12/19 
1541 01/12/19 
0950 01/12/19 
0451 01/12/19 0022  01/11/19 
1704  141 139 141   < > 140  
K 3.1* 3.0* 3.1* 2.5*   < > 1.6*  
* 111* 110* 109*   < > 104 CO2 17* 16* 11* 17*   < > 19* BUN 20 23* 26* 29*   < > 34* CREA 0.81 0.78 0.84 0.82   < > 1.06* * 188* 156* 174*   < > 212* CA 7.9* 7.6* 8.1* 8.1*   < > 9.2 MG  --   --   --   --   --  1.8 ALB  --   --   --  2.4*  --  2.8* SGOT  --   --   --  16  --  16 ALT  --   --   --  25  --  27  
 < > = values in this interval not displayed. For Billing Chief Complaint Patient presents with  Abnormal Lab Results Hospital Problems  Date Reviewed: 1/8/2019 Codes Class Noted POA * (Principal) Hypokalemia ICD-10-CM: E87.6 ICD-9-CM: 276.8  1/11/2019 Unknown Type 2 diabetes mellitus with diabetic neuropathy (HCC) ICD-10-CM: E11.40 ICD-9-CM: 250.60, 357.2  6/29/2016 Yes Overactive bladder ICD-10-CM: N32.81 ICD-9-CM: 596.51  6/29/2016 Yes Urinary incontinence ICD-10-CM: R32 
ICD-9-CM: 788.30  3/2/2016 Yes PAD (peripheral artery disease) (HCC) ICD-10-CM: I73.9 ICD-9-CM: 443.9  6/19/2014 Yes Crohn disease ([facility] ICD-10-CM: K50.90 ICD-9-CM: 555.9  6/29/2011 Yes Cerebral palsy ([facility] ICD-10-CM: G80.9 ICD-9-CM: 343.9  6/29/2011 Yes Uncontrolled type 2 diabetes mellitus with hyperglycemia (HCC) ICD-10-CM: E11.65 ICD-9-CM: 250.02  6/29/2011 Yes  
   
 TIA (transient ischemic attack) ICD-10-CM: G45.9 ICD-9-CM: 435.9  6/29/2011 Yes Overview Signed 6/29/2011  9:16 AM by Chris Arizmendi X 3 Migraines ICD-10-CM: F86.960 ICD-9-CM: 346.90  6/29/2011 Yes HTN (hypertension) ICD-10-CM: I10 
ICD-9-CM: 401.9  6/29/2011 Yes Elevated lipids ICD-10-CM: E78.5 ICD-9-CM: 272.4  6/29/2011 Yes

## 2019-01-12 NOTE — PROGRESS NOTES
Received order for ABG. Upon arrival in room patient states she was \"tired of people hurting her\" and refused procedure.

## 2019-01-12 NOTE — ED NOTES
8:36 PM 
TRANSFER - OUT REPORT: 
 
Verbal report given to Radu Underwood (name) on Baldemar  being transferred to  (unit) for routine progression of care Report consisted of patients Situation, Background, Assessment and  
Recommendations(SBAR). Information from the following report(s) SBAR, Kardex, ED Summary, MAR, Recent Results and Cardiac Rhythm NSR was reviewed with the receiving nurse. Lines:  
Peripheral IV 01/11/19 Right Antecubital (Active) Site Assessment Clean, dry, & intact 1/11/2019  4:53 PM  
Phlebitis Assessment 0 1/11/2019  4:53 PM  
Infiltration Assessment 0 1/11/2019  4:53 PM  
Dressing Status Clean, dry, & intact 1/11/2019  4:53 PM  
Dressing Type Transparent 1/11/2019  4:53 PM  
Hub Color/Line Status Pink 1/11/2019  4:53 PM  
  
 
Opportunity for questions and clarification was provided. Patient transported with: 
 Monitor Registered Nurse 7:50 PM ER attending, Family practice resident and this RN at bedside discussing patient concerns/needs for admission. After thorough discussion with patient, patient consents to staying overnight. Patient will need case management for financial/insurance assistance. 7:28 PM Bedside and Verbal shift change report given to KENDRA Mcclure RN (oncoming nurse) by ELODIA Leahy RN (offgoing nurse). Report included the following information SBAR, Kardex, ED Summary, MAR, Recent Results and Cardiac Rhythm ST. Patient does not want to be admitted to hospital, family practice residents at bedside talking to patient.

## 2019-01-12 NOTE — H&P
[facility]  
Admission H&P Date of admission: 1/11/2019 Patient name: Poli Shearer MRN: [id number] YOB: 1960 Age: 59 y.o. Primary care provider:  Tulio Lyons MD  
 
Source of Information: patient, medical records Chief complaint:  Low potassium History of Present Illness Poli Shearer is a 59 y.o. female w/ hx of Crohn's, polio, GERD, cerebral palsy, T2DM, HTN, HLD, PAD, RLS and a non-healing L foot ulcer who presents to the ER complaining of hypokalemia. She visited her PCP on 1/8/18 and was found to have a potassium of 2.0. Her PCP could not get in touch with her, so she sent a  to her house to bring her to the hospital.  
 
She reports that she has not been able to eat well for the past 2 months. She has been only able to consume about the amount that would \"fit in a thimble. \" She has only been able to tolerate is jello and liquids. She reports that anytime she tries to eat food, it comes right back up. Pt reports a 50 pound weight loss in the past few months, per chart review, she has lost about 20 pounds in the last 3 months. She has been consistently nauseous w/ frequent daily dry heaving, but has not had emesis. She has also been having diffuse abdominal pain for these two months that feels similar to a crohn's exacerbation. Her stools have appeared abnormal for the past few months as well. She is constipated, but when she is able to produce stools, occasionally they have been almost black and other times they have seemed white. The last time she noted red in her stool was Oct 2018. She has not had a colonoscopy in 10 years or a EGD in 28. She also reports that she has been sleeping constantly during this time period. In the ER, vital signs were unremarkable. Labs were remarkable for K of 1.6 and WBC of 20.9. She did not have any imagine.  She was treated with 3 runs of IV 10 meq potassium, and one dose of oral 40 meq potassium. Home Medications Prior to Admission medications Medication Sig Start Date End Date Taking? Authorizing Provider  
fluconazole (DIFLUCAN) 150 mg tablet Take 1 Tab by mouth daily. Repeat in 3 days if needed. 1/8/19   Tulio Lyons MD  
nystatin (MYCOSTATIN) topical cream Apply  to affected area two (2) times a day. 1/8/19   Tulio Lyons MD  
oxyCODONE-acetaminophen (PERCOCET) 5-325 mg per tablet Take 1 Tab by mouth every four (4) hours as needed for Pain. Max Daily Amount: 6 Tabs. MUST LAST 30 DAYS 1/8/19   Tulio Lyons MD  
promethazine (PHENERGAN) 25 mg tablet Take 1 Tab by mouth every six (6) hours as needed for Nausea. 1/6/19   Tulio Lyons MD  
oxybutynin (DITROPAN) 5 mg tablet TAKE ONE TABLET BY MOUTH 4 TIMES DAILY 12/28/18   Tulio Lyons MD  
rOPINIRole (REQUIP) 0.5 mg tablet Take 1 Tab by mouth nightly. 12/28/18   Tulio Lyons MD  
silver sulfADIAZINE (SSD) 1 % topical cream  APPLY TO AFFECTED AREA DAILY 12/28/18   Tulio Lyons MD  
amitriptyline (ELAVIL) 50 mg tablet TAKE 1 TABLET BY MOUTH ONCE DAILY AT NIGHT 12/28/18   Tulio Lyons MD  
ibuprofen (MOTRIN) 800 mg tablet Take 1 Tab by mouth every eight (8) hours as needed for Pain. 11/20/18   Tulio Lyons MD  
ciprofloxacin HCl (CIPRO) 500 mg tablet TAKE 1 TABLET BY MOUTH TWICE DAILY FOR 7 DAYS 10/15/18   Tulio Lyons MD  
pantoprazole (PROTONIX) 40 mg tablet Take 1 Tab by mouth daily. 8/4/18   Tulio Lyons MD  
potassium chloride (KLOR-CON) 10 mEq tablet Take 1 Tab by mouth daily. 7/21/18   Tulio Lyons MD  
pioglitazone (ACTOS) 30 mg tablet Take 1 Tab by mouth daily.  2/12/18   Tulio Lyons MD  
insulin aspart protamine/insulin aspart (NOVOLOG MIX 70-30) 100 unit/mL (70-30) injection 10 Units by SubCUTAneous route Before breakfast and dinner. Provider, Historical  
ondansetron (ZOFRAN ODT) 4 mg disintegrating tablet Take 1 Tab by mouth every eight (8) hours as needed for Nausea. 1/10/18   Adry Lyons MD  
diphenhydrAMINE (BENADRYL) 25 mg tablet Take 50 mg by mouth nightly as needed for Itching. 11/10/17   Adry Lyons MD  
lisinopril (PRINIVIL, ZESTRIL) 20 mg tablet TAKE ONE TABLET BY MOUTH ONCE DAILY 11/8/17   Adry Lyons MD  
pravastatin (PRAVACHOL) 40 mg tablet Take 1 Tab by mouth daily. 11/7/17   Adry Lyons MD  
metFORMIN (GLUCOPHAGE) 1,000 mg tablet Take 1 Tab by mouth two (2) times daily (with meals). 11/7/17   Adry Lyons MD  
docusate sodium (COLACE) 100 mg capsule Take 100 mg by mouth two (2) times daily as needed for Constipation. Provider, Historical  
fish oil-omega-3 fatty acids 340-1,000 mg capsule Take 1 Cap by mouth daily. Provider, Historical  
cholecalciferol (VITAMIN D3) 1,000 unit tablet Take 1,000 Units by mouth daily. Provider, Historical  
aspirin delayed-release (ASPIR-81) 81 mg tablet Take 1 Tab by mouth daily. 7/28/14   René Booker MD  
 
 
Allergies Allergies Allergen Reactions  Potassium Other (comments) Patient states she will refuse IV Potassium due to irritation-Patient will accept PO form.  Anaprox [Naproxen Sodium] Other (comments) Blood pressure drops Tolerates ibuprofen  Codeine Nausea and Vomiting Tolerates oxycodone/acetaminophen  Mobic [Meloxicam] Other (comments) Blood pressure drops Tolerates ibuprofen  Nystatin Other (comments) Cream: sepsis  Tylenol [Acetaminophen] Other (comments) \"Stomach hurts\", hx of Crohn's disease. Tolerates percocet. Past Medical History:  
Diagnosis Date  Acid indigestion   
 or heartburn  Arthritis   
 scince childhood  Cerebral palsy ([address]  Crohn disease ([address]  Diabetes ([facility]  Financial difficulties 10/4/2012  GERD (gastroesophageal reflux disease)  Hemorrhoids  Hernia of unspecified site of abdominal cavity without mention of obstruction or gangrene  Hypertension  Mitral regurgitation 10/5/2011  Muscle spasm 6/19/2014  
 chronic  PAD (peripheral artery disease) ([facility] 6/19/2014  Polio  Primary osteoarthritis of both hips 8/9/2012  
 Skin disease, bullous   
 frequent  Spastic paralysis ([facility]  Stiff joint  Stroke ([facility]  Tobacco abuse 8/10/2012  Urine incontinence   
 controlling  Weakness   
 of an arm or leg Past Surgical History:  
Procedure Laterality Date  HX APPENDECTOMY pt states surgery at 27 y.o  
[address]  
 hysterectomy  HX HEENT    
 toncillectomy childhood  HX ORTHOPAEDIC Bone transplants, tendons stretched, legs turned  HX TRANSPLANT 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  MUSCLE-SKIN FLAP,LEG    
 MA BONE MARROW HARVEST TRANSPLANTATION ALLOGENEIC Family History Problem Relation Age of Onset  Diabetes Mother  Alcohol abuse Mother  Heart Disease Mother  Hypertension Mother  Cancer Father  Alcohol abuse Father  Diabetes Maternal Grandmother  Stroke Maternal Grandmother Social History Patient resides Independently   
x  With family care - lives with  and brother Assisted living SNF Ambulates Independently With cane    
x  Assisted walker and wheelchair Alcohol history  
x  None Social  
  Chronic Smoking history None Former smoker  
x  Current smoker- smokes 1 to 1.5 PPD for the past 44 years Social History Tobacco Use Smoking Status Current Every Day Smoker  Packs/day: 1.50  Years: 38.00  
 Pack years: 57.00 Smokeless Tobacco Former User Drug history 
x  None Former drug user Current drug user Code status Full code x  DNR/DNI Partial   
Code status discussed with the patient/caregivers. Review of Systems Review of Systems Constitutional: Positive for malaise/fatigue and weight loss. Negative for chills, diaphoresis and fever. HENT: Negative for congestion. Eyes: Negative for pain and discharge. Respiratory: Negative for shortness of breath. Cardiovascular: Negative for chest pain, palpitations and leg swelling. Gastrointestinal: Positive for abdominal pain, blood in stool, constipation, heartburn, melena and nausea. Negative for diarrhea and vomiting. Genitourinary: Negative for dysuria and hematuria. Musculoskeletal: Positive for joint pain. Skin: Positive for itching. Negative for rash. Neurological: Negative for dizziness, sensory change, focal weakness, weakness and headaches. Physical Exam 
Visit Vitals /79 Pulse (!) 107 Temp 98.2 °F (36.8 °C) Resp 15 Ht 4' 9\" (1.448 m) Wt 112 lb 3.2 oz (50.9 kg) SpO2 100% BMI 24.28 kg/m² General: No acute distress. Alert. Head: Normocephalic. Atraumatic. Eyes:  Conjunctiva pink. Sclera white. PERRL. Nose:  Septum midline. Mucosa pink. No drainage. Throat: Mucosa pink. Moist mucous membranes. No tonsillar exudates or erythema. Palate movement equal bilaterally. Neck: Supple. Normal ROM. No stiffness. Respiratory: CTAB. No w/r/r/c.  
Cardiovascular: Tachycardic. Regular rhythm. Normal S1,S2. No m/r/g. Pulses 2+ throughout. GI: Hypoactive bowel sounds. Tender to palpation in lower quadrants bilaterally. No rebound tenderness or guarding. Nondistended. Extremities: No edema. No palpable cord. Musculoskeletal: No active ROM in feet or ankles. Pt refuses to remove socks for foot exam to evaluate L foot ulcer. Neuro: CN II-XII grossly intact. Strength 5/5 in upper extremities. 4/5 in proximal legs bilaterally. No strength in bilateral feet. Sensation intact in all extremities. Skin: Clear. No rashes. No ulcers. Laboratory Data Recent Results (from the past 24 hour(s)) SAMPLES BEING HELD Collection Time: 01/11/19  5:04 PM  
Result Value Ref Range SAMPLES BEING HELD SST.RED.GRN. BASSAM   
 COMMENT Add-on orders for these samples will be processed based on acceptable specimen integrity and analyte stability, which may vary by analyte. CBC WITH AUTOMATED DIFF Collection Time: 01/11/19  5:04 PM  
Result Value Ref Range WBC 20.9 (H) 3.6 - 11.0 K/uL  
 RBC 3.99 3.80 - 5.20 M/uL  
 HGB 11.5 11.5 - 16.0 g/dL HCT 33.0 (L) 35.0 - 47.0 % MCV 82.7 80.0 - 99.0 FL  
 MCH 28.8 26.0 - 34.0 PG  
 MCHC 34.8 30.0 - 36.5 g/dL  
 RDW 15.9 (H) 11.5 - 14.5 % PLATELET 436 (H) 380 - 400 K/uL MPV 8.9 8.9 - 12.9 FL  
 NRBC 0.0 0  WBC ABSOLUTE NRBC 0.00 0.00 - 0.01 K/uL NEUTROPHILS 73 32 - 75 % LYMPHOCYTES 18 12 - 49 % MONOCYTES 5 5 - 13 % EOSINOPHILS 2 0 - 7 % BASOPHILS 1 0 - 1 % IMMATURE GRANULOCYTES 1 (H) 0.0 - 0.5 % ABS. NEUTROPHILS 15.3 (H) 1.8 - 8.0 K/UL  
 ABS. LYMPHOCYTES 3.8 (H) 0.8 - 3.5 K/UL  
 ABS. MONOCYTES 1.0 0.0 - 1.0 K/UL  
 ABS. EOSINOPHILS 0.5 (H) 0.0 - 0.4 K/UL  
 ABS. BASOPHILS 0.1 0.0 - 0.1 K/UL  
 ABS. IMM. GRANS. 0.2 (H) 0.00 - 0.04 K/UL  
 DF AUTOMATED METABOLIC PANEL, COMPREHENSIVE Collection Time: 01/11/19  5:04 PM  
Result Value Ref Range Sodium 140 136 - 145 mmol/L Potassium 1.6 (LL) 3.5 - 5.1 mmol/L Chloride 104 97 - 108 mmol/L  
 CO2 19 (L) 21 - 32 mmol/L Anion gap 17 (H) 5 - 15 mmol/L Glucose 212 (H) 65 - 100 mg/dL BUN 34 (H) 6 - 20 MG/DL Creatinine 1.06 (H) 0.55 - 1.02 MG/DL  
 BUN/Creatinine ratio 32 (H) 12 - 20 GFR est AA >60 >60 ml/min/1.73m2 GFR est non-AA 53 (L) >60 ml/min/1.73m2 Calcium 9.2 8.5 - 10.1 MG/DL Bilirubin, total 0.2 0.2 - 1.0 MG/DL  
 ALT (SGPT) 27 12 - 78 U/L  
 AST (SGOT) 16 15 - 37 U/L Alk.  phosphatase 140 (H) 45 - 117 U/L  
 Protein, total 6.9 6.4 - 8.2 g/dL Albumin 2.8 (L) 3.5 - 5.0 g/dL Globulin 4.1 (H) 2.0 - 4.0 g/dL A-G Ratio 0.7 (L) 1.1 - 2.2 MAGNESIUM Collection Time: 01/11/19  5:04 PM  
Result Value Ref Range Magnesium 1.8 1.6 - 2.4 mg/dL Imaging 
none EKG: Normal sinus rhythm with prolonged QTc. Assessment and Plan Arslan Nair is a 59 y.o. female w/ Crohn's, T2DM and GERD who is admitted for hypokalemia. Hypokalemia - K 1.6 POA. Mg 1.8. Etiology unknown. Could be related to lack of nutrition given months without regular meals. Prolonged QTc on EKG. Pt on insulin, which can shift K into cells, which can exacerbate the condition. Pt takes daily potassium supplements. Asymptomatic. S/p 3 runs of IV 10 mEq K and 40 mEq of oral K. Will rule out renal potassium loss. - admit to stepdown 
- repeat BMP now - BMP q4h 
- daily mag, phos 
- f/u urine potassium, osm 
- give IV magnesium 
- will continue to replete based on repeat BMP Weight loss - 20 pounds in 3 months per chart review. Unintentional. Pt supposed to have colonoscopy yearly for Crohn's, but none for at least 10 years. Reports dark and pale stools. White count has been uptrending for months. Concern for malignancy. - CT chest/abd/pelv w/ contrast 
- FOBT 
 
GERD - not well-controlled given persistent reflux. 
- hold home protonix as can lower K 
- start pepcid BID while inpatient JUDY - Cr on admission 1.06 (BL ~ 0.5). Will not give a lot of IVF because this may dilute. GFR 53. Financial stressors - Pt commented that she cannot afford staying in hospitals. Reports active lawsuit against her due to not paying hospital bills. Pt desired to leave AMA, but decided to stay for critically low potassium overnight. 
- discuss w/ pt prior to ordering expensive studies Diabetes Mellitus T2 - Last HgA1c on 1/8/19 was 7.4.  
- Holding home metformin, pioglitazone. - Will hold off on insulin for now to prevent K shift - Hypoglycemia protocols ordered. Hypertension - BP on admission 103/72. At this time 104/84. 
- Continuing home medications of lisinopril. - Will continue to monitor at this time and readjust as BP's trend. Hyperlipidemia: Last lipid panel on 1/11/18 and values were Chol 182, .8, HDL 22,  
- She does not take her home pravastatin Crohn's - No colonoscopy in at least 10 years. No EGD in about 35 years. Noting dark stools, though none recently. Hgb is stable. - Not on any immunosuppressants  
- FOBT 
 
PAD/Ischemic foot ulcer - Pt w/ non-healing ischemic L foot ulcer. Pt refuses exam. 
- Continue home percocet 5-325 mg q4h PRN Constipation  
- continue home docusate Depression 
- continue home elavil 50 mg QHS Bladder incontinence - stable. - continue home oxybutynin RLS - stable 
- continue home ropinirole FEN/GI - No IVF. Diabetic diet. Activity - Ambulate with assistance DVT prophylaxis - SCDs (given dark stools) GI prophylaxis -  Not indicated Disposition - TBD. Consult to PT/OT. CODE STATUS:  DNR Patient discussed with Dr. Mandie Hameed, supervising physician. Bernice Cervantes MD 
Family Medicine Resident Hospital Problems Hospital Problems  Date Reviewed: 1/8/2019 Codes Class Noted POA * (Principal) Hypokalemia ICD-10-CM: E87.6 ICD-9-CM: 276.8  1/11/2019 Unknown

## 2019-01-12 NOTE — PROGRESS NOTES
[address] [place] [address] with [address] [facility] Resident Progress Note in Brief S:  Patient seen and examined at bedside with Dr. Rachelle Flores. Patient is currently declining ABG. She requests to leave as soon as possible against medical advice. All of the risks of her leaving including but not limited to arrhytmias, worsening electrolyte abnormalities and death were discussed. She accepted to get all labs except ABG and will leave at 2pm.  
 
 
O: 
Visit Vitals /73 (BP 1 Location: Left arm, BP Patient Position: Sitting) Pulse 94 Temp 97.7 °F (36.5 °C) Resp 13 Ht 4' 9\" (1.448 m) Wt 120 lb 14.4 oz (54.8 kg) SpO2 94% BMI 26.16 kg/m² Physical Examination:  
General appearance - alert and in no distress Chest - clear to auscultation, no wheezes, rales or rhonchi, symmetric air entry Heart - normal rate, regular rhythm, normal S1, S2, no murmurs, rubs, clicks or gallops, Abdomen - soft, tender to palpation difusely, , no masses or organomegaly Neurological - alert, oriented, normal speech, no focal findings Extremities - peripheral pulses normal, no pedal edema, no clubbing or cyanosis A/P:  
 
Mrs. Jossue Goldstein is a 19 y. o who was admitted for hypokalemia in the setting of starvation ketoacidosis. 1. AMA form 2. Potassium 40 mEq PO x2 
3. CMP, CBC, UDS, LA, VBG Hilario Zendejas MD 
Family Medicine Resident 
-------------------------------------------------------------------------------------------------------- 
ADDENDUM  
 
12:07 PM: 
 
Ciara Burger to patient's room with Dr. Charito Loza to go over [facility] form and once again discussed all the possible risks if she decides to leave. Patient insisted she understood all risk. Patient was told she would be free to go once she signed the form but [facility] staff could not physically help her leave.  Patient currently does not have her wheelchair in the hospital. Patient accepted to stay until Tuesday 1/15 in the hospital given she has no way of leaving.  We offered help with Patient Advocate and Case Management which she refuses and says they have not helped her in the past.

## 2019-01-12 NOTE — CONSULTS
Gastroenterology Consult Referring Physician: Janie Shrestha Consult Date: 1/12/2019 Subjective: Chief Complaint: dysphagia History of Present Illness: Cata Arroyo is a 59 y.o. female who is seen in consultation for dysphagia. Pt has been pt of [facility] for Crohns in past.Is said to have weight loss. She currently comes in due to weakness and was found to be hypokalemic. That is being corrected. She has trouble swallowing pills and CT on admission showed a patulous esophagus with \"enteric contents'. She wanted to leave AMA today because she says she cannot afford this hospital bill. She has not seen Dr. Miky eHrnandez for 10 yrs as she has no insurance. Her Crohns was in the small and large bowel. Past Medical History:  
Diagnosis Date  Acid indigestion   
 or heartburn  Arthritis   
 scince childhood  Cerebral palsy ([address]  Crohn disease ([address]  Diabetes ([address]  Financial difficulties 10/4/2012  GERD (gastroesophageal reflux disease)  Hemorrhoids  Hernia of unspecified site of abdominal cavity without mention of obstruction or gangrene  Hypertension  Mitral regurgitation 10/5/2011  Muscle spasm 6/19/2014  
 chronic  PAD (peripheral artery disease) ([address] 6/19/2014  Polio  Primary osteoarthritis of both hips 8/9/2012  
 Skin disease, bullous   
 frequent  Spastic paralysis ([address]  Stiff joint  Stroke ([address]  Tobacco abuse 8/10/2012  Urine incontinence   
 controlling  Weakness   
 of an arm or leg Past Surgical History:  
Procedure Laterality Date  HX APPENDECTOMY pt states surgery at 27 y.o  
[address]  
 hysterectomy  HX HEENT    
 toncillectomy childhood  HX ORTHOPAEDIC Bone transplants, tendons stretched, legs turned  HX TRANSPLANT 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  MUSCLE-SKIN FLAP,LEG    
 [place] BONE MARROW HARVEST TRANSPLANTATION ALLOGENEIC Family History Problem Relation Age of Onset  Diabetes Mother  Alcohol abuse Mother  Heart Disease Mother  Hypertension Mother  Cancer Father  Alcohol abuse Father  Diabetes Maternal Grandmother  Stroke Maternal Grandmother Social History Tobacco Use  Smoking status: Current Every Day Smoker Packs/day: 1.50 Years: 38.00 Pack years: 57.00  Smokeless tobacco: Former User Substance Use Topics  Alcohol use: No  
  
Allergies Allergen Reactions  Potassium Other (comments) Patient states she will refuse IV Potassium due to irritation-Patient will accept PO form.  Anaprox [Naproxen Sodium] Other (comments) Blood pressure drops Tolerates ibuprofen  Codeine Nausea and Vomiting Tolerates oxycodone/acetaminophen  Mobic [Meloxicam] Other (comments) Blood pressure drops Tolerates ibuprofen  Nystatin Other (comments) Cream: sepsis  Tylenol [Acetaminophen] Other (comments) \"Stomach hurts\", hx of Crohn's disease. Tolerates percocet. Current Facility-Administered Medications Medication Dose Route Frequency  potassium chloride (KLOR-CON) packet for solution 40 mEq  40 mEq Oral BID WITH MEALS  dextrose 5% and 0.9% NaCl infusion  90 mL/hr IntraVENous CONTINUOUS  
 [START ON 1/13/2019] docusate sodium (COLACE) capsule 100 mg  100 mg Oral DAILY  potassium chloride SR (KLOR-CON 10) tablet 40 mEq  40 mEq Oral NOW  peppermint oil   Other ONCE  
 insulin lispro (HUMALOG) injection   SubCUTAneous AC&HS  
 glucose chewable tablet 16 g  4 Tab Oral PRN  
 dextrose (D50W) injection syrg 12.5-25 g  12.5-25 g IntraVENous PRN  
 glucagon (GLUCAGEN) injection 1 mg  1 mg IntraMUSCular PRN  
 sodium chloride (NS) flush 5-40 mL  5-40 mL IntraVENous Q8H  
 sodium chloride (NS) flush 5-40 mL  5-40 mL IntraVENous PRN  
 amitriptyline (ELAVIL) tablet 50 mg  50 mg Oral QHS  diphenhydrAMINE (BENADRYL) capsule 50 mg  50 mg Oral QHS PRN  
  docusate sodium (COLACE) capsule 100 mg  100 mg Oral BID PRN  
 lisinopril (PRINIVIL, ZESTRIL) tablet 20 mg  20 mg Oral DAILY  oxybutynin (DITROPAN) tablet 5 mg  5 mg Oral QID  oxyCODONE-acetaminophen (PERCOCET) 5-325 mg per tablet 1 Tab  1 Tab Oral Q4H PRN  
 rOPINIRole (REQUIP) tablet 0.5 mg  0.5 mg Oral QHS  famotidine (PEPCID) tablet 20 mg  20 mg Oral BID Review of Systems: A detailed 10 organ review of systems is obtained with pertinent positives as listed in the History of Present Illness and Past Medical History. All others are negative. Objective:  
 
Physical Exam: 
Visit Vitals /59 Pulse 85 Temp 97.6 °F (36.4 °C) Resp 14 Ht 4' 9\" (1.448 m) Wt 54.8 kg (120 lb 14.4 oz) SpO2 99% BMI 26.16 kg/m² Skin:  Extremities and face reveal no rashes. No keller erythema. No telangiectasias on the chest wall. HEENT: Sclerae anicteric. Extra-occular muscles are intact. No oral ulcers. No abnormal pigmentation of the lips. The neck is supple. Cardiovascular: Regular rate and rhythm. No murmurs, gallops, or rubs. PMI nondisplaced. Carotids without bruits. Respiratory:  Comfortable breathing with no accessory muscle use. Clear breath sounds with no wheezes, rales, or rhonchi. GI:  Abdomen nondistended, soft, and with small hernia umbilicus. Normal active bowel sounds. No enlargement of the liver or spleen. No masses palpable. Rectal:  Deferred Musculoskeletal:  No pitting edema of the lower legs. Extremities have good range of motion. No costovertebral tenderness. Neurological:  Gross memory appears intact. Patient is alert and oriented. Psychiatric:  Mood appears appropriate with judgement intact. Lymphatic:  No cervical or supraclavicular adenopathy. Lab/Data Review: 
CMP:  
Lab Results Component Value Date/Time   01/12/2019 09:50 AM  
 K 3.0 (L) 01/12/2019 09:50 AM  
  (H) 01/12/2019 09:50 AM  
 CO2 16 (L) 01/12/2019 09:50 AM  
 AGAP 14 01/12/2019 09:50 AM  
  (H) 01/12/2019 09:50 AM  
 BUN 23 (H) 01/12/2019 09:50 AM  
 CREA 0.78 01/12/2019 09:50 AM  
 GFRAA >60 01/12/2019 09:50 AM  
 GFRNA >60 01/12/2019 09:50 AM  
 CA 7.6 (L) 01/12/2019 09:50 AM  
 MG 1.8 01/11/2019 05:04 PM  
 ALB 2.4 (L) 01/12/2019 12:22 AM  
 TP 5.8 (L) 01/12/2019 12:22 AM  
 GLOB 3.4 01/12/2019 12:22 AM  
 AGRAT 0.7 (L) 01/12/2019 12:22 AM  
 SGOT 16 01/12/2019 12:22 AM  
 ALT 25 01/12/2019 12:22 AM  
 
CBC:  
Lab Results Component Value Date/Time WBC 17.2 (H) 01/12/2019 12:22 AM  
 HGB 9.8 (L) 01/12/2019 09:50 AM  
 HCT 28.5 (L) 01/12/2019 09:50 AM  
  (H) 01/12/2019 12:22 AM  
 
 
 
Assessment/Plan: Dysphagia Crohns small and large bowel: untreated 10 years Would first do BaSw and ugi with sbft. Low k likely to active Crohns  If xray shows. Will see prn until xray done.

## 2019-01-12 NOTE — PROGRESS NOTES
Admission Medication Reconciliation: 
 
Comments/Recommendations: 
-Medication history obtained on the 3rd floor (room 321) -Reviewed medication list with patient's  in the ED while patient was getting CT scan and reviewed medication list with patient on 3rd floor. Patient did not take any medications prior to coming into the hospital including insulin. Interview with patient limited due to patient being in pain. Medications added: None Medications removed: Nystatin cream, silver sulfadiazine cream 
Medications changed: Insulin regimen Information obtained from: Patient, patient's , chart review, no RxQuery Significant PMH/Disease States:  
Past Medical History:  
Diagnosis Date  Acid indigestion   
 or heartburn  Arthritis   
 scince childhood  Cerebral palsy ([address]  Crohn disease ([address]  Diabetes ([address]  Financial difficulties 10/4/2012  GERD (gastroesophageal reflux disease)  Hemorrhoids  Hernia of unspecified site of abdominal cavity without mention of obstruction or gangrene  Hypertension  Mitral regurgitation 10/5/2011  Muscle spasm 6/19/2014  
 chronic  PAD (peripheral artery disease) ([address] 6/19/2014  Polio  Primary osteoarthritis of both hips 8/9/2012  
 Skin disease, bullous   
 frequent  Spastic paralysis ([address]  Stiff joint  Stroke ([address]  Tobacco abuse 8/10/2012  Urine incontinence   
 controlling  Weakness   
 of an arm or leg Chief Complaint for this Admission: Chief Complaint Patient presents with  Abnormal Lab Results Allergies:  Potassium; Anaprox [naproxen sodium]; Codeine; Mobic [meloxicam]; Nystatin; and Tylenol [acetaminophen] Prior to Admission Medications:  
Prior to Admission Medications Prescriptions Last Dose Informant Patient Reported? Taking?  
amitriptyline (ELAVIL) 50 mg tablet 1/10/2019 at bedtime  No Yes Sig: TAKE 1 TABLET BY MOUTH ONCE DAILY AT NIGHT aspirin delayed-release (ASPIR-81) 81 mg tablet 1/10/2019 at AM Self No Yes Sig: Take 1 Tab by mouth daily. cholecalciferol (VITAMIN D3) 1,000 unit tablet 1/10/2019 at AM Self Yes Yes Sig: Take 1,000 Units by mouth daily. diphenhydrAMINE (BENADRYL) 25 mg tablet 1/10/2019 at Unknown time  Yes Yes Sig: Take 50 mg by mouth nightly as needed for Itching. docusate sodium (COLACE) 100 mg capsule 1/10/2019 at Unknown time  Yes Yes Sig: Take 100 mg by mouth two (2) times daily as needed for Constipation. fish oil-omega-3 fatty acids 340-1,000 mg capsule 1/10/2019 at Unknown time Self Yes Yes Sig: Take 1 Cap by mouth daily. ibuprofen (MOTRIN) 800 mg tablet 1/10/2019 at Unknown time  No Yes Sig: Take 1 Tab by mouth every eight (8) hours as needed for Pain. insulin aspart protamine/insulin aspart (NOVOLOG MIX 70-30) 100 unit/mL (70-30) injection   Yes No  
Sig: 10 Units by SubCUTAneous route Before breakfast and dinner. lisinopril (PRINIVIL, ZESTRIL) 20 mg tablet 1/10/2019 at Unknown time  No Yes Sig: TAKE ONE TABLET BY MOUTH ONCE DAILY  
metFORMIN (GLUCOPHAGE) 1,000 mg tablet 1/10/2019 at Unknown time  No Yes Sig: Take 1 Tab by mouth two (2) times daily (with meals). ondansetron (ZOFRAN ODT) 4 mg disintegrating tablet 1/10/2019 at Unknown time  No Yes Sig: Take 1 Tab by mouth every eight (8) hours as needed for Nausea. oxyCODONE-acetaminophen (PERCOCET) 5-325 mg per tablet 1/11/2019 at Unknown time  No Yes Sig: Take 1 Tab by mouth every four (4) hours as needed for Pain. Max Daily Amount: 6 Tabs. MUST LAST 30 DAYS  
oxybutynin (DITROPAN) 5 mg tablet 1/10/2019 at Unknown time  No Yes Sig: TAKE ONE TABLET BY MOUTH 4 TIMES DAILY pantoprazole (PROTONIX) 40 mg tablet 1/10/2019 at Unknown time  No Yes Sig: Take 1 Tab by mouth daily. pioglitazone (ACTOS) 30 mg tablet 1/10/2019 at Unknown time  No Yes Sig: Take 1 Tab by mouth daily. potassium chloride (KLOR-CON) 10 mEq tablet 1/10/2019 at Unknown time  No Yes Sig: Take 1 Tab by mouth daily. pravastatin (PRAVACHOL) 40 mg tablet 1/10/2019 at Unknown time  No Yes Sig: Take 1 Tab by mouth daily. promethazine (PHENERGAN) 25 mg tablet 1/10/2019 at Unknown time  No Yes Sig: Take 1 Tab by mouth every six (6) hours as needed for Nausea. rOPINIRole (REQUIP) 0.5 mg tablet 1/10/2019 at Unknown time  No Yes Sig: Take 1 Tab by mouth nightly. Facility-Administered Medications: None Thank you, 
Prachi Reyes, PHARMD

## 2019-01-12 NOTE — PROGRESS NOTES
TRANSFER - IN REPORT: 
 
Verbal report received from JUNIOR Bell (name) on Baldemar  being received from ED (unit) for routine progression of care Report consisted of patients Situation, Background, Assessment and  
Recommendations(SBAR). Information from the following report(s) SBAR, Kardex, ED Summary, Intake/Output, MAR, Accordion, Recent Results, Med Rec Status and Cardiac Rhythm Tachycardia was reviewed with the receiving nurse. Opportunity for questions and clarification was provided. [id number]  Introduced self as primary RN. Assessment completed upon patients arrival to unit and care assumed. Pt c/o pain all over and has requested pain medication. Placed on bedpan. She was noticed to have 2 stage 2 pressure ulcers to the L buttocks. She states she has a foot ulcer which she covers and states she's had for about 5 years. She declines having wound care to assess her foot. VSS. Plan for the evening discussed with pt and she has verbalized understanding. Bed locked and in low position with call bell within reach.  Instructed pt to press call amanda when needed. White board updated with this RN's name.

## 2019-01-12 NOTE — PROGRESS NOTES
SHIFT CHANGE: 
7:50 AM Report received from Miguel Angel Meneses RN. SBAR, Kardex, Procedure Summary, Intake/Output, MAR, Recent Results, Med Rec Status and Cardiac Rhythm NSR, ST were discussed. SHIFT SUMMARY: 
8:20 AM  Patient refusing ABG. 8:30 AM Patient stating that she wants to go home today. Family practice made aware and will see patient shortly. 11:17 AM  Updated patient with lab results and plans to give Potassium packets. Patient refusing. MD made aware. 11:54 AM  Per MD, patient has agreed to stay until Tuesday. 11:58 AM  Patient agreed to take Potassium packets however she is really upset about it. 
 
1:32 PM  Patient complaining of a 8/10 stabbing pain in her left foot which she states is chronic. END OF SHIFT REPORT: 
 
7:36 PM  Bedside and Verbal shift change report given to Vicente Carrillo RN (oncoming nurse) by Nely Rodriguez RN (offgoing nurse). Report included the following information SBAR, Kardex, Procedure Summary, Intake/Output, MAR, Recent Results, Med Rec Status and Cardiac Rhythm NSR.

## 2019-01-13 NOTE — PROGRESS NOTES
Occupational Therapy EVALUATION/discharge Patient: Nanci Solorzano (89 y.o. female) Date: 1/13/2019 Primary Diagnosis: Hypokalemia Precautions:  DNR, Fall ASSESSMENT:  
Based on the objective data described below, the patient presents with generalized weakness and debility. Significant PMH to include crohns, polio, CP, L foot ulcer and hypokalemia. Patient initially refusing evaluation but after MD explained to patient benefits of having PT/OT evaluation patient finally agreed to participate. Patient required min A for bed mobility to remove bed pan and manage gown; refused to access or don socks prior to bed<>chair transfer at min Ax2. Patient argumentative and with limited insight to deficits and safety as evidenced by pulling up on RW. Per chart review she has a ramp to enter the house. . Patient benefits from sequencing of plan prior to execution to increase participation and compliance. Patient requires up to min A x 2 for functional mobility and refusing participation with ADL tasks stating she does not need OT services at this time. Patient left upright in chair with Activated chair alarm and notified nurse who agreed to monitor patient. . 
Further skilled acute occupational therapy is not indicated at this time. Discharge Recommendations: none Further Equipment Recommendations for Discharge: none SUBJECTIVE:  
Patient stated I can dress myself, I do not need OT. OBJECTIVE DATA SUMMARY:  
HISTORY:  
Past Medical History:  
Diagnosis Date  Acid indigestion   
 or heartburn  Arthritis   
 scince childhood  Cerebral palsy ([address]  [facility]  Diabetes ([facility]  Financial difficulties 10/4/2012  GERD (gastroesophageal reflux disease)  Hemorrhoids  Hernia of unspecified site of abdominal cavity without mention of obstruction or gangrene  Hypertension  Mitral regurgitation 10/5/2011  Muscle spasm 6/19/2014  
 chronic  PAD (peripheral artery disease) ([facility] 6/19/2014  Polio  Primary osteoarthritis of both hips 8/9/2012  
 Skin disease, bullous   
 frequent  Spastic paralysis ([facility]  Stiff joint  Stroke ([facility]  Tobacco abuse 8/10/2012  Urine incontinence   
 controlling  Weakness   
 of an arm or leg Past Surgical History:  
Procedure Laterality Date  HX APPENDECTOMY pt states surgery at 27 y.o  
[address]  
 hysterectomy  HX HEENT    
 toncillectomy childhood  HX ORTHOPAEDIC Bone transplants, tendons stretched, legs turned  HX TRANSPLANT 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  MUSCLE-SKIN FLAP,LEG    
 [place] BONE MARROW HARVEST TRANSPLANTATION ALLOGENEIC Prior Level of Function/Environment/Context: patient reports mod I with ADLs at [facility] level Expanded or extensive additional review of patient history:  
 
Home Situation Home Environment: Private residence Living Alone: No 
Support Systems: Spouse/Significant Other/Partner Patient Expects to be Discharged to[de-identified] Private residence Current DME Used/Available at Home: Blood pressure cuff, Walker, rolling EXAMINATION OF PERFORMANCE DEFICITS: 
Cognitive/Behavioral Status: 
Neurologic State: Alert;Irritable Orientation Level: Oriented X4 Cognition: Decreased command following;Poor safety awareness Perception: Appears intact Perseveration: No perseveration noted Safety/Judgement: Decreased awareness of need for assistance;Decreased awareness of need for safety;Decreased insight into deficits; Lack of insight into deficits Skin: sores on sacrum and internal thighs; RN aware Edema: none noted in the uppers Hearing: Auditory Auditory Impairment: None Vision/Perceptual:   
    
    
    
  
    
    
  
Range of Motion: Not formally assessed 2/2 non compliance; functionally observed during bed<>chair transfer and patient [facility] for  UE and hands AROM: Generally decreased, functional(pain) PROM: Generally decreased, functional(pain) Strength: 
Not formally assessed 2/2 non compliance; functionally observed during bed<>chair transfer and patient with functional strength in B UE 
Strength: Generally decreased, functional(pain) Coordination: 
Coordination: Generally decreased, functional 
Fine Motor Skills-Upper: Comment(functional; hx of CP) Gross Motor Skills-Upper: (functional wiht use of RW and bed mobility; hx of CP) Tone & Sensation: 
Tone: Abnormal  
Hx of polio and CP; patient would not elaborate or participate in formal testing despite education on benefits of OT Balance: 
Sitting: Intact; High guard Standing: Impaired; With support Standing - Static: Fair Standing - Dynamic : Fair Functional Mobility and Transfers for ADLs:Bed Mobility: 
Rolling: Minimum assistance Supine to Sit: Minimum assistance Sit to Supine: Minimum assistance Scooting: Minimum assistance Transfers: 
Sit to Stand: Minimum assistance Stand to Sit: Minimum assistance Bed to Chair: Minimum assistance Bathroom Mobility: Minimum assistance Toilet Transfer : Minimum assistance ADL Assessment: 
Feeding: Independent Oral Facial Hygiene/Grooming: Modified Independent Bathing: Minimum assistance Upper Body Dressing: Setup Lower Body Dressing: Minimum assistance;Setup Toileting: Minimum assistance ADL Intervention and task modifications:   Patient required min A for bed mobility to remove bed pan and manage gown; refused to access or don socks prior to bed<>chair transfer at min Ax2. Patient argumentative and with limited insight to deficits and safety as evidenced by pulling up on RW. Patient benefits from sequencing of plan prior to execution to increase participation and compliance.   
 
Therapeutic Activity:  
Patient requires up to min A x 2 for bed mobility and bed<>chair transfer with some safety concerns with use of RW; required placement of chair behind patient prior to lowering to seated Cognitive Retraining Safety/Judgement: Decreased awareness of need for assistance;Decreased awareness of need for safety;Decreased insight into deficits; Lack of insight into deficits Functional Measure: 
Barthel Index: 
 
Bathin Bladder: 5 Bowels: 5 Groomin Dressin Feeding: 10 Mobility: 0 Stairs: 0 Toilet Use: 5 Transfer (Bed to Chair and Back): 10 Total: 40 The Barthel ADL Index: Guidelines 1. The index should be used as a record of what a patient does, not as a record of what a patient could do. 2. The main aim is to establish degree of independence from any help, physical or verbal, however minor and for whatever reason. 3. The need for supervision renders the patient not independent. 4. A patient's performance should be established using the best available evidence. Asking the patient, friends/relatives and nurses are the usual sources, but direct observation and common sense are also important. However direct testing is not needed. 5. Usually the patient's performance over the preceding 24-48 hours is important, but occasionally longer periods will be relevant. 6. Middle categories imply that the patient supplies over 50 per cent of the effort. 7. Use of aids to be independent is allowed. Penelope Dorantes., Barthel, D.W. ([id number]). Functional evaluation: the Barthel Index. [facility] (14)2. DERIK Swenson, Magdy Lion[employer]Gen[employer] [place], [address] [place] [address] (). Measuring the change indisability after inpatient rehabilitation; comparison of the responsiveness of the Barthel Index and Functional [place] Measure. Journal of Neurology, Neurosurgery, and Psychiatry, 66(4), 226-560.  
Iveth Baker, N.J.A, YOANA Cote, Josue Trinidad MINA. (2004.) Assessment of post-stroke quality of life in cost-effectiveness studies: The usefulness of the Barthel Index and the EuroQoL-5D. [facility], 13, 339-66 Occupational Therapy Evaluation Charge Determination History Examination Decision-Making LOW Complexity : Brief history review  LOW Complexity : 1-3 performance deficits relating to physical, cognitive , or psychosocial skils that result in activity limitations and / or participation restrictions  MEDIUM Complexity : Patient may present with comorbidities that affect occupational performnce. Miniml to moderate modification of tasks or assistance (eg, physical or verbal ) with assesment(s) is necessary to enable patient to complete evaluation Based on the above components, the patient evaluation is determined to be of the following complexity level: LOW Pain: 
Pain Scale 1: Numeric (0 - 10) Pain Intensity 1: 10 
Pain Location 1: Generalized; Foot Pain Orientation 1: Left;Right Pain Description 1: Aching;Stabbing Pain Intervention(s) 1: Medication (see MAR) Activity Tolerance:  
Good Please refer to the flowsheet for vital signs taken during this treatment. After treatment:  
[x]  Patient left in no apparent distress sitting up in chair 
[]  Patient left in no apparent distress in bed 
[x]  Call bell left within reach [x]  Nursing notified 
[x]  Caregiver present [x]  Chair alarm activated COMMUNICATION/EDUCATION:  
Communication/Collaboration: 
[x]      Home safety education was provided and the patient/caregiver indicated understanding. [x]      Patient/family have participated as able and agree with findings and recommendations. []      Patient is unable to participate in plan of care at this time. Findings and recommendations were discussed with: Physical Therapist, Registered Nurse and Physician DEV Price/L Time Calculation: 31 mins

## 2019-01-13 NOTE — PROGRESS NOTES
Shift Summary 1930: Bedside and Verbal shift change report given to Abril Fitzgerald RN (oncoming nurse) by Sulema Díaz RN offgoing nurse). Report included the following information SBAR, Kardex, Procedure Summary, Intake/Output, MAR, Accordion, Recent Results and Cardiac Rhythm NSR.  
 
 
2200: Patient complaining of severe unresolved pain. Received one time order for prn dilaudid. Labs sent down- K 3.5 
 
2300: Received order for Nystatin powder for open yeasty rash under R breast and L side stomach fold. Nystatin cream listed under allergy list, patient reports that the cream gave her open blistered rash but she uses the powder regularly at home without issue. 0230: labs sent down- K 3.3 called Family Practice, received orders to reduce frequency of chem7 to q8.  
 
0600: Patient slept poorly overnight, complaining of pain. Continues to complain about pain this AM- encouraged rest and repositioning. No stool- occult stool test outstanding. 0730: Bedside and Verbal shift change report given to Sulema Díaz RN (oncoming nurse) by Abril Fitzgerald RN (offgoing nurse). Report included the following information SBAR, Kardex, Procedure Summary, Intake/Output, MAR, Accordion, Recent Results and Cardiac Rhythm NSR.

## 2019-01-13 NOTE — PROGRESS NOTES
SHIFT CHANGE: 
7:38 AM Report received from Tiffanie Zayas RN. SBAR, Kardex, Procedure Summary, Intake/Output, MAR, Recent Results, Med Rec Status and Cardiac Rhythm NSR were discussed. SHIFT SUMMARY: 
10:11 AM  Patient complaining about clear liquid diet. Family practice called and stated they would like to keep patient with clear liquids until we get the imaging done to see what's causing her dyphagia and difficulty eating. Potassium phosphate infusion running. BMP due now. Orders received to wait until infusion is completed to draw labs. 11:31 AM  PRN percocet given for 10/10 chronic leg pain. Patient complaining of clear liquid diet again. Explained to the patient again the Upper GI XR ordered and why family practice would like to keep her on a liquid diet until test is completed. Patient stating she doesn't want any more test and would like to go home. Family practice made aware. 1:00 PM  In to see patient who is sitting up in recliner eating chocolate ice cream.  Educated patient again about clear liquid diet orders to which patient rolled her eyes and kept eating the chocolate ice cream. 
 
 
END OF SHIFT REPORT: 
 
7:51 PM  Bedside and Verbal shift change report given to Tiffanie Zayas RN (oncoming nurse) by Piper Sharma RN (offgoing nurse). Report included the following information SBAR, Kardex, Procedure Summary, Intake/Output, MAR, Recent Results, Med Rec Status and Cardiac Rhythm NSR.

## 2019-01-13 NOTE — CONSULTS
RENAL Called re: hypokalemia. Apparently;y with decreased solute intake  related to inflammatory bowel disease. Urine K low along with low serum K suggests normal tubular fxn and the response to oral K supplementation is appropriate. Will recheck K in AM. If up further then no w/u needed. Laquita Liu, [address] [facility]

## 2019-01-13 NOTE — CONSULTS
[address] [facility] Mariella Lopez MR#: [id number] : 1960 ACCOUNT #: [de-identified] DATE OF SERVICE: 2019 ATTENDING PHYSICIAN:  Sigrid Willoughby MD 
 
REASON FOR CONSULTATION:  Hypokalemia. Thank you for allowing me to see this patient. HISTORY OF PRESENT ILLNESS:  This is a 69-year-old white female who has a history of adult onset diabetes mellitus, peripheral vascular disease with a chronic nonhealing left foot wound, history of GERD and also Crohn's disease. Apparently, she has not seen GI in about 10 years. She has a plethora of other conditions. She came in with a 50 pound weight loss in the last 4 months period of which 20 pounds of occurred fairly rapidly. She had some lower abdominal discomfort upon admission, but no nausea or vomiting. She was noted to have a potassium on admission of 1.6 associated with magnesium of 1.8 and a serum creatinine 1.06. She has been aggressively corrected with potassium. She has also received magnesium for subsequent development of hypomagnesemia. Of note, urinary potassiums were low, which is consistent with a total body potassium deficit. The constellation of electrolyte abnormalities suggest poor nutritional intake. Moreover, given the history of Crohn's disease without any subsequent followup, fairly significant smoking history and the unexplained weight loss is also suggest a specter of some malignancy. Nevertheless, she has received oral and parenteral potassium with serum potassium is trending upwards. Magnesium level today was 1.5. She received 2 g of IV magnesium. She has hypoalbuminemia with an increased gamma globulin fraction (may be related to her foot). Her creatinine corrected calcium is high normal. 
 
PAST MEDICAL HISTORY:  Extensive and reviewed in the [facility] chart.   Specifically, with focus on history of Crohn disease, GERD, peripheral artery disease, a mention of cerebral palsy with spastic paralysis, tobacco abuse with greater than 45-pack-year history of smoking, history of mitral regurgitation, hypertension, polio and arthritis since childhood. FAMILY HISTORY:  Remarkable for diabetes, heart disease, hypertension and cerebrovascular disease. SOCIAL HISTORY:  57-pack-year tobacco use, current smoker. No alcohol use. No illicit drug use. DRUG ALLERGIES:  INCLUDE INTOLERANCE OF NAPROXEN AND PARENTAL POTASSIUM AS WELL AS OTHER NONSTEROIDAL ANTI-INFLAMMATORY AGENTS. MEDICATIONS:  Elavil 50 mg daily, Pepcid 20 mg twice a day, lispro insulin sliding scale, lisinopril 20 mg daily, Ditropan 5 mg 4 times a day, phosphorus being administered IV, Requip. She had received oral potassium, but none is currently scheduled per MAR. PHYSICAL EXAMINATION: 
GENERAL:  She is a chronically ill-appearing 66-year-old white female who looks remarkably older than her stated age. VITAL SIGNS:  Blood pressure is 109/80 with a pulse of 95. HEENT:  Normocephalic, atraumatic cranium. Mild temporal wasting. Conjunctivae and sclerae are clear. Tongue is midline. NECK:  Supple, without JVD. LUNGS:  Clear. HEART:  Shows a regular rhythm. No S3 gallop or rub. ABDOMEN:  Soft with no active bowel sounds. EXTREMITIES:  Show atrophic lower extremity musculature. The left foot is bandaged and has a malodorous smell. NEUROLOGIC:  She is awake, alert and conversant, grossly intact, but gait was not tested. LABORATORY DATA:  Show a sodium of 141, potassium 3.3, chloride 109, CO2 of 18, BUN and creatinine of 17 and 0.7, calcium 8.1, phosphorus 2.7, magnesium 1.5 (just received magnesium IV), albumin 2.4, globulin 3.6. Lactic acid on admission was 0.9. Hematocrit is 27.9 with a hemoglobin of 9.5 and WBC of 16.7 without eosinophilia. Urine chemistries showed a urine potassium of 14, urine protein creatinine ratio 0.9.   Urine culture showed Klebsiella pneumoniae. Blood culture shows no growth. CT scan of abdomen and pelvis showed a fat containing umbilical hernia, degenerative changes to the spine. Nothing acute on CT evidence of cholecystectomy. IMPRESSION:  Hypokalemia with hypomagnesemia. This appeared to be related to poor solute intake. A low urine potassium consistent with appropriate tubular reabsorption of potassium. Magnesium defect has to be corrected to correct the hypokalemia. She probably has at least 100-150 mEq deficit, which needs to be given plus ongoing urinary losses. We need to maintain a normal magnesium for her to retain potassium. There is no evidence of diarrhea. She wants to eat and at this point, I think I would just give her a general diet and let her eat unless she has some problems with swallowing. Apparently, per the nurses, she has been eating very well, but now she is on a liquid diet. I think she will correct this with nutritional supplementation. We can give oral potassium which is probably more efficient than giving IV. Also, at this point, we can give oral phosphorus 2 in the form of Neutra-Phos as opposed to parental phosphorus. Thank you for allowing us to see this patient. MD HILTON Staley / APRIL 
D: 01/13/2019 12:10    
T: 01/13/2019 12:40 JOB #: [id number]

## 2019-01-13 NOTE — PROGRESS NOTES
[address] Medicine Residency Resident Note in Brief 
---------------------------------------- Notified by nursing that patient refusing to work with PT/OT, is very upset. Arrived to patient's room to find her sitting up, with PT/OT bedside, patient very upset, stating she was not aware that PT/OT were ordered for her and that she did not need any help at home. Explained to patient that as we discussed yesterday, she had agreed to stay for further evaluation and treatment of hypokalemia and that per our discussion yesterday she was very weak, wheelchair bound, unable to walk, and that we needed PT/OT evaluation to determine any recommendations for discharge as well as patient's mobility status while inpatient. Patient eventually agreed to work with PT but not OT as \"I can dress myself. \" She again expressed frustration that things were ordered for her without her knowledge. Told patient I would return after PT evaluation and we would discuss her plan of care in further detail. Pamela Woods MD 
11:30am 
 
--------------------------------- Returned to talk to patient. Patient sitting up in chair, eating ice cream, with  bedside. Discussed reason for patient being on clear liquid diet, reason for GI consult, reason for UGI w SBFT and the importance of these tests. Patient listened, asked questions, and she became quite pleasant. Was very appreciate of the conversation. Stated \"you will have no further problems from me\" and \"If I have to stay past Tuesday, that's okay. \" States she was not aware of the severity of her acid-base status and her hypokalemia and \"I didn't know that my heart could be affected by my low potassium. \" States \"I thought Dr. Monteiro Daughters could just give me an antibiotic and it would cure everything. \" Again discussed that prior to admission she had very little PO intake (\"it would fit in less than a thimble\") for several months, with weight loss and self-reported dysphagia with solid foods, and that if she is discharged without dysphagia workup her potassium will again become low. She expressed understanding. Patient was appreciate of PT eval, and admitted she had been weak at home and she would like to get stronger while she is in the hospital. Is agreeable to continued PT eval but states she does not need OT. Asked patient if the reason for poor PO intake was related to food scarcity - she denies, saying that is not an issue at home. States she and  live with her brother and they have enough food. States she simply was not hungry, was having significant nausea, and mostly slept throughout the day PTA. Hypokalemia - IMPROVED. - Will add daily KPhos 20mEq BID to start this afternoon per Nephro reccs as her renal function is normal and she is unlikely to become hyperkalemic - Repeat BMP to be drawn shortly after current KPhos finishes running - Magnesium being repleted, will continue trending daily Dysphagia - GI following - Upper GI series ordered, patient stated she would be compliant with this study - Currently on clear liquid diet - per nursing patient yesterday on diabetic diet ate 100% of every meal and was asking for additional trays. There is concern given her severe starvation ketosis on admission that significantly increased PO intake can precipitate refeeding syndrome.  
- CT on admission showed no explanation for hypokalemia but also stated, \"Mediastinum/jono: Slight patulous appearance of the esophagus with enteric content suggesting dysmotility or reflux. \" 
- Will keep clear liquid diet for now pending UGI series and if diet is advanced need to monitor closely for refeeding syndrome and replete Phos, continue cardiac monitoring.  
- Nursing to check when UGI is to be done, but likely tomorrow Spent over 35 minutes in patient's room.   
Karina North MD 
01/13/19 
12:47 PM

## 2019-01-13 NOTE — PROGRESS NOTES
Problem: Pressure Injury - Risk of 
Goal: *Prevention of pressure injury Document Balta Scale and appropriate interventions in the flowsheet. Outcome: Progressing Towards Goal 
Pressure Injury Interventions: 
Sensory Interventions: Assess changes in LOC, Float heels, Discuss PT/OT consult with provider, Keep linens dry and wrinkle-free, Minimize linen layers Moisture Interventions: Limit adult briefs, Minimize layers, Absorbent underpads Activity Interventions: Assess need for specialty bed, Pressure redistribution bed/mattress(bed type), PT/OT evaluation Mobility Interventions: Assess need for specialty bed, Float heels, PT/OT evaluation, Turn and reposition approx. every two hours(pillow and wedges) Nutrition Interventions: Document food/fluid/supplement intake, Offer support with meals,snacks and hydration Friction and Shear Interventions: Lift team/patient mobility team, Minimize layers, Transferring/repositioning devices Problem: Falls - Risk of 
Goal: *Absence of Falls Document Reino Horn Fall Risk and appropriate interventions in the flowsheet. Outcome: Progressing Towards Goal 
Fall Risk Interventions: 
  
 
  
 
Medication Interventions: Bed/chair exit alarm, Teach patient to arise slowly, Patient to call before getting OOB Elimination Interventions: Call light in reach, Bed/chair exit alarm, Toilet paper/wipes in reach

## 2019-01-13 NOTE — PROGRESS NOTES
[address] [facility], [address] with [facility] Resident Progress Note in Brief Went to patient's room to perform rectal exam to obtain sample for FOBT. Patient at this time rejected rectal examination. Patient will try to have a BM to acquire sample. RN is aware. Patient will be discussed with Dr. Alessandro Peters, Attending Physician Elaine Howe MD 
Family Medicine Resident

## 2019-01-13 NOTE — CONSULTS
[facility] Consult  note dictated, [id number]  . See Transcription tab if not in Consult section. I suspect hypokalemia and hypomag are related to poor solute intake. Given hx of Chrons and tobacco use along with marked weight loss brings spectre of malignancy. She wants to eat. There is no nause, vomitng or diarrhea. She is redusing Upper GI. Recc:  
liberalize diet to regular Oral KCL and PO4  
D/c lisinopril, her BP is low Lilly Mack MD 
[phone]

## 2019-01-13 NOTE — PROGRESS NOTES
Problem: Mobility Impaired (Adult and Pediatric) Goal: *Acute Goals and Plan of Care (Insert Text) Physical Therapy Goals Initiated 1/13/2019 1. Patient will move from supine to sit and sit to supine , scoot up and down and roll side to side in bed with modified independence within 7 day(s). 2.  Patient will transfer from bed to chair and chair to bed with modified independence using the least restrictive device within 7 day(s). 3.  Patient will perform sit to stand with modified independence within 7 day(s). 4.  Patient will ambulate with modified independence for 25 feet with the least restrictive device within 7 day(s). physical Therapy EVALUATION Patient: Unique Simpson (5 y.o. female) Date: 1/13/2019 Primary Diagnosis: Hypokalemia Precautions: fall ASSESSMENT : 
Based on the objective data described below, the patient presents with generalized weakness and debility. Patient initially refusing evaluation but after MD explained to patient benefits of having PT/OT evaluation patient finally agreed to participate. Patient was eval and discharge 1/12/2018 was non compliant with NWB on the left LE due to a wound, has a ramp to enter the house and patient does not want anything put on her foot. Just want to be bare footed at that time. Patient still the same does not want to wear any socks and needed to be brief with what going to happen step by step before participating. Sit on the edge of bed with min assist, sit to stand min assist, ambulate with rolling walker towards the recliner min assist. OOB to chair as tolerated performed some active range of motion on both LE all planes. Patient clamed down after assisting to the recliner. Educate and  Instructed patient to continue active range of motion exercise on both legs while up on chair or on bed. Activated chair alarm and notified nurse who agreed to monitor patient. Patient will benefit from skilled intervention to address the above impairments. Patients rehabilitation potential is considered to be Excellent Factors which may influence rehabilitation potential include:  
[]         None noted 
[x]         Mental ability/status [x]         Medical condition 
[x]         Home/family situation and support systems 
[x]         Safety awareness 
[]         Pain tolerance/management 
[]         Other: PLAN : 
Recommendations and Planned Interventions: 
[x]           Bed Mobility Training             []    Neuromuscular Re-Education 
[x]           Transfer Training                   []    Orthotic/Prosthetic Training 
[x]           Gait Training                         []    Modalities [x]           Therapeutic Exercises           []    Edema Management/Control 
[x]           Therapeutic Activities            []    Patient and Family Training/Education 
[]           Other (comment): Frequency/Duration: Patient will be followed by physical therapy  2 times a week to address goals. Discharge Recommendations: Home Health for safety eval 
Further Equipment Recommendations for Discharge: already has DME's SUBJECTIVE:  
Patient stated I do not need any therapy.  OBJECTIVE DATA SUMMARY:  
HISTORY:   
Past Medical History:  
Diagnosis Date  Acid indigestion   
 or heartburn  Arthritis   
 scince childhood  [facility]  Crohn disease ([facility]  Diabetes ([facility]  Financial difficulties 10/4/2012  GERD (gastroesophageal reflux disease)  Hemorrhoids  Hernia of unspecified site of abdominal cavity without mention of obstruction or gangrene  Hypertension  Mitral regurgitation 10/5/2011  Muscle spasm 6/19/2014  
 chronic  PAD (peripheral artery disease) ([address] 6/19/2014  Polio  Primary osteoarthritis of both hips 8/9/2012  
 Skin disease, bullous   
 frequent  Spastic paralysis ([address]  Stiff joint  Stroke ([facility]  Tobacco abuse 8/10/2012  Urine incontinence   
 controlling  Weakness   
 of an arm or leg Past Surgical History:  
Procedure Laterality Date  HX APPENDECTOMY pt states surgery at 27 y.o  
[address]  
 hysterectomy  HX HEENT    
 toncillectomy childhood  HX ORTHOPAEDIC Bone transplants, tendons stretched, legs turned  HX TRANSPLANT 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  MUSCLE-SKIN FLAP,LEG    
 [place] BONE MARROW HARVEST TRANSPLANTATION ALLOGENEIC Prior Level of Function/Home Situation: modified independent with rolling walker lives with  spouse has a ramp to enter. Personal factors and/or comorbidities impacting plan of care: EXAMINATION/PRESENTATION/DECISION MAKING: Critical Behavior: 
  
  
  
  
Hearing: 
  
Range Of Motion: 
AROM: Generally decreased, functional(pain) PROM: Generally decreased, functional(pain) Strength:   
Strength: Generally decreased, functional(pain) Tone & Sensation:  
  
  
  
  
  
  
  
  
  
   
Coordination: 
Coordination: Generally decreased, functional 
Vision:  
  
Functional Mobility: 
Bed Mobility: 
Rolling: Minimum assistance Supine to Sit: Minimum assistance Sit to Supine: Minimum assistance Scooting: Minimum assistance Transfers: 
Sit to Stand: Minimum assistance Stand to Sit: Minimum assistance Stand Pivot Transfers: Minimum assistance Bed to Chair: Minimum assistance Balance:  
Sitting: Intact; High guard Standing: Impaired; With support Standing - Static: Fair Standing - Dynamic : FairAmbulation/Gait Training:Distance (ft): 5 Feet (ft) Assistive Device: Walker, rolling;Gait belt Ambulation - Level of Assistance: Minimal assistance; Additional time Gait Description (WDL): Exceptions to [facility] Gait Abnormalities: Path deviations; Step to gait; Antalgic Base of Support: Narrowed Speed/Carmen: Slow;Fluctuations Step Length: Right shortened;Left shortened Therapeutic Exercises:  
 Instructed patient to continue active range of motion exercise on both legs while up on chair or on bed. Functional Measure: 
Barthel Index: 
 
Bathin Bladder: 10 Bowels: 10 
Groomin Dressin Feeding: 10 Mobility: 0 Stairs: 0 Toilet Use: 5 Transfer (Bed to Chair and Back): 10 Total: 50 The Barthel ADL Index: Guidelines 1. The index should be used as a record of what a patient does, not as a record of what a patient could do. 2. The main aim is to establish degree of independence from any help, physical or verbal, however minor and for whatever reason. 3. The need for supervision renders the patient not independent. 4. A patient's performance should be established using the best available evidence. Asking the patient, friends/relatives and nurses are the usual sources, but direct observation and common sense are also important. However direct testing is not needed. 5. Usually the patient's performance over the preceding 24-48 hours is important, but occasionally longer periods will be relevant. 6. Middle categories imply that the patient supplies over 50 per cent of the effort. 7. Use of aids to be independent is allowed. Padma Cisneros., Barthel, D.W. (). Functional evaluation: the Barthel Index. [facility] (14)2. DERIK Whitney, Maegan Person., Armen Navarro., [place], [address] [place] [address] (). Measuring the change indisability after inpatient rehabilitation; comparison of the responsiveness of the Barthel Index and Functional [place] Measure. Journal of Neurology, Neurosurgery, and Psychiatry, 66(4), 089-722. [place] Joy, N.J.A, YOANA Cote, & Robert Weller MINA. (2004.) Assessment of post-stroke quality of life in cost-effectiveness studies: The usefulness of the Barthel Index and the EuroQoL-5D. [facility], 13, 311-55 Physical Therapy Evaluation Charge Determination History Examination Presentation Decision-Making MEDIUM  Complexity : 1-2 comorbidities / personal factors will impact the outcome/ POC  MEDIUM Complexity : 3 Standardized tests and measures addressing body structure, function, activity limitation and / or participation in recreation  MEDIUM Complexity : Evolving with changing characteristics  Other outcome measures barthel  MEDIUM Based on the above components, the patient evaluation is determined to be of the following complexity level: MEDIUM Pain: 
Pain Scale 1: Numeric (0 - 10) Pain Intensity 1: 10 
Pain Location 1: Generalized; Foot Pain Orientation 1: Left;Right Pain Description 1: Aching;Stabbing Pain Intervention(s) 1: Medication (see MAR) Activity Tolerance:  
Good. Please refer to the flowsheet for vital signs taken during this treatment. After treatment:  
[x]         Patient left in no apparent distress sitting up in chair 
[]         Patient left in no apparent distress in bed 
[x]         Call bell left within reach [x]         Nursing notified 
[x]         Caregiver present [x]         Chair alarm activated COMMUNICATION/EDUCATION:  
The patients plan of care was discussed with: Occupational Therapist, Registered Nurse, Physician and patient. [x]         Fall prevention education was provided and the patient/caregiver indicated understanding. [x]         Patient/family have participated as able in goal setting and plan of care. [x]         Patient/family agree to work toward stated goals and plan of care. []         Patient understands intent and goals of therapy, but is neutral about his/her participation. []         Patient is unable to participate in goal setting and plan of care. Thank you for this referral. 
Harriet Fang, PT,WCC. Time Calculation: 28 mins

## 2019-01-14 NOTE — PROGRESS NOTES
Shift Summary 1930: Bedside and Verbal shift change report given to Angelito Carolina RN (oncoming nurse) by Afia Walters RN (offgoing nurse). Report included the following information SBAR, Kardex, Procedure Summary, Intake/Output, MAR, Accordion, Recent Results and Cardiac Rhythm NSR.  
 
2000:  Spoke to patient about her day, asked her about refusing xray swallow series today. She told me about how she did not like how she was not talked to about it before her and she hates it when doctors order things without explaining them and keeping her at the center of her own care. I talked to her about the purpose of the study and how it might relate to her nausea, weight loss, and low K, and about how delaying tests will delay her stay. She said that she had talked to the doctor and now is agreeable to have the test tomorrow. She seems in fairly good spirits this evening. 2200: Patient complaining of not feeling well, feeling like she has a temperature. Temp WDL. Adjusted the temperature in the room, helped patient to reposition. Will continue to monitor. Patient complaining of severe pain in legs and back. Pain is chronic, but patient is requesting IV dilaudid that she had been given yesterday evening. Discussed with FP resident Dr. Jeancarlos Dumont, no new pain medication orders. Discussed with patient that we would be keeping her on the same pain medication she was on at home. Patient did not argue. Got called to the patient's room by the PCT/. Patient's  had been walking in the hallway barefoot and his foot was now bleeding proffusely on the floor in the room, had blood all over his feet. Cleaned his feet, cleaned the floor, found that he had an open bleeding sore on his ankle. Bandaged this with gauze and tape and told him not to walk anywhere in his hopspital barefoot. 0000: Patient has a fever of 101, rechecked twice. Called Dr. Jeancarlos Dumont [facility]), awaiting orders. 0200: CXR completed 0300: AM labs sent, blood cultures sent. Resident said that this would suffice for her next chem7 check. Patient not able to void as of yet 7098: clean catch urine sample collected and sent to lab. 0630: Spoke to [facility] resident about D dimer lab that was ordered. Discussed possibility that this test is not indicated d/t patient showing no respiratory symptoms, perfect oxygenation, having a normal chest xray, clear lungs, no chest pain, and a clear probable cause for an elevated HR. Family Practice resident said she would evaluate the order and get back to me. 0730: Bedside and Verbal shift change report given to Matilda Lopez RN (oncoming nurse) by Sky Heath RN (offgoing nurse). Report included the following information SBAR, Kardex, Procedure Summary, Intake/Output, MAR, Accordion, Recent Results and Cardiac Rhythm NSR, Stach. 0800: Received call from xray asking if patient had been NPO, told her no NPO order had been placed, patient already eating breakfast. Patient upset when she heard this test takes 3 to 6 hours. Patient's  is confused this AM. Patient wants to leave AMA because she is her husbands (and her brother with mental issues) primary caregiver and she wants to take him to the doctor.

## 2019-01-14 NOTE — PROGRESS NOTES
[address] Medicine Residency Resident Note in Brief 
---------------------------------------- Notified by nursing that patient leaving AMA. Patient states her  needs to see a physician, that he is confused and does not feel well. Advised patient there is an ED downstairs - she states he cannot go down there without her, that she is his primary caretaker. Does not want to leave him for any period of time. Advised patient that she is leaving AMA and that the risks of leaving are resumption of electrolyte abnormalities, potential arrythmias, possibility of death. Discussed that we do not know etiology for her difficulty swallowing and that she needs further evaluation given her weight loss and smoking history. Also discussed new UTI, that she is currently receiving IV antibiotics, and potential for infection to worsen, spread to pyelo or bacteremia without antibiotics. Patient stated and expressed understanding and said she would talk to PCP. Pt and  are aware that no scripts will be provided and that staff cannot assist them in getting home. Patient has her wheelchair and  will drive them home. Recommended they go to ED for  to be evaluated, but patient declines and says he will be fine and they will follow up with Dr. Hilary Dos Santos. Patient declined to speak any further and requested [place] paperwork. AMA paperwork signed and placed in chart.   
 
Antwon Gandhi MD  
9:19 AM

## 2019-01-14 NOTE — PROGRESS NOTES
PCP BRITTANY appt scheduled with Dr. Nile Marquis on 1/17/2019 at 1:45pm Appt added to AVS. Virginie Martinez CM Specialist

## 2019-01-14 NOTE — PROGRESS NOTES
Cristina Walton [address]  Office [phone] Fax [phone] Assessment and Plan Yaritza Wyman is a 59 y.o. female w/ Crohn's, T2DM and GERD who is admitted for hypokalemia, weight loss, and dysphagia of unknown etiology. 
  
24 Hour Events: - Upper GI series not performed given pt refused at first. Accepts to do it today. - Had a fever of 101 at midnight  
 
------------------------------------------------------  
Hypokalemia - IMPROVING. K 1.6 POA. Mg 1.8. Unknown etiology, possible related to lack of nutrition. UDS negative. Urine studies showed low urine K with low serum K, appropriate.  
- BMP q4h spaced out now to q8h with daily mag, phos - Replete K PRN, today 3.3, repleted. Mg 1.5, repleted. Phos 2.7, repleted. Elevated anion gap metabolic acidosis - RESOLVED. VBG w pH 7.23, CO2 34, bicarb 14. Anion gap on admission 17. Lactic acid normal. Beta hydroxybutyrate normal. UA without ketones. Possibly due to starvation induced ketosis. - AG 14 today. Anemia, weight loss, and dysphagia w hx of Crohn's Disease - 20 pounds in 3 months, unintentional. No colonoscopy in 10 years, no EGD in 28. Reports intermittent black and pale stools. White count has been uptrending for months. CT chest/abd/pelv Mediastinum/jono: Slight patulous appearance of the esophagus with enteric content suggesting dysmotility or reflux. Concern for malignancy given Crohn's and tobacco use. Not on  immunosuppressant. POA Hgb 11.5, baseline ~11-12.  
- Hgb 9.5 today, stable from yesterday 9.8.  
- Pt will need outpatient colonoscopy and EGD 
- FOBT ordered, patient refused rectal exam, will wait for patient to stool. - GI consulted, recommended barium swallow and UGI with SBFT. Upper GI series ordered for today, refused yesterday. - Patient ate diabetic diet on 1/12 but reported difficulty swallowing - currently on full  liquid diet due to dysphagia concern.  Monitoring for refeeding syndrome.  
- Nutrition and SLP consulted - Speech consulted UTI- Patient complaining of dysuria, UA 1/12 with 30 protein, small LE, 3+ bacteria. Patient denies urinary symptoms.  
- Ucx: Klebsiella pneumoniae - Rocephin day 2 
 
 Chronic bladder incontinence -  
- Continue home oxybutynin 
  
GERD - not well-controlled given persistent reflux. 
- holding home protonix as can lower K 
- start pepcid BID while inpatient 
  
Financial stressors - Pt commented that she cannot afford staying in hospitals. Reports active lawsuit against her due to not paying hospital bills. Patient continues to desire leaving AMA, but has agreed to stay until 1/15 for treatment and evaluation. Patient declines meeting with case management and patient advocate. - discuss w/ pt prior to ordering expensive studies 
  
Diabetes Mellitus T2 - A1c 7.4 (1/8/19) 
- Holding home metformin, pioglitazone. - No basal insulin ordered at this time. SSI only. - Hypoglycemia protocols ordered. 
  
Hypertension   
- Continuing home lisinopril 20mg daily - Will continue to monitor at this time and readjust as BP's trend. At Risk JUDY Cr on admission 1.06 (BL ~ 0.5). RESOLVED.  
  
Hyperlipidemia: Chol 182, .8, HDL 22,  (1/11/18) - Noncompliant w home pravastatin 
  
PAD/Ischemic foot ulcer - Pt w/ non-healing ischemic L foot ulcer. Pt refuses exam. 
- Continue home percocet 5-325 mg q4h PRN Constipation  - continue home docusate 
  
Depression - continue home elavil 50 mg QHS 
  
RLS - stable. Continue home ropinirole  
  
FEN/GI -  Diabetic diet --> clear liquid diet--> full liquid Activity - Ambulate with assistance DVT prophylaxis - SCDs (given dark stools) GI prophylaxis -  Pepcid Disposition - Home health for safety eval. Consult to PT/OT. CODE STATUS:  DNR Pt to be discussed with Dr. Clare Watters, supervising physician. Mickey Rayo MD 
Family Medicine Resident Subjective / Objective Subjective Patient says she feels bad this morning. Currently complaining of abdominal pain and dysuria. Patient has not had a BM since admission. Objective: 
Vital signs: (most recent): Blood pressure 101/61, pulse (!) 103, temperature 98.1 °F (36.7 °C), resp. rate 20, height 4' 9\" (1.448 m), weight 124 lb 8 oz (56.5 kg), SpO2 95 %. Respiratory:   O2 Device: Room air Visit Vitals /61 (BP 1 Location: Right arm, BP Patient Position: Sitting) Pulse (!) 103 Temp 98.1 °F (36.7 °C) Resp 20 Ht 4' 9\" (1.448 m) Wt 124 lb 8 oz (56.5 kg) SpO2 95% BMI 26.94 kg/m² General: No acute distress. Alert. Cooperative. Cachectic in appearance. Head: Normocephalic. Atraumatic. Respiratory: CTAB. No w/r/r/c.  
Cardiovascular: RRR. Normal S1,S2. No m/r/g. Pulses 2+ throughout. GI: + bowel sounds. Diffusely tender to palpation. No rebound tenderness or guarding. Nondistended. Extremities: No LE edema. Musculoskeletal: Full ROM in all extremities. L foot wrapped in bandage with chronic venous skin changes. Patient refuses to let me remove bandage to assess wound. Skin: Warm, dry. No rashes. I/O: 
Date 01/12/19 1900 - 01/13/19 7259 01/13/19 0700 - 01/14/19 1997 Shift 6471-4809 24 Hour Total 8207-8184 6740-1433 24 Hour Total  
INTAKE  
[address] [place] V.(mL/kg/hr) 120 220 I.V. 120 120 Volume (magnesium sulfate 1 g/100 ml IVPB (premix or compounded))  100 Shift Total(mL/kg) 293(04.9) 5317(34.5) [id number])  1920(34) OUTPUT Urine(mL/kg/hr) 1150 1850 800(1.2)  800 Urine Voided 1150 1850 800  800 Urine Occurrence(s)  2 x 2 x  2 x Shift Total(mL/kg) 3279(04.5) 8125(80.3) [id number])  [id number]) NET -  1120 Weight (kg) 56.5 56.5 56.5 56.5 56.5  
 
 
CBC: 
Recent Labs  
  01/13/19 
0212 01/12/19 
0950 01/12/19 
0022 01/11/19 
1704 WBC 16.7*  --  17.2* 20.9* HGB 9.5* 9.8* 9.7* 11.5 HCT 27.9* 28.5* 28.2* 33.0*  
  --  426* 502* Metabolic Panel: 
Recent Labs  
  01/13/19 
1307 01/13/19 
0212 01/12/19 
2055  01/12/19 
0022  01/11/19 
1704  141 140   < > 141   < > 140  
K 3.7 3.3* 3.5   < > 2.5*   < > 1.6*  
* 109* 108   < > 109*   < > 104 CO2 16* 18* 18*   < > 17*   < > 19* BUN 13 17 18   < > 29*   < > 34* CREA 0.69 0.70 0.70   < > 0.82   < > 1.06* * 183* 205*   < > 174*   < > 212* CA 7.8* 8.1* 7.9*   < > 8.1*   < > 9.2 MG  --  1.5*  --   --   --   --  1.8 PHOS  --  2.7  --   --   --   --   --   
ALB  --  2.4*  --   --  2.4*  --  2.8* SGOT  --  13*  --   --  16  --  16 ALT  --  25  --   --  25  --  27  
 < > = values in this interval not displayed. For Billing Chief Complaint Patient presents with  Abnormal Lab Results Hospital Problems  Date Reviewed: 1/8/2019 Codes Class Noted POA * (Principal) Hypokalemia ICD-10-CM: E87.6 ICD-9-CM: 276.8  1/11/2019 Unknown Type 2 diabetes mellitus with diabetic neuropathy (HCC) ICD-10-CM: E11.40 ICD-9-CM: 250.60, 357.2  6/29/2016 Yes Overactive bladder ICD-10-CM: N32.81 ICD-9-CM: 596.51  6/29/2016 Yes Urinary incontinence ICD-10-CM: R32 
ICD-9-CM: 788.30  3/2/2016 Yes PAD (peripheral artery disease) (HCC) ICD-10-CM: I73.9 ICD-9-CM: 443.9  6/19/2014 Yes Crohn disease ([facility] ICD-10-CM: K50.90 ICD-9-CM: 555.9  6/29/2011 Yes Cerebral palsy ([address] ICD-10-CM: G80.9 ICD-9-CM: 343.9  6/29/2011 Yes Uncontrolled type 2 diabetes mellitus with hyperglycemia (HCC) ICD-10-CM: E11.65 ICD-9-CM: 250.02  6/29/2011 Yes  
   
 TIA (transient ischemic attack) ICD-10-CM: G45.9 ICD-9-CM: 435.9  6/29/2011 Yes Overview Signed 6/29/2011  9:16 AM by Katherine Dang X 3 Migraines ICD-10-CM: O67.460 ICD-9-CM: 346.90  6/29/2011 Yes HTN (hypertension) ICD-10-CM: I10 
ICD-9-CM: 401.9  6/29/2011 Yes Elevated lipids ICD-10-CM: E78.5 ICD-9-CM: 272.4  6/29/2011 Yes

## 2019-01-14 NOTE — PROGRESS NOTES
[address] [facility] with [address] [facility] Resident Progress Note in Brief S: RN notified of fever 101. Patient seen and examined at bedside. Patient states that she feels unwell and warm, but no one area that bothers her. She feels pain all over, which is chronic for her. Denies dizziness, vision changes, chest pain, difficulty breathing, coughing, sneezing, difficulty eating, urinary changes, or abdominal pain. O: 
Visit Vitals /67 (BP 1 Location: Right arm, BP Patient Position: Sitting) Pulse (!) 109 Temp (!) 101 °F (38.3 °C) Resp 18 Ht 4' 9\" (1.448 m) Wt 124 lb 8 oz (56.5 kg) SpO2 100% BMI 26.94 kg/m² Physical Examination:  
General appearance - alert, well appearing, and in no distress Chest - clear to auscultation, no wheezes, rales or rhonchi, symmetric air entry Heart - Tachycardic, regular rhythm. Abdomen - Hernia. Soft. NTTP. Neurological - alert, oriented, normal speech, no focal findings Extremities - R ankle wrapped. Not tender to palpation in bilateral lower extremities, no pedal edema A/P: 58yoF admitted for hypokalemia. This is hospital day 3.   
 
2/4SIRS criteria with tachycardia and fever. Given patient CT on admission suggestive of dysmotility, there is concern for aspiration. Patient has been eating her meals with no complaints, no coughing. Will get CXR, UA, and blood cultures. Please see daily progress note for full details. Trevin Millard MD 
Family Medicine Resident

## 2019-01-14 NOTE — PROGRESS NOTES
[facility], [address] with [address] [facility] Resident Progress Note in Brief S: Patient left AMA. Patient signed AMA form with Dr. Maximo Austin that had been discussed  multiple times. She understands electrolyte abnormalities will worsen given etiology is unknown, and this may lead to serious consequences including but not limited to arrhythmia and death. Patient's  helped patient leave in wheelchair before scripts were given to patient. Mrs. Vanna Benjamin is a 59 y.o Female who was admitted for hypokalemia in the setting of weight loss and Crohn's disease.  
- Stop taking pantoprazole given it worsens hypokalemia.  
- Start taking Famotidine instead of pantoprazole. - Start taking potassium chloride (klor-con) packet two times a day daily.  
- Start taking keflex 500 mg capsule BID for 10 days. Patient will be discussed with Dr. Mirna Zhu, Attending Physician. Kwadwo Swanson MD 
Family Medicine Resident

## 2019-01-14 NOTE — PROGRESS NOTES
Pt is leaving AMA. Notified APS of concerns regarding living conditions and ability of patient to care for herself, her , and, as reported to nursing, her special needs brother.

## 2019-01-14 NOTE — PROGRESS NOTES
0800 - Bedside and Verbal shift change report given to [employer] (oncoming nurse) by Mary Head RN (offgoing nurse). Report included the following information SBAR, Kardex, Intake/Output, MAR, Accordion, Recent Results and Cardiac Rhythm ST. During report, KIMBERLYN notified this RN that transport is on its way to  pt for GI series. Pt was not NPO overnight so test was cancelled. XRAY tech explained procedure to pt, which apparently can last between 2-6 hrs., pt then refused to do it and wants to leave AMA. She states, 'I can't leave my  that long and he isn't feeling god, I have to take him to his doctor.' 
 
0830 - Family practice notified of pt wishes. 0900 - Pt has a wound on L foot that is covered, pt refuses to have dressing changed by RN.  has all supplies and changed wound dressing himself. This RN was not in room to visualize wound. 5 - Family practice in room to discuss leaving AMA. Pt signed AMA form. 5517 - IV and telemetry removed.

## 2019-01-15 NOTE — DISCHARGE SUMMARY
Cristina Jules Sugey Walton [address]  Office [phone] Fax [phone] Discharge / Transfer / Off-Service Note Name: Nanci Solorzano MRN: [id number]  Sex: Female YOB: 1960  Age: 59 y.o. PCP: Man Lyons MD  
 
Date of admission: 1/11/2019 Date of discharge/transfer: 1/14/2019 Attending physician at admission: Dr. Brittney Pham Attending physician at discharge/transfer: Dr. Ivet Suarez Resident physician at discharge/transfer: Bishop Maria Luisa MD, PGY1 Consultants during hospitalization Gastroenterology Nephrology Admission diagnoses Hypokalemia Recommended follow-up after discharge 1. PCP  
2. Gastroenterology Things to follow up on with PCP: 
Hypokalemia Upper GI series Colonoscopy UTI Medication Changes: 
- Stop taking pantoprazole given it worsens hypokalemia.  
- Start taking Famotidine instead of pantoprazole. - Start taking potassium chloride (klor-con) packet two times a day daily.  
- Start taking keflex 500 mg capsule BID for 10 days History of Present Illness Per admitting provider, Nanci Solorzano is a 59 y.o. female w/ hx of Crohn's, polio, GERD, cerebral palsy, T2DM, HTN, HLD, PAD, RLS and a non-healing L foot ulcer who presents to the ER complaining of hypokalemia. She visited her PCP on 1/8/18 and was found to have a potassium of 2.0. Her PCP could not get in touch with her, so she sent a  to her house to bring her to the hospital.  
  
She reports that she has not been able to eat well for the past 2 months. She has been only able to consume about the amount that would \"fit in a thimble. \" She has only been able to tolerate is jello and liquids. She reports that anytime she tries to eat food, it comes right back up. Pt reports a 50 pound weight loss in the past few months, per chart review, she has lost about 20 pounds in the last 3 months.  She has been consistently nauseous w/ frequent daily dry heaving, but has not had emesis. She has also been having diffuse abdominal pain for these two months that feels similar to a crohn's exacerbation. Her stools have appeared abnormal for the past few months as well. She is constipated, but when she is able to produce stools, occasionally they have been almost black and other times they have seemed white. The last time she noted red in her stool was Oct 2018. She has not had a colonoscopy in 10 years or a EGD in 28. She also reports that she has been sleeping constantly during this time period.  
  
In the ER, vital signs were unremarkable. Labs were remarkable for K of 1.6 and WBC of 20.9. She did not have any imagine. She was treated with 3 runs of IV 10 meq potassium, and one dose of oral 40 meq potassium. HOSPITAL COURSE PATIENT LEFT AMA: Per Dr. Guzman So: Patient states her  needs to see a physician, that he is confused and does not feel well. Advised patient there is an ED downstairs - she states he cannot go down there without her, that she is his primary caretaker. Does not want to leave him for any period of time.  
  
Advised patient that she is leaving AMA and that the risks of leaving are resumption of electrolyte abnormalities, potential arrythmias, possibility of death. Discussed that we do not know etiology for her difficulty swallowing and that she needs further evaluation given her weight loss and smoking history. Also discussed new UTI, that she is currently receiving IV antibiotics, and potential for infection to worsen, spread to pyelo or bacteremia without antibiotics. Patient stated and expressed understanding and said she would talk to PCP.  
  
Pt and  are aware that no scripts will be provided and that staff cannot assist them in getting home. Patient has her wheelchair and  will drive them home.  Recommended they go to ED for  to be evaluated, but patient declines and says he will be fine and they will follow up with Dr. Myrna Fox. Patient declined to speak any further and requested AMA paperwork.  
  
AMA paperwork signed and placed in chart. Prior to leaving AMA the following Problems were being treated and evaluated: Hypokalemia - Improved. K 1.6 POA. Mg 1.8. Unknown etiology, possible related to lack of nutrition. UDS negative. Urine studies showed low urine K with low serum K, appropriate. Potassium, magnesium and phosphorus monitored daily and were repleted accordingly.  
  
Elevated anion gap metabolic acidosis - RESOLVED. VBG w pH 7.23, CO2 34, bicarb 14. Anion gap on admission 17. Lactic acid normal. Beta hydroxybutyrate normal. UA without ketones. Possibly due to starvation induced ketosis. Upon leaving AMA patient's anion gap was 14.  
  
Anemia, weight loss, and dysphagia w hx of Crohn's Disease - Patient lost 20 pounds in 3 months, unintentional. No colonoscopy in 10 years, no EGD in 35. Reports intermittent black and pale stools. White count has been uptrending for months. CT chest/abd/pelv Mediastinum/jono showed slight patulous appearance of the esophagus with enteric content suggesting dysmotility or reflux. Concern for malignancy given Crohn's and tobacco use. Not on  immunosuppressant. POA Hgb 11.5, baseline ~11-12. GI was following.  
- Pt will need outpatient colonoscopy and EGD 
- FOBT ordered, patient refused rectal exam, and patient did not present with BM during hospital stay. - It was recommended barium swallow and UGI with SBFT and Upper GI series was never completed given patient refused at first, and then when accepting study left before it could be performed. UTI- Patient complaining of dysuria, UA 1/12 with 30 protein, small LE, 3+ bacteria. Patient denies urinary symptoms. - received rocephin for two days and did not complete treatment.  
- Urine culture pending.  
  
 Chronic bladder incontinence -  
 - Continue home oxybutynin 
  
GERD - not well-controlled given persistent reflux. 
- holding home protonix as can lower K 
- start pepcid BID 
  
Financial stressors - Pt commented that she cannot afford staying in hospitals. Reports active lawsuit against her due to not paying hospital bills. Patient declines meeting with case management and patient advocate.  
  
Diabetes Mellitus T2 - A1c 7.4 (1/8/19)Home metformin and pioglitazone were held. Was on sliding scalde during hospital stay.  
  
Hypertension   
- Continue home lisinopril 20mg dailyd 
  At Risk JUDY Cr on admission 1.06 (BL ~ 0.5). RESOLVED.  
  
Hyperlipidemia: Chol 182, .8, HDL 22,  (1/11/18) - Noncompliant w home pravastatin 
  
PAD/Ischemic foot ulcer - Pt w/ non-healing ischemic L foot ulcer. Pt refused exam. 
- Continue home percocet 5-325 mg q4h PRN 
  
Constipation  - continue home docusate 
  
Depression - continue home elavil 50 mg QHS 
  
RLS - stable. Continue home ropinirole  
 
 
 
Physical exam at discharge: 
 
Vitals Reviewed. Visit Vitals BP 97/53 (BP 1 Location: Left arm, BP Patient Position: At rest) Pulse (!) 114 Temp 98.4 °F (36.9 °C) Resp 16 Ht 4' 9\" (1.448 m) Wt 124 lb 9.6 oz (56.5 kg) SpO2 100% BMI 26.96 kg/m² General No acute distress. Alert. Cooperative. Cachectic in appearance. Appears well-nourished, no distress. Not diaphoretic. HENT Head Normocephalic and atraumatic. Eyes Conjunctivae are normal, no discharge. No scleral icterus. Nose Nose normal, clear turbinates. Oral Oropharynx is clear and moist. No oropharyngeal exudate. Neck No thyromegaly present. No cervical adenopathy. Cardio Normal rate, regular rhythm. Exam reveals no gallop and no friction rub. No murmur heard. No chest wall tenderness. Pulmonary Effort normal and breath sounds normal. No respiratory distress. No wheezes, no rales. Abdominal Soft. Bowel sounds normal. + bowel sounds. Diffusely tender to palpation. No rebound tenderness or guarding. Nondistended.  Patient presented with suprapubic pain. Extremities Full ROM in all extremities. L foot wrapped in bandage with chronic venous skin changes. Patient refuses to let me remove bandage to assess wound Neurological Alert and oriented to person, place, and time. Dermatology Skin is warm and dry. No rash noted. No erythema or pallor. Psychiatric Normal affect Condition at discharge: patient left against medical advice, unstable from medical stand point Labs Recent Labs  
  01/14/19 
0225 01/13/19 
1302 01/12/19 
0950 01/12/19 
0022 WBC 18.8* 16.7*  --  17.2* HGB 9.5* 9.5* 9.8* 9.7* HCT 28.3* 27.9* 28.5* 28.2*  
 398  --  426* Recent Labs  
  01/14/19 
0225 01/13/19 
2225 01/13/19 
1307 01/13/19 
4078  138 139 141  
K 3.3* 3.7 3.7 3.3*  
 106 109* 109* CO2 16* 18* 16* 18* BUN 12 14 13 17 CREA 0.67 0.74 0.69 0.70 * 201* 255* 183* CA 8.1* 8.0* 7.8* 8.1*  
MG 1.7 1.8  --  1.5* PHOS 2.8 2.7  --  2.7 Recent Labs  
  01/14/19 
0225 01/13/19 
0212 01/12/19 
0022 SGOT 14* 13* 16 ALT 22 25 25 * 133* 114 TBILI 0.2 0.2 0.2 TP 6.0* 6.0* 5.8* ALB 2.2* 2.4* 2.4*  
GLOB 3.8 3.6 3.4 Recent Labs  
  01/14/19 
0659 01/14/19 
0136 01/13/19 
2054 01/13/19 
1606 01/13/19 
1118 GLUCPOC 166* 269* 223* 246* 199* Cultures · Urine culture Procedures / Diagnostic Studies Imaging Results from Hospital Encounter encounter on 01/11/19 XR CHEST PORT Narrative EXAM: XR CHEST PORT INDICATION: fever COMPARISON: June 29, 2016 FINDINGS: A portable AP radiograph of the chest was obtained at 0 137 hours. The 
patient is on a cardiac monitor. There is a minimal airspace disease at the left 
base which appears to represent scar.  The cardiac and mediastinal contours and 
 pulmonary vascularity are normal.  The bones and soft tissues are grossly within 
normal limits. Impression IMPRESSION: No acute findings. No results found for this or any previous visit. Results from Hospital Encounter encounter on 01/11/19 CT CHEST ABD PELV W CONT Narrative EXAM:  CT CHEST ABD PELV W CONT INDICATION:  concern for malignancy. Additional history: Hypokalemia COMPARISON: CT of the abdomen and pelvis, 2/10/2013. [facility] TECHNIQUE:  
Multislice helical CT was performed from the thoracic inlet to the pubic 
symphysis with intravenous contrast administration. Contiguous 5 mm axial images 
were reconstructed and lung and soft tissue windows were generated. Coronal and 
sagittal reformations were generated. CT dose reduction was achieved through use of a standardized protocol tailored 
for this examination and automatic exposure control for dose modulation. [facility] FINDINGS: 
CHEST: 
Chest wall/thoracic inlet: Within normal limits. Thyroid: Within normal limits. Mediastinum/jono: Slight patulous appearance of the esophagus with enteric 
content suggesting dysmotility or reflux. Heart/vessels: Calcifications in the coronary arteries. Lungs/Pleura: Within normal limits. Flavio [place] ABDOMEN: 
Liver: Postsurgical changes at the posterior margin of the right hepatic lobe. Gallbladder/Biliary: Status post post cholecystectomy. Spleen: Within normal limits. Pancreas: Within normal limits. Adrenals: Within normal limits. Kidneys: Low-attenuation lesions in both kidneys which aren't completely 
characterized and may represent cysts. Peritoneum/Mesenteries: Within normal limits. Extraperitoneum: Within normal limits. Gastrointestinal tract: Diverticulosis throughout the large bowel. Vascular: Within normal limits. Flavio [place] PELVIS: 
Extraperitoneum: Within normal limits. Ureters: The proximal mid ureters are unremarkable. The distal ureters are not 
well visualized. Bladder: Within normal limits. Reproductive System: Status post hysterectomy. Janine [place] MSK:  
Fat-containing umbilical hernia. Suprapubic, [place] and shallow hernia. Degenerative changes throughout the spine, nonfocal 
. Impression IMPRESSION: 
1. No CT explanation for the patient's hypokalemia. No procedure found. Chronic diagnoses Problem List as of 1/14/2019 Date Reviewed: 1/8/2019 Codes Class Noted - Resolved * (Principal) Hypokalemia ICD-10-CM: E87.6 ICD-9-CM: 276.8  1/11/2019 - Present Acute cystitis ICD-10-CM: N30.00 ICD-9-CM: 595.0  1/12/2018 - Present Pancreatitis ICD-10-CM: K85.90 ICD-9-CM: 543.6  1/11/2018 - Present Chronic ulcer of left foot ([address] ICD-10-CM: N43.220 ICD-9-CM: 707.15  11/4/2017 - Present Cellulitis of left lower leg ICD-10-CM: L03.116 ICD-9-CM: 682.6  11/4/2017 - Present Pseudomonas aeruginosa infection ICD-10-CM: A49.8 ICD-9-CM: 041.7  11/4/2017 - Present Open wound of left foot ICD-10-CM: C30.452Z ICD-9-CM: 892.0  11/1/2017 - Present Cellulitis ICD-10-CM: L03.90 ICD-9-CM: 682.9  10/31/2017 - Present At risk for renal compromise ICD-10-CM: Z91.89 ICD-9-CM: V15.89  6/29/2016 - Present Cellulitis of groin ICD-10-CM: L19.684 ICD-9-CM: 682.2  6/29/2016 - Present Essential hypertension with goal blood pressure less than 140/90 ICD-10-CM: I10 
ICD-9-CM: 401.9  6/29/2016 - Present Post-polio syndrome ICD-10-CM: G14 
ICD-9-CM: 138  6/29/2016 - Present Type 2 diabetes mellitus with diabetic neuropathy (HCC) ICD-10-CM: E11.40 ICD-9-CM: 250.60, 357.2  6/29/2016 - Present Hyperkalemia ICD-10-CM: E87.5 ICD-9-CM: 276.7  6/29/2016 - Present Overactive bladder ICD-10-CM: N32.81 ICD-9-CM: 596.51  6/29/2016 - Present Cellulitis of ankle ICD-10-CM: L03.119 ICD-9-CM: 682.6  3/3/2016 - Present Arterial insufficiency with ischemic ulcer (HCC) ICD-10-CM: I77.1, L98.499 ICD-9-CM: 447.1, 707.9  3/3/2016 - Present Urinary incontinence ICD-10-CM: R32 
ICD-9-CM: 788.30  3/2/2016 - Present Restless leg syndrome ICD-10-CM: G25.81 ICD-9-CM: 333.94  3/2/2016 - Present Asymptomatic bacteriuria ICD-10-CM: R82.71 ICD-9-CM: 791.9  8/30/2015 - Present Muscle spasm ICD-10-CM: I11.608 ICD-9-CM: 728.85  6/19/2014 - Present Overview Signed 6/19/2014 11:38 AM by Lou Purcell MD  
  chronic PAD (peripheral artery disease) (HCC) ICD-10-CM: I73.9 ICD-9-CM: 443.9  6/19/2014 - Present Financial difficulties ICD-10-CM: Z59.8 ICD-9-CM: V60.2  10/4/2012 - Present Tobacco abuse ICD-10-CM: Z72.0 ICD-9-CM: 305.1  8/10/2012 - Present Primary osteoarthritis of both hips ICD-10-CM: M16.0 ICD-9-CM: 715.15  8/9/2012 - Present Edema extremities ICD-10-CM: R60.0 ICD-9-CM: 782.3  12/1/2011 - Present Crohn disease ([facility] ICD-10-CM: K50.90 ICD-9-CM: 555.9  6/29/2011 - Present Polio ICD-10-CM: A80.9 ICD-9-CM: 045.90  6/29/2011 - Present Cerebral palsy ([facility] ICD-10-CM: G80.9 ICD-9-CM: 343.9  6/29/2011 - Present Uncontrolled type 2 diabetes mellitus with hyperglycemia (HCC) ICD-10-CM: E11.65 ICD-9-CM: 250.02  6/29/2011 - Present  
   
 TIA (transient ischemic attack) ICD-10-CM: G45.9 ICD-9-CM: 435.9  6/29/2011 - Present Overview Signed 6/29/2011  9:16 AM by Estevan Quintero X 3 Migraines ICD-10-CM: V88.452 ICD-9-CM: 346.90  6/29/2011 - Present HTN (hypertension) ICD-10-CM: I10 
ICD-9-CM: 401.9  6/29/2011 - Present Elevated lipids ICD-10-CM: E78.5 ICD-9-CM: 272.4  6/29/2011 - Present RESOLVED: Sepsis ([address] ICD-10-CM: A41.9 ICD-9-CM: 038.9, 995.91  6/28/2016 - 11/30/2017 RESOLVED: Wound of left ankle ICD-10-CM: N33.249Z ICD-9-CM: 891.0  8/30/2015 - 1/25/2018 RESOLVED: SIRS (systemic inflammatory response syndrome) ([facility]) ICD-10-CM: R65.10 ICD-9-CM: 995.90  8/29/2015 - 6/29/2016 RESOLVED: UTI (lower urinary tract infection) ICD-10-CM: N39.0 ICD-9-CM: 599.0  6/23/2014 - 6/29/2016 Discharge/Transfer Medications Discharge Medication List as of 1/14/2019  9:44 AM  
  
CONTINUE these medications which have NOT CHANGED Details  
oxyCODONE-acetaminophen (PERCOCET) 5-325 mg per tablet Take 1 Tab by mouth every four (4) hours as needed for Pain. Max Daily Amount: 6 Tabs. MUST LAST 30 DAYS, Print, Disp-45 Tab, R-0  
  
promethazine (PHENERGAN) 25 mg tablet Take 1 Tab by mouth every six (6) hours as needed for Nausea., Normal, Disp-30 Tab, R-5  
  
oxybutynin (DITROPAN) 5 mg tablet TAKE ONE TABLET BY MOUTH 4 TIMES DAILY, Normal, Disp-120 Tab, R-5  
  
rOPINIRole (REQUIP) 0.5 mg tablet Take 1 Tab by mouth nightly., Normal, Disp-30 Tab, R-5  
  
amitriptyline (ELAVIL) 50 mg tablet TAKE 1 TABLET BY MOUTH ONCE DAILY AT NIGHT, NormalPlease consider 90 day supplies to promote better adherenceDisp-30 Tab, R-2  
  
ibuprofen (MOTRIN) 800 mg tablet Take 1 Tab by mouth every eight (8) hours as needed for Pain., Normal, Disp-30 Tab, R-2  
  
pantoprazole (PROTONIX) 40 mg tablet Take 1 Tab by mouth daily. , Normal, Disp-30 Tab, R-2  
  
potassium chloride (KLOR-CON) 10 mEq tablet Take 1 Tab by mouth daily. , Normal, Disp-30 Tab, R-5  
  
pioglitazone (ACTOS) 30 mg tablet Take 1 Tab by mouth daily. , Normal, Disp-30 Tab, R-5  
  
insulin aspart protamine/insulin aspart (NOVOLOG MIX 70-30) 100 unit/mL (70-30) injection 10 Units by SubCUTAneous route Before breakfast and dinner., Historical Med  
  
ondansetron (ZOFRAN ODT) 4 mg disintegrating tablet Take 1 Tab by mouth every eight (8) hours as needed for Nausea., Normal, Disp-20 Tab, R-1  
  
diphenhydrAMINE (BENADRYL) 25 mg tablet Take 50 mg by mouth nightly as needed for Itching., Historical Med  
  
 lisinopril (PRINIVIL, ZESTRIL) 20 mg tablet TAKE ONE TABLET BY MOUTH ONCE DAILY, NormalPlease consider 90 day supplies to promote better adherenceDisp-30 Tab, R-5  
  
pravastatin (PRAVACHOL) 40 mg tablet Take 1 Tab by mouth daily. , Normal, Disp-30 Tab, R-5  
  
metFORMIN (GLUCOPHAGE) 1,000 mg tablet Take 1 Tab by mouth two (2) times daily (with meals). , Normal, Disp-60 Tab, R-5  
  
docusate sodium (COLACE) 100 mg capsule Take 100 mg by mouth two (2) times daily as needed for Constipation. , Historical Med  
  
fish oil-omega-3 fatty acids 340-1,000 mg capsule Take 1 Cap by mouth daily. , Historical Med  
  
cholecalciferol (VITAMIN D3) 1,000 unit tablet Take 1,000 Units by mouth daily. , Historical Med  
  
aspirin delayed-release (ASPIR-81) 81 mg tablet Take 1 Tab by mouth daily. , Normal, Disp-100 Tab, R-prn  
  
  
 
  
Diet:  Clear liquid diet. Activity:  As tolerated Disposition: Left Against Medical Advice Discharge instructions to patient/family Please seek medical attention for any new or worsening symptoms particularly fever, chest pain, shortness of breath, abdominal pain, nausea, vomiting Follow up plans/appointments Follow-up Information Follow up With Specialties Details Why Contact Info Nichelle Lyons MD Family Practice Go on 1/17/2019 Hospital follow-up scheduled at 1:45pm ( If you have questions or need to reschedule please call [phone] [address] [place] [address] 
[phone] Aure Gunderson MD 
Family Medicine Resident For Billing Chief Complaint Patient presents with  Abnormal Lab Results Hospital Problems  Date Reviewed: 1/8/2019 Codes Class Noted POA * (Principal) Hypokalemia ICD-10-CM: E87.6 ICD-9-CM: 276.8  1/11/2019 Unknown Type 2 diabetes mellitus with diabetic neuropathy (HCC) ICD-10-CM: E11.40 ICD-9-CM: 250.60, 357.2  6/29/2016 Yes  Overactive bladder ICD-10-CM: N32.81 
 ICD-9-CM: 596.51  6/29/2016 Yes Urinary incontinence ICD-10-CM: R32 
ICD-9-CM: 788.30  3/2/2016 Yes PAD (peripheral artery disease) (HCC) ICD-10-CM: I73.9 ICD-9-CM: 443.9  6/19/2014 Yes Crohn disease ([facility] ICD-10-CM: K50.90 ICD-9-CM: 555.9  6/29/2011 Yes Cerebral palsy ([facility] ICD-10-CM: G80.9 ICD-9-CM: 343.9  6/29/2011 Yes Uncontrolled type 2 diabetes mellitus with hyperglycemia (HCC) ICD-10-CM: E11.65 ICD-9-CM: 250.02  6/29/2011 Yes  
   
 TIA (transient ischemic attack) ICD-10-CM: G45.9 ICD-9-CM: 435.9  6/29/2011 Yes Overview Signed 6/29/2011  9:16 AM by Alcides Dietz X 3 Migraines ICD-10-CM: U42.111 ICD-9-CM: 346.90  6/29/2011 Yes HTN (hypertension) ICD-10-CM: I10 
ICD-9-CM: 401.9  6/29/2011 Yes Elevated lipids ICD-10-CM: E78.5 ICD-9-CM: 272.4  6/29/2011 Yes

## 2019-01-15 NOTE — PROGRESS NOTES
Received call from Dr. Víctor Ken that patient had critical potassium level and patient was unable to be contacted. Multiple phone call attempts and my chart messages. No working phone numbers on HIPAA form or emergency contact list. Discussed calling non-emergent police line and also gave information for [facility] program that does wellness visits. Non-emergent police went to patient's home and advised that PCP office was trying to contact her. Patient presented to [facility] ED as directed by Dr. Víctor Ken and was admitted for hypokalemia. Potassium was 1.6 on arrival to the ED. Dr. Víctor Ken reports that this patient has multiple health issues and financial difficulties. Patient will be high risk for readmission and/or poor outcomes following discharge. Noted that patient was discharged yesterday. NN will follow-up for transitions of care.

## 2019-01-21 NOTE — PROGRESS NOTES
Goals  Attends follow-up appointments as directed. 1/21/19 - See previous NN note. Patient returned call to NN. Scheduled appointment for 1/23/19. Patient will attend appointment.  DANIS

## 2019-01-21 NOTE — TELEPHONE ENCOUNTER
Attempted to call pt daily starting 1/15 for follow up as she left [facility] AMA. The phone rings but no one picks up, voicemail box full. On another attempt on 1/20 a busy tone. I have made PCP aware on 1/21.  Will continue to follow-up    Jenniffer Carbajal,

## 2019-01-23 NOTE — PROGRESS NOTES
Chief Complaint Patient presents with  
[facility] for hypokalemia. Pt reports that she is taking her husbands potassium, has not yet picked up her own prescriptions. Pt was being treated for a UTI. Pt has not started prescribed medications. Pt reports that she feels better, but has not scheduled any GI follow up, is not sure she wants to. Pt reports that being in the hospital caused a small wound on her bottom to become larger, also has several other new small wounds. Subjective: (As above and below) Chief Complaint Patient presents with  
[facility] for hypokalemia. she is a 59y.o. year old female who presents for evaluation. Reviewed PmHx, RxHx, FmHx, SocHx, AllgHx and updated in chart. Review of Systems - negative except as listed above Objective:  
 
Vitals:  
 01/23/19 1526 BP: 134/71 Pulse: (!) 103 Resp: 19 Temp: 98 °F (36.7 °C) SpO2: 100% Weight: 125 lb (56.7 kg) Height: 4' 9\" (1.448 m) Physical Examination: General appearance - alert, well appearing, and in no distress Mental status - alert, oriented to person, place, and time Mouth - mucous membranes moist, pharynx normal without lesions Chest - clear to auscultation, no wheezes, rales or rhonchi, symmetric air entry Heart - normal rate, regular rhythm, normal S1, S2, no murmurs, rubs, clicks or gallops Skin - declined by pt Assessment/ Plan: 1. Cerebral palsy, unspecified type ([address] -stable 2. Financial difficulties 
-reviewed cost of medications at pharmacy, arranged with pharmacist for temporary fill 3. Hypokalemia 
-recheck labs, continue on potassium - METABOLIC PANEL, COMPREHENSIVE 
- CBC WITH AUTOMATED DIFF 4. Wound of sacral region, initial encounter 
-pt declined exam, advised to continue home wound care, sit on soft surfaces as able Follow-up Disposition: As needed I have discussed the diagnosis with the patient and the intended plan as seen in the above orders. The patient has received an after-visit summary and questions were answered concerning future plans. Medication Side Effects and Warnings were discussed with patient: yes Patient Labs were reviewed: yes Patient Past Records were reviewed:  yes iMssy Michaud M.D.

## 2019-01-24 NOTE — TELEPHONE ENCOUNTER
----- Message from Lyla Miller sent at 1/24/2019  3:17 PM EST -----  Regarding: Dr. Ramu Sullivan   Pt is requesting a call back regarding if the practice sent in her RX order for [facility]iotic at [facility] [phone]). Best contact is [phone].

## 2019-01-24 NOTE — TELEPHONE ENCOUNTER
Patient states Keflex was never given to her from the hospital.  She is asking if you still wish for her to take this if so needs to be called in    Patient may be reached at

## 2019-01-24 NOTE — PROGRESS NOTES
Goals  Attends follow-up appointments as directed. 1/21/19 - See previous NN note. Patient returned call to NN. Scheduled appointment for 1/23/19. Patient will attend appointment. DANIS 
 
1/24/19 - Patient attended follow-up appointment yesterday. Patient called in today with medication concerns. Spoke with Dr. Marisela Ordoñez. Prescriptions for keflex, pepcid, and potassium called in to pharmacy as ordered. Called patient and made aware prescriptions will be ready for  tonight.  DANIS

## 2019-01-31 NOTE — PROGRESS NOTES
Goals  Attends follow-up appointments as directed. 1/21/19 - See previous NN note. Patient returned call to NN. Scheduled appointment for 1/23/19. Patient will attend appointment. DANIS 
 
1/24/19 - Patient attended follow-up appointment yesterday. Patient called in today with medication concerns. Spoke with Dr. Fortunato Pitts. Prescriptions for keflex, pepcid, and potassium called in to pharmacy as ordered. Called patient and made aware prescriptions will be ready for  tonight. DANIS 
 
1/31/19 - Spoke with patient who confirms that she is taking Keflex and potassium as prescribed. Will send message to Dr. Fortunato Pitts to inquire if she wants to repeat labs at any point.  DANIS

## 2019-02-05 NOTE — PROGRESS NOTES
Goals  Attends follow-up appointments as directed. 1/21/19 - See previous NN note. Patient returned call to NN. Scheduled appointment for 1/23/19. Patient will attend appointment. DANIS 
 
1/24/19 - Patient attended follow-up appointment yesterday. Patient called in today with medication concerns. Spoke with Dr. Yoli Herzog. Prescriptions for keflex, pepcid, and potassium called in to pharmacy as ordered. Called patient and made aware prescriptions will be ready for  tonight. DANIS 
 
1/31/19 - Spoke with patient who confirms that she is taking Keflex and potassium as prescribed. Will send message to Dr. Yoli Herzog to inquire if she wants to repeat labs at any point. DANIS 
 
2/5/19 - Dr. Yoli Herzog would like to repeat labs in 2-3 weeks. Called patient to book appointment. Appointment booked for 2/25. Patient also reports that she has been experiencing urinary and fecal incontinence since being home from the hospital. Reports that stool is soft, no diarrhea. No nausea or vomiting. Reports that she completed Keflex yesterday. Has appointment with GI doctor on 4/11/19 (reports that this is the earliest appointment she could get). Message sent to Dr. Yoli Herzog.  DANIS

## 2019-02-05 NOTE — Clinical Note
Appointment booked for 2/25 to repeat labs. Patient also reports that she has been experiencing urinary and fecal incontinence since being home from the hospital. Reports that stool is soft, no diarrhea. No nausea or vomiting. Completed Keflex yesterday. Has appointment with GI doctor on 4/11/19 (reports that this is the earliest appointment she could get). Okay to to wait to discuss at 2/25 appointment? Would you like to see her sooner?

## 2019-02-26 NOTE — PROGRESS NOTES
Goals Addressed This Visit's Progress  Supportive resources in place to maintain patient in the community 2/26/19 - Patient has cancelled appointment multiple times due to lack of transportation and financial barriers. Call placed to [employer] to inquire about potential services to include assistance with applying for Social Security Disability and Medicaid as well as resources for transportation. Message left for intake department to return call. Also placed call to [employer] (patient lives in [place]) to inquire about services. Message left for intake department to return call. DANIS 
 
2/28/19 - Received call back from [employer]. They cannot offer services due to patient living in [place]. Call placed again to [employer] to inquire about services. Spoke to , Bradley Weller. They offer medical transportation as well as assistence with SS disability and Medicaid Applications. Referral made. Manas Carranza will contact the patient today or tomorrow. Call placed to patient to make aware.  DANIS

## 2019-03-04 NOTE — PROGRESS NOTES
Chief Complaint Patient presents with  Follow Up Chronic Condition  Stool Color Change  
  states that her stools are black, thinks that her crohns may be back.  Labs Pt reports that her stool have been black off and on for several months, more often now. Pt also reports increased muscle spasms since block stools have increased in frequency. Pt reports that the spasms have been lasting longer. Pt also reports abdominal bloating along with dark stools. Pt reports that she has had very little appetite, felt nauseated. Subjective: (As above and below) Chief Complaint Patient presents with  Follow Up Chronic Condition  Stool Color Change  
  states that her stools are black, thinks that her crohns may be back.  Labs  
 
she is a 59y.o. year old female who presents for evaluation. Reviewed PmHx, RxHx, FmHx, SocHx, AllgHx and updated in chart. Review of Systems - negative except as listed above Objective:  
 
Vitals:  
 03/04/19 1354 BP: 134/82 Pulse: 97 Resp: 20 Temp: 97.3 °F (36.3 °C) SpO2: 98% Weight: 114 lb (51.7 kg) Height: 4' 9\" (1.448 m) Physical Examination: General appearance - alert, well appearing, and in no distress Mental status - normal mood, behavior, speech, dress, motor activity, and thought processes Mouth - mucous membranes moist, pharynx normal without lesions Chest - clear to auscultation, no wheezes, rales or rhonchi, symmetric air entry Heart - normal rate, regular rhythm, normal S1, S2, no murmurs, rubs, clicks or gallops Musculoskeletal - in wheelchair, chronic ulceration on left foot, dressed, not examined today Assessment/ Plan: 1. Crohn's disease with other complication, unspecified gastrointestinal tract location [facility]) -refer to GI 
 
2. Uncontrolled type 2 diabetes mellitus with hyperglycemia ([facility] [address] 
-check labs - METABOLIC PANEL, COMPREHENSIVE 
- CBC WITH AUTOMATED DIFF 
- LIPID PANEL 
- TSH 3RD GENERATION 
 - HEMOGLOBIN A1C WITH EAG 
- FUNDUS PHOTOGRAPHY 3. Essential hypertension 
-well controlled 4. Financial difficulties 
-ongoing issue, pt feels very frustrated that more options are not available to her Follow-up Disposition: As needed I have discussed the diagnosis with the patient and the intended plan as seen in the above orders. The patient has received an after-visit summary and questions were answered concerning future plans. Medication Side Effects and Warnings were discussed with patient: yes Patient Labs were reviewed: yes Patient Past Records were reviewed:  yes Ashley Gasca M.D.

## 2019-03-05 NOTE — PROGRESS NOTES
Potassium is low, please confirm that pt is taking supplement daily. Diabetes is greatly improved, well controlled  Vitamin D is very low, need prescription supplement, twice weekly, please advise if pt agrees. All other labs are within normal limits.    Please inform

## 2019-03-06 NOTE — PROGRESS NOTES
Patient id x 3, notified of labs and verbalized understanding. Pt states that she is taking her potassium daily, please advise. Agrees to have Vit D supplement sent to pharm.

## 2019-03-07 NOTE — PROGRESS NOTES
I have attempted without success to contact this patient by phone to discuss the providers advisement. Voice mail message left to return call to the office.

## 2019-04-15 NOTE — TELEPHONE ENCOUNTER
is calling wanting Dr Lyons to know he is taking pt to [facility] he states it is important for her to know this

## 2019-04-17 ENCOUNTER — TELEPHONE (OUTPATIENT)
Dept: FAMILY MEDICINE CLINIC | Age: 59
End: 2019-04-17

## 2019-04-17 ENCOUNTER — DOCUMENTATION ONLY (OUTPATIENT)
Dept: FAMILY MEDICINE CLINIC | Age: 59
End: 2019-04-17

## 2019-04-17 NOTE — TELEPHONE ENCOUNTER
Kwan Angel from [facility] in [facility] checking to see if one of the providers in [facility] would sign patient's death certificate. Since patient has only seen Dr Lyons they wanted her to sign death certificate. Kwan Angel will be mailing death certificate to Dr Lyons at the [facility].  Any questions Kwan Angel can be reached @[phone]

## 2019-04-17 NOTE — PROGRESS NOTES
[id number] Death Certification brought to office by  home  Patient  on 4-15-19.    home notified Dr Lyons saw patient and provided them with address per Dr Sanders/radha

## 2019-05-31 ENCOUNTER — DOCUMENTATION ONLY (OUTPATIENT)
Dept: FAMILY MEDICINE CLINIC | Age: 59
End: 2019-05-31

## 2019-05-31 NOTE — PROGRESS NOTES
[address] for medical records was faxed to [address] [place] [address] to be processed

## 2019-07-05 ENCOUNTER — DOCUMENTATION ONLY (OUTPATIENT)
Dept: FAMILY MEDICINE CLINIC | Age: 59
End: 2019-07-05

## 2019-07-05 NOTE — PROGRESS NOTES
[place] [facility] Request for medical records was faxed to [address] [place] [address] to be processed(including copy of death certificate)

## 2019-07-23 NOTE — PROGRESS NOTES
7/19/19 faxed request to & for [place] [facility] to refax request with our name and address so that Ambermarcelladrian Carcamo will process.   7/23/19 [facility] [place] [facility] @ [phone] lvm of what was needed before [employer] would process request.

## 2023-01-01 NOTE — TELEPHONE ENCOUNTER
Outbound F/U call to patient, identifiers ( & address) verified per HIPAA policy. Identified self, role and nature of the call. Pt acknowledged understanding. Inquired if pt has heard back re: status of Care Card application \"no, I haven't heard anything. I applied last year, and nothing\". Writer provided pt the following telephone # to check on the status of pending Care Card application @ [phone]. Pt agreed to F/U on the status.       MARK Mortensen 4

## 2023-08-01 NOTE — MR AVS SNAPSHOT
Left message to schedule upcoming cardiac procedure   Visit Information Date & Time Provider Department Dept. Phone Encounter #  
 11/13/2017  1:35 PM Jonah Nation MD [address] [facility] [phone] [id number] Your Appointments 11/20/2017 11:20 AM  
ESTABLISHED PATIENT with Lennox Hoist, MD  
[facility] Dr. Vijay Matamor[facility] ([address]) Appt Note: hospital fu; r/s from 11/13/17  
 [address], [address], [address] [address]  
[address], [address], [address] [facility] [address] [phone] Upcoming Health Maintenance Date Due  
 EYE EXAM RETINAL OR DILATED Q1 9/24/1968 COLONOSCOPY 9/24/1976 BREAST CANCER SCRN MAMMOGRAM 9/24/2008 MICROALBUMIN Q1 2/8/2017 FOOT EXAM Q1 3/4/2017 PAP AKA CERVICAL CYTOLOGY 6/19/2017 HEMOGLOBIN A1C Q6M 4/3/2018 LIPID PANEL Q1 10/3/2018 DTaP/Tdap/Td series (2 - Td) 8/29/2025 Allergies as of 11/13/2017  Review Complete On: 11/13/2017 By: Jonah Nation MD  
  
 Severity Noted Reaction Type Reactions Potassium Medium 03/03/2016    Other (comments) Patient states she will refuse IV Potassium due to irritation-Patient will accept PO form. Anaprox [Naproxen Sodium]  10/05/2011    Other (comments) Blood pressure drops Tolerates ibuprofen Codeine  10/05/2011    Nausea and Vomiting Tolerates oxycodone/acetaminophen Mobic [Meloxicam]  10/05/2011    Other (comments) Blood pressure drops Tolerates ibuprofen Tylenol [Acetaminophen]  08/29/2015    Other (comments) \"Stomach hurts\", hx of Crohn's disease Current Immunizations  Reviewed on 10/3/2017 Name Date Influenza Vaccine 1/14/2014 Influenza Vaccine (Quad) PF 10/3/2017, 10/25/2016, 10/14/2015, 12/17/2014 Influenza Vaccine Split 10/4/2012, 10/5/2011 Pneumococcal Polysaccharide (PPSV-23) 1/14/2014  Tdap 8/29/2015  8:35 PM  
 ZZZ-RETIRED (DO NOT USE) Pneumococcal Vaccine (Unspecified Type) 10/5/2011 Not reviewed this visit You Were Diagnosed With   
  
 Codes Comments PAD (peripheral artery disease) (HCC)    -  Primary ICD-10-CM: I73.9 ICD-9-CM: 443.9 Ulcer of foot, chronic, left, with unspecified severity ([facility]     ICD-10-CM: B05.128 ICD-9-CM: 707.15 Controlled type 2 DM with peripheral circulatory disorder (HCC)     ICD-10-CM: E11.51 
ICD-9-CM: 250.70, 443.81 Vitals BP Pulse Temp Resp Height(growth percentile) Weight(growth percentile) (!) 148/93 (BP 1 Location: Left arm, BP Patient Position: Sitting) 73 98.2 °F (36.8 °C) (Oral) 18 4' 9\" (1.448 m) 136 lb (61.7 kg) SpO2 BMI OB Status Smoking Status 97% 29.43 kg/m2 Hysterectomy Current Every Day Smoker Vitals History BMI and BSA Data Body Mass Index Body Surface Area  
 29.43 kg/m 2 1.58 m 2 Preferred Pharmacy Pharmacy Name Phone [facility] [address] Your Updated Medication List  
  
   
This list is accurate as of: 11/13/17  2:32 PM.  Always use your most recent med list.  
  
  
  
  
 aspirin delayed-release 81 mg tablet Commonly known as:  AMLJT-79 Take 1 Tab by mouth daily. collagenase 250 unit/gram ointment Commonly known as:  SANTYL Apply 1 Each to affected area daily. apply nickel thick layer to left foot/ankle wound daily diphenhydrAMINE 25 mg tablet Commonly known as:  BENADRYL Take 50 mg by mouth nightly as needed for Itching. docusate sodium 100 mg capsule Commonly known as:  Cathleen Eisenmenger Take 100 mg by mouth two (2) times daily as needed for Constipation. fish oil-omega-3 fatty acids 340-1,000 mg capsule Take 1 Cap by mouth daily. fluconazole 150 mg tablet Commonly known as:  DIFLUCAN Take 1 Tab by mouth daily. Repeat in 3 days if needed. ibuprofen 800 mg tablet Commonly known as:  MOTRIN  
 Take 1 Tab by mouth every eight (8) hours as needed for Pain. insulin NPH/insulin regular 100 unit/mL (70-30) injection Commonly known as:  NOVOLIN 70/30, HUMULIN 70/30  
10 Units by SubCUTAneous route Before breakfast and dinner for 30 days. levoFLOXacin 750 mg tablet Commonly known as:  Lyla Adelaide Take 1 Tab by mouth Daily (before breakfast) for 10 days. lisinopril 20 mg tablet Commonly known as:  PRINIVIL, ZESTRIL  
TAKE ONE TABLET BY MOUTH ONCE DAILY  
  
 metFORMIN 1,000 mg tablet Commonly known as:  GLUCOPHAGE Take 1 Tab by mouth two (2) times daily (with meals). oxybutynin 5 mg tablet Commonly known as:  DITROPAN  
TAKE ONE TABLET BY MOUTH 4 TIMES DAILY  
  
 oxyCODONE-acetaminophen 5-325 mg per tablet Commonly known as:  PERCOCET Take 1 Tab by mouth every four (4) hours as needed for Pain. Max Daily Amount: 6 Tabs. pioglitazone 30 mg tablet Commonly known as:  ACTOS Take 1 Tab by mouth daily. pravastatin 40 mg tablet Commonly known as:  PRAVACHOL Take 1 Tab by mouth daily. rOPINIRole 0.5 mg tablet Commonly known as:  Carolynn Limber Take 1 Tab by mouth nightly. Saccharomyces boulardii 250 mg capsule Commonly known as:  PROBIOTIC (S.BOULARDII) Take 1 Cap by mouth two (2) times a day for 10 days. VITAMIN D3 1,000 unit tablet Generic drug:  cholecalciferol Take 1,000 Units by mouth daily. Prescriptions Sent to Pharmacy Refills  
 insulin NPH/insulin regular (NOVOLIN 70/30, HUMULIN 70/30) 100 unit/mL (70-30) injection 5 Sig: 10 Units by SubCUTAneous route Before breakfast and dinner for 30 days. Class: Normal  
 Pharmacy: [address] [place] [address] Ph #: [phone] Route: SubCUTAneous To-Do List   
 11/14/2017 10:30 AM  
  Appointment with Pratik Anderson at [place] [address]  
  
 11/14/2017 2:00 PM  
 Appointment with Salma Escobedo at [place] [address]  
  
 11/17/2017 To Be Determined Appointment with Smita Canada LPN at [place] [address]  
  
 11/21/2017 To Be Determined Appointment with Smita Canada LPN at [place] [address]  
  
 11/24/2017 To Be Determined Appointment with Smita Canada LPN at [place] [address]  
  
 11/28/2017 To Be Determined Appointment with Smita Canada LPN at [place] [address]  
  
 12/01/2017 To Be Determined Appointment with Migdalia Mcknight RN at [place] [address]  
  
 12/01/2017 To Be Determined Appointment with Migdalia Mcknight RN at [place] [address] Introducing [facility]! Dear Tom Otero: 
Thank you for requesting a [employer] account. Our records indicate that you already have an active [employer] account. You can access your account anytime at [email or web address] Did you know that you can access your hospital and ER discharge instructions at any time in [employer]? You can also review all of your test results from your hospital stay or ER visit. Additional Information If you have questions, please visit the Frequently Asked Questions section of the [employer] website at [email or web address] Remember, [employer] is NOT to be used for urgent needs. For medical emergencies, dial 911. Now available from your iPhone and Android! Please provide this summary of care documentation to your next provider. Your primary care clinician is listed as Janee Lyons. If you have any questions after today's visit, please call [phone].

## 2024-12-19 NOTE — PROGRESS NOTES
Hospital Discharge Follow-Up Date/Time:  1/16/2019 3:30 PM 
 
Patient was admitted to [facility] on 1/11/19 and left AMA on 1/14/19 for hypokalemia. The physician discharge summary was available at the time of outreach. Patient contact was attempted within 2 business days of discharge. Top Challenges reviewed with the provider Unable to reach patient for care management Patient left hospital AMA: still needed further evaluation of difficulty swallowing. Also did not complete ABX therapy for UTI. Did not receive any prescriptions. Urine culture pending. CT chest/abd/pelv Mediastinum/jono showed slight patulous appearance of the esophagus with enteric content suggesting dysmotility or reflux. Concern for malignancy given Crohn's and tobacco use. Needs Colonoscopy and EGD. Refused exam for foot ulcer Method of communication with provider :chart routing Inpatient RRAT score: 30 Was this a readmission? no  
Patient stated reason for the readmission: n/a Nurse Navigator made multiple attempts to reach the patient and emergency contacts without success. Noted that the patient was \"no show\" for PCP follow-up appointment. Message sent to Dr. Heather Koroma to make aware. Disease Specific:   N/A Summary of patient's top problems: 1. Hypokalemia - K 1.6 POA. Mg 1.8. Replaced. 3.7 when patient left AMA. 2. Anemia, weight loss, and dysphagia w hx of Crohn's Disease - Patient lost 20 pounds in 3 months, unintentional. No colonoscopy in 10 years, no EGD in 35. Reports intermittent black and pale stools. White count has been uptrending for months. CT chest/abd/pelv Mediastinum/jono showed slight patulous appearance of the esophagus with enteric content suggesting dysmotility or reflux. Concern for malignancy given Crohn's and tobacco use. Not on  immunosuppressant. Pt will need outpatient colonoscopy and EGD. It was recommended barium swallow and UGI with SBFT and Upper GI series. 3. UTI- Received rocephin for two days and did not complete treatment. Urine culture pending. 4. Financial stressors - Pt commented that she cannot afford staying in hospitals. Reports active lawsuit against her due to not paying hospital bills. Patient declined meeting with case management and patient advocate. Home Health orders at discharge: none - patient left AMA [id number]: n/a Date of initial visit: n/a Durable Medical Equipment ordered/company: none Durable Medical Equipment received: n/a Barriers to care? financial, lack of knowledge about disease, utilization of services Advance Care Planning:  
Does patient have an Advance Directive:  not on file Medication(s):  
 
Unable to perform medication reconciliation Current Outpatient Medications Medication Sig  potassium chloride (KLOR-CON) 20 mEq Take 1 Packet by mouth two (2) times daily (with meals).  famotidine (PEPCID) 20 mg tablet Take 1 Tab by mouth two (2) times a day.  cephALEXin (KEFLEX) 500 mg capsule Take 1 Cap by mouth two (2) times a day for 10 days.  oxyCODONE-acetaminophen (PERCOCET) 5-325 mg per tablet Take 1 Tab by mouth every four (4) hours as needed for Pain. Max Daily Amount: 6 Tabs. MUST LAST 30 DAYS  promethazine (PHENERGAN) 25 mg tablet Take 1 Tab by mouth every six (6) hours as needed for Nausea.  oxybutynin (DITROPAN) 5 mg tablet TAKE ONE TABLET BY MOUTH 4 TIMES DAILY  rOPINIRole (REQUIP) 0.5 mg tablet Take 1 Tab by mouth nightly.  amitriptyline (ELAVIL) 50 mg tablet TAKE 1 TABLET BY MOUTH ONCE DAILY AT NIGHT  ibuprofen (MOTRIN) 800 mg tablet Take 1 Tab by mouth every eight (8) hours as needed for Pain.  pantoprazole (PROTONIX) 40 mg tablet Take 1 Tab by mouth daily.  potassium chloride (KLOR-CON) 10 mEq tablet Take 1 Tab by mouth daily.  pioglitazone (ACTOS) 30 mg tablet Take 1 Tab by mouth daily. yes  insulin aspart protamine/insulin aspart (NOVOLOG MIX 70-30) 100 unit/mL (70-30) injection 10 Units by SubCUTAneous route Before breakfast and dinner.  ondansetron (ZOFRAN ODT) 4 mg disintegrating tablet Take 1 Tab by mouth every eight (8) hours as needed for Nausea.  diphenhydrAMINE (BENADRYL) 25 mg tablet Take 50 mg by mouth nightly as needed for Itching.  lisinopril (PRINIVIL, ZESTRIL) 20 mg tablet TAKE ONE TABLET BY MOUTH ONCE DAILY  pravastatin (PRAVACHOL) 40 mg tablet Take 1 Tab by mouth daily.  metFORMIN (GLUCOPHAGE) 1,000 mg tablet Take 1 Tab by mouth two (2) times daily (with meals).  docusate sodium (COLACE) 100 mg capsule Take 100 mg by mouth two (2) times daily as needed for Constipation.  fish oil-omega-3 fatty acids 340-1,000 mg capsule Take 1 Cap by mouth daily.  cholecalciferol (VITAMIN D3) 1,000 unit tablet Take 1,000 Units by mouth daily.  aspirin delayed-release (ASPIR-81) 81 mg tablet Take 1 Tab by mouth daily. No current facility-administered medications for this visit. There are no discontinued medications. [facility] follow up appointment(s):  
Future Appointments Date Time Provider Jesu Marilou 1/17/2019  1:45 PM Guillermo Lyons MD [facility] follow up appointment(s): [facility]  [facility]